# Patient Record
Sex: MALE | Race: BLACK OR AFRICAN AMERICAN | Employment: OTHER | URBAN - METROPOLITAN AREA
[De-identification: names, ages, dates, MRNs, and addresses within clinical notes are randomized per-mention and may not be internally consistent; named-entity substitution may affect disease eponyms.]

---

## 2017-01-24 ENCOUNTER — OFFICE VISIT (OUTPATIENT)
Dept: CARDIOLOGY CLINIC | Age: 72
End: 2017-01-24

## 2017-01-24 VITALS
DIASTOLIC BLOOD PRESSURE: 74 MMHG | WEIGHT: 190 LBS | HEART RATE: 61 BPM | HEIGHT: 74 IN | BODY MASS INDEX: 24.38 KG/M2 | SYSTOLIC BLOOD PRESSURE: 115 MMHG

## 2017-01-24 DIAGNOSIS — E78.5 DYSLIPIDEMIA: ICD-10-CM

## 2017-01-24 DIAGNOSIS — I10 BENIGN ESSENTIAL HTN: ICD-10-CM

## 2017-01-24 DIAGNOSIS — R94.39 ABNORMAL NUCLEAR STRESS TEST: ICD-10-CM

## 2017-01-24 DIAGNOSIS — I50.32 CHRONIC DIASTOLIC HEART FAILURE (HCC): Primary | ICD-10-CM

## 2017-01-24 DIAGNOSIS — I25.118 CORONARY ARTERY DISEASE OF NATIVE ARTERY OF NATIVE HEART WITH STABLE ANGINA PECTORIS (HCC): ICD-10-CM

## 2017-01-24 DIAGNOSIS — I73.9 PAD (PERIPHERAL ARTERY DISEASE) (HCC): ICD-10-CM

## 2017-01-24 DIAGNOSIS — I73.9 CLAUDICATION (HCC): ICD-10-CM

## 2017-01-24 RX ORDER — SIMVASTATIN 20 MG/1
10 TABLET, FILM COATED ORAL
Qty: 30 TAB | Refills: 4 | Status: SHIPPED | OUTPATIENT
Start: 2017-01-24 | End: 2017-06-30 | Stop reason: SDUPTHER

## 2017-01-24 RX ORDER — OMEPRAZOLE 20 MG/1
20 CAPSULE, DELAYED RELEASE ORAL DAILY
Qty: 30 CAP | Refills: 4 | Status: SHIPPED | OUTPATIENT
Start: 2017-01-24 | End: 2019-06-04

## 2017-01-24 NOTE — PROGRESS NOTES
1. Have you been to the ER, urgent care clinic since your last visit? Hospitalized since your last visit? No    2. Have you seen or consulted any other health care providers outside of the [address] [place] [address] since your last visit? Include any pap smears or colon screening. No     3. Since your last visit, have you had any of the following symptoms? Dizziness      4. Have you had any blood work, X-rays or cardiac testing? None    5. Where do you normally have your labs drawn? [facility] [place]    6. Do you need any refills today?   No    Medications confirmed by patient's medication bottles

## 2017-01-24 NOTE — MR AVS SNAPSHOT
Visit Information Date & Time Provider Department Dept. Phone Encounter #  
 1/24/2017  2:15 PM Sherrine Castleman, MD [facility] [address] [place] [address] [id number] Follow-up Instructions Return in about 2 months (around 3/24/2017). Follow-up and Disposition History Your Appointments 1/24/2017  2:15 PM  
ESTABLISHED PATIENT with Sherrine Castleman, MD  
[facility]) Appt Note: 5 m/  
 [address], [address] [facility] [address]  
[address], [address] [address]  
  
    
 3/21/2017 12:45 PM  
ESTABLISHED PATIENT with Sherrine Castleman, MD  
[facility]) Appt Note: 2 months [address], [address] [facility] [address]  
[phone] Upcoming Health Maintenance Date Due DTaP/Tdap/Td series (1 - Tdap) 5/25/1965 FOBT Q 1 YEAR AGE 50-75 5/25/1994 ZOSTER VACCINE AGE 60> 5/25/2004 GLAUCOMA SCREENING Q2Y 5/25/2009 Pneumococcal 65+ Low/Medium Risk (1 of 2 - PCV13) 5/25/2009 MEDICARE YEARLY EXAM 5/25/2009 INFLUENZA AGE 9 TO ADULT 8/1/2016 Allergies as of 1/24/2017  Review Complete On: 1/24/2017 By: Sherrine Castleman, MD  
 No Known Allergies Current Immunizations  Never Reviewed No immunizations on file. Not reviewed this visit You Were Diagnosed With   
  
 Codes Comments Chronic diastolic heart failure (HCC)    -  Primary ICD-10-CM: I50.32 
ICD-9-CM: 428.32 Benign essential HTN     ICD-10-CM: I10 
ICD-9-CM: 401.1 Coronary artery disease of native artery of native heart with stable angina pectoris ([facility] [address]     ICD-10-CM: I25.118 
ICD-9-CM: 414.01, 413.9 Abnormal nuclear stress test     ICD-10-CM: R94.39 
ICD-9-CM: 794.39 Claudication [facility])     ICD-10-CM: I73.9 ICD-9-CM: 443.9 PAD (peripheral artery disease) (HCC)     ICD-10-CM: I73.9 ICD-9-CM: 443.9 Dyslipidemia     ICD-10-CM: E78.5 ICD-9-CM: 272.4 Vitals BP Pulse Height(growth percentile) Weight(growth percentile) BMI Smoking Status 115/74 61 6' 2\" (1.88 m) 190 lb (86.2 kg) 24.39 kg/m2 Never Smoker Vitals History BMI and BSA Data Body Mass Index Body Surface Area  
 24.39 kg/m 2 2.12 m 2 Preferred Pharmacy Pharmacy Name Phone [facility], [address] Your Updated Medication List  
  
   
This list is accurate as of: 1/24/17  1:49 PM.  Always use your most recent med list.  
  
  
  
  
 ARGININE (L-ARGININE) PO Take  by mouth. aspirin 81 mg chewable tablet Take 325 mg by mouth daily. carvedilol 3.125 mg tablet Commonly known as:  Dareen Kaska Take 1 Tab by mouth two (2) times daily (with meals). cilostazol 50 mg tablet Commonly known as:  PLETAL Take 1 Tab by mouth Before breakfast and dinner. citalopram 20 mg tablet Commonly known as:  Dianelys Ivory Take 20 mg by mouth daily. Comp. Stocking,Thigh,Reg,Medium Misc  
1 Package by Does Not Apply route daily. furosemide 40 mg tablet Commonly known as:  LASIX Take 1 Tab by mouth two (2) times a day. lisinopril 2.5 mg tablet Commonly known as:  Betina Primmer Take 1 Tab by mouth daily. meloxicam 15 mg tablet Commonly known as:  MOBIC Take 15 mg by mouth daily. omeprazole 20 mg capsule Commonly known as:  PRILOSEC Take 1 Cap by mouth daily. oxyCODONE-acetaminophen 5-325 mg per tablet Commonly known as:  PERCOCET Take 1 Tab by mouth every four (4) hours as needed for Pain. Max Daily Amount: 6 Tabs. simvastatin 20 mg tablet Commonly known as:  ZOCOR Take 0.5 Tabs by mouth nightly. Prescriptions Sent to Pharmacy Refills  
 simvastatin (ZOCOR) 20 mg tablet 4 Sig: Take 0.5 Tabs by mouth nightly.   
 Class: Normal  
 Pharmacy: [facility], [address] [place] [address] Ph #: [phone] Route: Oral  
 omeprazole (PRILOSEC) 20 mg capsule 4 Sig: Take 1 Cap by mouth daily. Class: Normal  
 Pharmacy: [facility], [address] [place] [address] Ph #: [phone] Route: Oral  
  
Follow-up Instructions Return in about 2 months (around 3/24/2017). Introducing \[facility] [place] [facility]\"" & [facility] SERVICES! Zoey Mcclelland introduces HeTexted patient portal. Now you can access parts of your medical record, email your doctor's office, and request medication refills online. 1. In your internet browser, go to [email or web address] 2. Click on the First Time User? Click Here link in the Sign In box. You will see the New Member Sign Up page. 3. Enter your HeTexted Access Code exactly as it appears below. You will not need to use this code after youve completed the sign-up process. If you do not sign up before the expiration date, you must request a new code. · HeTexted Access Code: [email or web address] Expires: 4/24/2017  1:22 PM 
 
4. Enter the last four digits of your Social Security Number (xxxx) and Date of Birth (mm/dd/yyyy) as indicated and click Submit. You will be taken to the next sign-up page. 5. Create a HeTexted ID. This will be your HeTexted login ID and cannot be changed, so think of one that is secure and easy to remember. 6. Create a HeTexted password. You can change your password at any time. 7. Enter your Password Reset Question and Answer. This can be used at a later time if you forget your password. 8. Enter your e-mail address. You will receive e-mail notification when new information is available in [address] 9. Click Sign Up. You can now view and download portions of your medical record. 10. Click the Download Summary menu link to download a portable copy of your medical information. If you have questions, please visit the Frequently Asked Questions section of the [facility] website. Remember, Variable is NOT to be used for urgent needs. For medical emergencies, dial 911. Now available from your iPhone and Android! Please provide this summary of care documentation to your next provider. Your primary care clinician is listed as Diana Reasoner II. If you have any questions after today's visit, please call [phone].

## 2017-01-24 NOTE — LETTER
Veleria Lombard 1945 1/24/2017 Dear Bola Cintron MD 
 
I had the pleasure of evaluating  Mr. Corina Gomez in office today. Below are the relevant portions of my assessment and plan of care. ICD-10-CM ICD-9-CM 1. Chronic diastolic heart failure (HCC) I50.32 428.32   
2. Benign essential HTN I10 401.1 3. Coronary artery disease of native artery of native heart with stable angina pectoris ([facility] [address] I25.118 414.01   
  413.9 4. Abnormal nuclear stress test R94.39 794.39   
5. Claudication (HCC) I73.9 443.9 6. PAD (peripheral artery disease) ([facility]) I73.9 443.9 7. Dyslipidemia E78.5 272.4 Current Outpatient Prescriptions Medication Sig Dispense Refill  ARGININE, L-ARGININE, PO Take  by mouth.  simvastatin (ZOCOR) 20 mg tablet Take 0.5 Tabs by mouth nightly. 30 Tab 4  
 omeprazole (PRILOSEC) 20 mg capsule Take 1 Cap by mouth daily. 30 Cap 4  
 meloxicam (MOBIC) 15 mg tablet Take 15 mg by mouth daily.  carvedilol (COREG) 3.125 mg tablet Take 1 Tab by mouth two (2) times daily (with meals). 60 Tab 3  
 aspirin 81 mg chewable tablet Take 325 mg by mouth daily.  cilostazol (PLETAL) 50 mg tablet Take 1 Tab by mouth Before breakfast and dinner. 60 Tab 4  furosemide (LASIX) 40 mg tablet Take 1 Tab by mouth two (2) times a day. 60 Tab 4  
 lisinopril (PRINIVIL, ZESTRIL) 2.5 mg tablet Take 1 Tab by mouth daily. 90 Tab 3  
 citalopram (CELEXA) 20 mg tablet Take 20 mg by mouth daily.  oxyCODONE-acetaminophen (PERCOCET) 5-325 mg per tablet Take 1 Tab by mouth every four (4) hours as needed for Pain. Max Daily Amount: 6 Tabs. 20 Tab 0  
 Comp. Stocking,Thigh,Reg,Medium misc 1 Package by Does Not Apply route daily. 2 Package 0 Orders Placed This Encounter  ARGININE, L-ARGININE, PO Sig: Take  by mouth.  simvastatin (ZOCOR) 20 mg tablet Sig: Take 0.5 Tabs by mouth nightly. Dispense:  30 Tab Refill:  4  omeprazole (PRILOSEC) 20 mg capsule Sig: Take 1 Cap by mouth daily. Dispense:  30 Cap Refill:  4 If you have questions, please do not hesitate to call me. I look forward to following  Loida Osmani along with you.  
 
Sincerely, 
Minus MD Jhonny

## 2017-01-24 NOTE — PROGRESS NOTES
HISTORY OF PRESENT ILLNESS  Trinity Camilo is a 66 y.o. male. CHF   The history is provided by the patient. This is a chronic problem. The current episode started more than 1 week ago. The problem occurs daily. The problem has been gradually improving. Associated symptoms include shortness of breath. Pertinent negatives include no chest pain. Shortness of Breath   The history is provided by the patient. This is a chronic problem. The problem occurs intermittently. The current episode started more than 1 week ago. The problem has been gradually improving. Associated symptoms include claudication. Pertinent negatives include no PND, no orthopnea and no chest pain. Associated medical issues include CAD and heart failure. Chest Pain (Angina)    The history is provided by the patient. This is a chronic problem. The current episode started more than 1 week ago. The problem has not changed since onset. The problem occurs every several days. The pain is associated with exertion and normal activity. The pain is present in the left side. The pain is at a severity of 3/10. The quality of the pain is described as pressure-like. The pain does not radiate. Associated symptoms include claudication, lower extremity edema and shortness of breath. Pertinent negatives include no diaphoresis, no nausea, no orthopnea, no palpitations, no PND and no weakness. He has tried rest for the symptoms. Risk factors include smoking/tobacco exposure, hypertension and male gender. His past medical history is significant for HTN and CHF. Procedural history includes echocardiogram and stress thallium. Claudication   The history is provided by the patient. This is a chronic problem. The current episode started more than 1 week ago. The problem occurs daily. The problem has been gradually improving. Associated symptoms include shortness of breath. Pertinent negatives include no chest pain. The symptoms are aggravated by exertion and walking.  The symptoms are relieved by rest. He has tried rest for the symptoms. Review of Systems   Constitutional: Negative for chills, diaphoresis and weight loss. HENT: Negative for hearing loss. Eyes: Negative for blurred vision. Respiratory: Positive for shortness of breath. Negative for stridor. Cardiovascular: Positive for claudication. Negative for chest pain, palpitations, orthopnea and PND. Gastrointestinal: Negative for heartburn and nausea. Musculoskeletal: Negative for myalgias. Neurological: Negative for tingling, tremors, focal weakness, loss of consciousness and weakness. Psychiatric/Behavioral: Negative for depression and suicidal ideas. Family History   Problem Relation Age of Onset    Heart Attack Neg Hx     Stroke Neg Hx        Past Medical History   Diagnosis Date    Benign essential HTN 2/17/2016    Dyslipidemia 1/24/2017       Past Surgical History   Procedure Laterality Date    Hx cholecystectomy      Hx gi      Hx hernia repair Bilateral        Social History   Substance Use Topics    Smoking status: Never Smoker    Smokeless tobacco: Never Used    Alcohol use No       No Known Allergies    Outpatient Prescriptions Marked as Taking for the 1/24/17 encounter (Office Visit) with An Mcgill MD   Medication Sig Dispense Refill    ARGININE, L-ARGININE, PO Take  by mouth.  simvastatin (ZOCOR) 20 mg tablet Take 0.5 Tabs by mouth nightly. 30 Tab 4    omeprazole (PRILOSEC) 20 mg capsule Take 1 Cap by mouth daily. 30 Cap 4    meloxicam (MOBIC) 15 mg tablet Take 15 mg by mouth daily.  carvedilol (COREG) 3.125 mg tablet Take 1 Tab by mouth two (2) times daily (with meals). 60 Tab 3    aspirin 81 mg chewable tablet Take 325 mg by mouth daily.  cilostazol (PLETAL) 50 mg tablet Take 1 Tab by mouth Before breakfast and dinner. 60 Tab 4    furosemide (LASIX) 40 mg tablet Take 1 Tab by mouth two (2) times a day.  60 Tab 4        Visit Vitals    /74  Pulse 61    Ht 6' 2\" (1.88 m)    Wt 86.2 kg (190 lb)    BMI 24.39 kg/m2     Physical Exam   Constitutional: He is oriented to person, place, and time. He appears well-developed and well-nourished. No distress. HENT:   Head: Atraumatic. Mouth/Throat: No oropharyngeal exudate. Eyes: Conjunctivae are normal. No scleral icterus. Neck: Neck supple. No JVD present. Cardiovascular: Normal rate and regular rhythm. Exam reveals no gallop. No murmur heard. Pulmonary/Chest: Effort normal and breath sounds normal. No stridor. He has no wheezes. He has no rales. Abdominal: Soft. There is no tenderness. There is no rebound. Musculoskeletal: Normal range of motion. He exhibits edema (improving since last visit). Neurological: He is alert and oriented to person, place, and time. He exhibits normal muscle tone. Skin: Skin is warm. He is not diaphoretic. Psychiatric: He has a normal mood and affect. His behavior is normal.     6/16 Cardiac Cath  FINDINGS:  1. Left main is patent and bifurcates into left anterior descending artery  and circumflex artery. 2. Left anterior descending artery has mild ectasia with moderate to severe  stenosis in the proximal portion. It appears to be a napkin ring type  lesion. By IVUS imaging, we obtained a stenosis of 62% with an area of 3.7  mm2. Mid to distal left anterior descending artery had wall irregularities  with sluggish flow, suggestive of severe microvascular disease. Apical LAD  had wall irregularities. 3. Diagonal 1 and diagonal 2 artery appeared to be small caliber vessel  with wall irregularities. 4. Circumflex artery is codominant with sluggish flow consistent with  microvascular disease. 5. Right coronary artery is codominant with sluggish flow suggestive of  microvascular disease.   6. Left ventricular end-diastolic pressure was 13 mmHg.     CONCLUSION: Single-vessel coronary artery disease.  Left anterior  descending artery stenosis was 62% in the proximal portion by intravascular  ultrasound imaging. Normal left ventricular and diastolic pressure. The  patient is to be on intense medical management and risk factor  modification. ASSESSMENT and PLAN    ICD-10-CM ICD-9-CM    1. Chronic diastolic heart failure (HCC) I50.32 428.32    2. Benign essential HTN I10 401.1    3. Coronary artery disease of native artery of native heart with stable [facility]ina pectoris [facility] I25.118 414.01      413.9    4. Abnormal nuclear stress test R94.39 794.39    5. Claudication ([facility]) I73.9 443.9    6. PAD (peripheral artery disease) ([facility]) I73.9 443.9    7. Dyslipidemia E78.5 272.4      Orders Placed This Encounter    simvastatin (ZOCOR) 20 mg tablet     Sig: Take 0.5 Tabs by mouth nightly. Dispense:  30 Tab     Refill:  4    omeprazole (PRILOSEC) 20 mg capsule     Sig: Take 1 Cap by mouth daily. Dispense:  30 Cap     Refill:  4     Follow-up Disposition:  Return in about 2 months (around 3/24/2017). Patient with LV dysfunction/ abnormal stress test-- had cardiac cath-62% LAD stenosis by IVUS imaging. Will add statin and PPI for GERD. Will reevaluate his symptoms in 2 months.

## 2017-02-23 ENCOUNTER — APPOINTMENT (OUTPATIENT)
Dept: GENERAL RADIOLOGY | Age: 72
End: 2017-02-23
Attending: EMERGENCY MEDICINE
Payer: MEDICARE

## 2017-02-23 ENCOUNTER — HOSPITAL ENCOUNTER (EMERGENCY)
Age: 72
Discharge: HOME | End: 2017-02-23
Attending: EMERGENCY MEDICINE
Payer: MEDICARE

## 2017-02-23 VITALS
WEIGHT: 192 LBS | OXYGEN SATURATION: 95 % | RESPIRATION RATE: 14 BRPM | HEART RATE: 66 BPM | BODY MASS INDEX: 24.64 KG/M2 | SYSTOLIC BLOOD PRESSURE: 108 MMHG | DIASTOLIC BLOOD PRESSURE: 81 MMHG | HEIGHT: 74 IN | TEMPERATURE: 98.2 F

## 2017-02-23 DIAGNOSIS — I20.9 ANGINA PECTORIS (HCC): Primary | ICD-10-CM

## 2017-02-23 LAB
ALBUMIN SERPL BCP-MCNC: 3.7 G/DL (ref 3.4–5)
ALBUMIN/GLOB SERPL: 1 {RATIO} (ref 0.8–1.7)
ALP SERPL-CCNC: 72 U/L (ref 45–117)
ALT SERPL-CCNC: 23 U/L (ref 16–61)
ANION GAP BLD CALC-SCNC: 4 MMOL/L (ref 3–18)
AST SERPL W P-5'-P-CCNC: 18 U/L (ref 15–37)
BASOPHILS # BLD AUTO: 0 K/UL (ref 0–0.06)
BASOPHILS # BLD: 0 % (ref 0–2)
BILIRUB SERPL-MCNC: 0.7 MG/DL (ref 0.2–1)
BNP SERPL-MCNC: 188 PG/ML (ref 0–900)
BUN SERPL-MCNC: 17 MG/DL (ref 7–18)
BUN/CREAT SERPL: 15 (ref 12–20)
CALCIUM SERPL-MCNC: 9.3 MG/DL (ref 8.5–10.1)
CHLORIDE SERPL-SCNC: 105 MMOL/L (ref 100–108)
CK MB CFR SERPL CALC: 1.8 % (ref 0–4)
CK MB CFR SERPL CALC: 1.9 % (ref 0–4)
CK MB SERPL-MCNC: 3.3 NG/ML (ref 5–25)
CK MB SERPL-MCNC: 3.7 NG/ML (ref 5–25)
CK SERPL-CCNC: 181 U/L (ref 39–308)
CK SERPL-CCNC: 190 U/L (ref 39–308)
CO2 SERPL-SCNC: 30 MMOL/L (ref 21–32)
CREAT SERPL-MCNC: 1.17 MG/DL (ref 0.6–1.3)
DIFFERENTIAL METHOD BLD: NORMAL
EOSINOPHIL # BLD: 0.2 K/UL (ref 0–0.4)
EOSINOPHIL NFR BLD: 5 % (ref 0–5)
ERYTHROCYTE [DISTWIDTH] IN BLOOD BY AUTOMATED COUNT: 13.7 % (ref 11.6–14.5)
GLOBULIN SER CALC-MCNC: 3.8 G/DL (ref 2–4)
GLUCOSE SERPL-MCNC: 98 MG/DL (ref 74–99)
HCT VFR BLD AUTO: 44.8 % (ref 36–48)
HGB BLD-MCNC: 15.3 G/DL (ref 13–16)
LYMPHOCYTES # BLD AUTO: 47 % (ref 21–52)
LYMPHOCYTES # BLD: 2.2 K/UL (ref 0.9–3.6)
MCH RBC QN AUTO: 29.5 PG (ref 24–34)
MCHC RBC AUTO-ENTMCNC: 34.2 G/DL (ref 31–37)
MCV RBC AUTO: 86.5 FL (ref 74–97)
MONOCYTES # BLD: 0.3 K/UL (ref 0.05–1.2)
MONOCYTES NFR BLD AUTO: 7 % (ref 3–10)
NEUTS SEG # BLD: 1.9 K/UL (ref 1.8–8)
NEUTS SEG NFR BLD AUTO: 41 % (ref 40–73)
PLATELET # BLD AUTO: 241 K/UL (ref 135–420)
PMV BLD AUTO: 9.4 FL (ref 9.2–11.8)
POTASSIUM SERPL-SCNC: 4.3 MMOL/L (ref 3.5–5.5)
PROT SERPL-MCNC: 7.5 G/DL (ref 6.4–8.2)
RBC # BLD AUTO: 5.18 M/UL (ref 4.7–5.5)
SODIUM SERPL-SCNC: 139 MMOL/L (ref 136–145)
TROPONIN I SERPL-MCNC: <0.02 NG/ML (ref 0–0.04)
TROPONIN I SERPL-MCNC: <0.02 NG/ML (ref 0–0.04)
WBC # BLD AUTO: 4.7 K/UL (ref 4.6–13.2)

## 2017-02-23 PROCEDURE — 71020 XR CHEST PA LAT: CPT

## 2017-02-23 PROCEDURE — 94762 N-INVAS EAR/PLS OXIMTRY CONT: CPT

## 2017-02-23 PROCEDURE — 93005 ELECTROCARDIOGRAM TRACING: CPT

## 2017-02-23 PROCEDURE — 99285 EMERGENCY DEPT VISIT HI MDM: CPT

## 2017-02-23 PROCEDURE — 80053 COMPREHEN METABOLIC PANEL: CPT | Performed by: EMERGENCY MEDICINE

## 2017-02-23 PROCEDURE — 85025 COMPLETE CBC W/AUTO DIFF WBC: CPT | Performed by: EMERGENCY MEDICINE

## 2017-02-23 PROCEDURE — 82550 ASSAY OF CK (CPK): CPT | Performed by: EMERGENCY MEDICINE

## 2017-02-23 PROCEDURE — 83880 ASSAY OF NATRIURETIC PEPTIDE: CPT | Performed by: EMERGENCY MEDICINE

## 2017-02-23 RX ORDER — SODIUM CHLORIDE 0.9 % (FLUSH) 0.9 %
5-10 SYRINGE (ML) INJECTION EVERY 8 HOURS
Status: DISCONTINUED | OUTPATIENT
Start: 2017-02-23 | End: 2017-02-23 | Stop reason: HOSPADM

## 2017-02-23 RX ORDER — SODIUM CHLORIDE 0.9 % (FLUSH) 0.9 %
5-10 SYRINGE (ML) INJECTION AS NEEDED
Status: DISCONTINUED | OUTPATIENT
Start: 2017-02-23 | End: 2017-02-23 | Stop reason: HOSPADM

## 2017-02-23 NOTE — ED NOTES
Patient resting comfortably, call bell within reach, bed at lowest level, no other needs at this time.

## 2017-02-23 NOTE — DISCHARGE INSTRUCTIONS
Angina: Care Instructions  Your Care Instructions    You have a problem called angina. Angina happens when there is not enough blood flow to your heart muscle. Angina is a sign of coronary artery disease (CAD). CAD occurs when blood vessels that supply the heart become narrowed. Having CAD increases your risk of a heart attack. Chest pain or pressure is the most common symptom of angina. But some people have other symptoms, like:  · Pain, pressure, or a strange feeling in the back, neck, jaw, or upper belly, or in one or both shoulders or arms. · Shortness of breath. · Nausea or vomiting. · Lightheadedness or sudden weakness. · Fast or irregular heartbeat. Women are somewhat more likely than men to have angina symptoms like shortness of breath, nausea, and back or jaw pain. Angina can be dangerous. That's why it is important to pay attention to your symptoms. Know what is typical for you, learn how to control your symptoms, and understand when you need to get treatment. A change in your usual pattern of symptoms is an emergency. It may mean that you are having a heart attack. The doctor has checked you carefully, but problems can develop later. If you notice any problems or new symptoms, get medical treatment right away. Follow-up care is a key part of your treatment and safety. Be sure to make and go to all appointments, and call your doctor if you are having problems. It's also a good idea to know your test results and keep a list of the medicines you take. How can you care for yourself at home? Medicines  · If your doctor has given you nitroglycerin for angina symptoms, keep it with you at all times. If you have symptoms, sit down and rest, and take the first dose of nitroglycerin as directed. If your symptoms get worse or are not getting better within 5 minutes, call 911 right away. Stay on the phone. The emergency  will give you further instructions.   · If your doctor advises it, take 1 low-dose aspirin a day to prevent heart attack. · Be safe with medicines. Take your medicines exactly as prescribed. Call your doctor if you think you are having a problem with your medicine. You will get more details on the specific medicines your doctor prescribes. Lifestyle changes  · Do not smoke. If you need help quitting, talk to your doctor about stop-smoking programs and medicines. These can increase your chances of quitting for good. · Eat a heart-healthy diet that is low in saturated fat and salt, and is high in fiber. Talk to your doctor or a dietitian about healthy eating. · Stay at a healthy weight. Or lose weight if you need to. Activity  · Talk to your doctor about a level of activity that is safe for you. · If an activity causes angina symptoms, stop and rest.  When should you call for help? Call 911 anytime you think you may need emergency care. For example, call if:  · You passed out (lost consciousness). · You have symptoms of a heart attack. These may include:  ¨ Chest pain or pressure, or a strange feeling in the chest.  ¨ Sweating. ¨ Shortness of breath. ¨ Nausea or vomiting. ¨ Pain, pressure, or a strange feeling in the back, neck, jaw, or upper belly or in one or both shoulders or arms. ¨ Lightheadedness or sudden weakness. ¨ A fast or irregular heartbeat. After you call 911, the  may tell you to chew 1 adult-strength or 2 to 4 low-dose aspirin. Wait for an ambulance. Do not try to drive yourself. · You have angina symptoms that do not go away with rest or are not getting better within 5 minutes after you take a dose of nitroglycerin. Call your doctor now or seek immediate medical care if:  · You are having angina symptoms more often than usual, or they are different or worse than usual.  · You feel dizzy or lightheaded, or you feel like you may faint. Watch closely for changes in your health, and be sure to contact your doctor if you have any problems.   Where can you learn more? Go to http://hugo-sherley.info/. Enter H129 in the search box to learn more about \"Angina: Care Instructions. \"  Current as of: January 27, 2016  Content Version: 11.1  © 7962-0444 [employer]. Care instructions adapted under license by [employer] (which disclaims liability or warranty for this information). If you have questions about a medical condition or this instruction, always ask your healthcare professional. [address] any warranty or liability for your use of this information.

## 2017-02-23 NOTE — ED NOTES
Pt hourly rounding competed. Safety   Pt (*) resting on stretcher with side rails up and call bell in reach. () in chair    () in parents arms. Toileting   Pt offered ()Bedpan     ()Assistance to Restroom     ()Urinal  Ongoing Updates  Updated on plan of care and status of test results.   Pain Management  Inquired as to comfort and offered comfort measures:    () warm blankets   () dimmed lights

## 2017-02-23 NOTE — ED NOTES
I have reviewed discharge instructions with the patient. The patient verbalized understanding. All discharge education and paperwork given, no questions at this time.

## 2017-02-23 NOTE — ED PROVIDER NOTES
HPI Comments: 8:10 AM Jena Osman is a 66 y.o.  [place] male with h/o HTN who presents to ED complaining of chest pain onset a couple of months ago. He states that his chest pain worsened yesterday afternoon at 1500, and has been intermittent with the last episode occurring around midnight. Each episode lasted about 10 minutes and during the episodes he also experienced nausea, SOB, and dizziness. The patient lives by himself, is not , and has no children. He also complains of trouble swallowing, vomiting, diarrhea, dysuria, urinary frequency, back pain, fever, chills, and a non-productive cough. He reports that the trouble swallowing is being treated by his PCP. He last had diarrhea and vomiting two days ago. No other concerns or symptoms at this time. He denies tobacco use, alcohol use, and illicit drug use. PCP: Narciso Reyez MD      The history is provided by the patient. Past Medical History:   Diagnosis Date    Benign essential HTN 2/17/2016    Dyslipidemia 1/24/2017       Past Surgical History:   Procedure Laterality Date    HX CHOLECYSTECTOMY      HX GI      HX HERNIA REPAIR Bilateral          Family History:   Problem Relation Age of Onset    Heart Attack Neg Hx     Stroke Neg Hx        Social History     Social History    Marital status: SINGLE     Spouse name: N/A    Number of children: N/A    Years of education: N/A     Occupational History    Not on file. Social History Main Topics    Smoking status: Never Smoker    Smokeless tobacco: Never Used    Alcohol use No    Drug use: No    Sexual activity: Not on file     Other Topics Concern    Not on file     Social History Narrative         ALLERGIES: Review of patient's allergies indicates no known allergies. Review of Systems   Constitutional: Positive for chills and fever. Negative for appetite change, diaphoresis, fatigue and unexpected weight change. HENT: Positive for trouble swallowing. Negative for congestion, dental problem, ear discharge, ear pain, hearing loss, nosebleeds, rhinorrhea, sinus pressure, sore throat, tinnitus and voice change. Eyes: Negative for photophobia, pain, discharge, redness and visual disturbance. Respiratory: Positive for cough and shortness of breath. Negative for choking, chest tightness, wheezing and stridor. Cardiovascular: Positive for chest pain. Negative for palpitations and leg swelling. Gastrointestinal: Positive for diarrhea, nausea and vomiting. Negative for abdominal distention, abdominal pain, anal bleeding, blood in stool and constipation. Genitourinary: Positive for dysuria and frequency. Negative for decreased urine volume, difficulty urinating, discharge, flank pain, genital sores, hematuria, penile pain, penile swelling, scrotal swelling, testicular pain and urgency. Musculoskeletal: Positive for back pain. Negative for neck pain and neck stiffness. Skin: Negative for pallor, rash and wound. Neurological: Positive for dizziness. Negative for tremors, seizures, syncope, speech difficulty, weakness, light-headedness and headaches. Hematological: Negative for adenopathy. Does not bruise/bleed easily. Psychiatric/Behavioral: Negative for agitation, behavioral problems, confusion, hallucinations, self-injury, sleep disturbance and suicidal ideas. The patient is not nervous/anxious and is not hyperactive. Vitals:    02/23/17 1100 02/23/17 1130 02/23/17 1200 02/23/17 1230   BP: 130/78 112/68 111/67 108/81   Pulse: 62 79 66    Resp:       Temp:       SpO2: 94% 95%  95%   Weight:       Height:                Physical Exam   Constitutional: He is oriented to person, place, and time. He appears well-developed and well-nourished. No distress. HENT:   Head: Normocephalic and atraumatic.    Right Ear: External ear normal.   Left Ear: External ear normal.   Nose: Nose normal.   Mouth/Throat: Oropharynx is clear and moist. No oropharyngeal exudate. Eyes: Conjunctivae and EOM are normal. Pupils are equal, round, and reactive to light. Right eye exhibits no discharge. Left eye exhibits no discharge. No scleral icterus. Neck: Normal range of motion. Neck supple. No JVD present. No tracheal deviation present. No thyromegaly present. Cardiovascular: Normal rate, regular rhythm, normal heart sounds and intact distal pulses. Exam reveals no gallop and no friction rub. No murmur heard. Pulmonary/Chest: Effort normal and breath sounds normal. No stridor. No respiratory distress. He has no wheezes. He has no rales. He exhibits no tenderness. Abdominal: Soft. Bowel sounds are normal. He exhibits no distension and no mass. There is no tenderness. There is no rebound and no guarding. Musculoskeletal: Normal range of motion. He exhibits no edema, tenderness or deformity. Lymphadenopathy:     He has no cervical adenopathy. Neurological: He is alert and oriented to person, place, and time. No cranial nerve deficit. He exhibits normal muscle tone. Coordination normal.   Skin: Skin is warm and dry. No rash noted. He is not diaphoretic. No erythema. No pallor. Psychiatric: He has a normal mood and affect. His behavior is normal. Judgment and thought content normal.   Nursing note and vitals reviewed. MDM  Number of Diagnoses or Management Options  Diagnosis management comments: Differential includes:  Angina, Chest wall pain, GI causes including GERD, Anxiety, (Pneumonia, PE and ACS are unlikely). Patient had a Cardiac catheterization on 7/8/16 which revealed single vessel coronary disease. Left anterior descending artery stenosis was 62% in the proximal portion by intravascular ultrasound imaging. Normal left ventricular and diastolic pressure. Intense medical management and risk factor modification.        Amount and/or Complexity of Data Reviewed  Clinical lab tests: ordered and reviewed  Tests in the radiology section of CPT®: ordered and reviewed  Tests in the medicine section of CPT®: ordered and reviewed  Decide to obtain previous medical records or to obtain history from someone other than the patient: yes  Obtain history from someone other than the patient: yes  Review and summarize past medical records: yes  Independent visualization of images, tracings, or specimens: yes    Risk of Complications, Morbidity, and/or Mortality  Presenting problems: high  Diagnostic procedures: high  Management options: high    Patient Progress  Patient progress: stable    ED Course       Procedures      Vitals:  Patient Vitals for the past 12 hrs:   Temp Pulse Resp BP SpO2   02/23/17 1230 - - - 108/81 95 %   02/23/17 1200 - 66 - 111/67 -   02/23/17 1130 - 79 - 112/68 95 %   02/23/17 1100 - 62 - 130/78 94 %   02/23/17 1015 - 65 14 125/81 -   02/23/17 1000 - 72 10 135/80 -   02/23/17 0930 - 60 - 108/82 100 %   02/23/17 0830 - 61 15 123/74 100 %   02/23/17 0800 - - - - 99 %   02/23/17 0800 - 62 16 148/86 100 %   02/23/17 0738 - 67 14 - (!) 78 %   02/23/17 0737 - - 20 143/83 -   02/23/17 0731 98.2 °F (36.8 °C) 87 20 - 96 %   Pulse ox reviewed and WNL      Medications ordered:   Medications   sodium chloride (NS) flush 5-10 mL (not administered)   sodium chloride (NS) flush 5-10 mL (not administered)         Lab findings:  Recent Results (from the past 12 hour(s))   EKG, 12 LEAD, INITIAL    Collection Time: 02/23/17  7:35 AM   Result Value Ref Range    Ventricular Rate 64 BPM    Atrial Rate 52 BPM    P-R Interval 124 ms    QRS Duration 76 ms    Q-T Interval 436 ms    QTC Calculation (Bezet) 449 ms    Calculated P Axis 57 degrees    Calculated R Axis 30 degrees    Calculated T Axis -34 degrees    Diagnosis       Sinus bradycardia with frequent premature ventricular complexes  Possible Left atrial enlargement  Nonspecific T wave abnormality  Abnormal ECG  When compared with ECG of 25-MAR-2016 20:44,  No significant change was found     CARDIAC PANEL,(CK, CKMB & TROPONIN)    Collection Time: 02/23/17  7:47 AM   Result Value Ref Range     39 - 308 U/L    CK - MB 3.7 (H) <3.6 ng/ml    CK-MB Index 1.9 0.0 - 4.0 %    Troponin-I, Qt. <0.02 0.0 - 0.045 NG/ML   CBC WITH AUTOMATED DIFF    Collection Time: 02/23/17  7:47 AM   Result Value Ref Range    WBC 4.7 4.6 - 13.2 K/uL    RBC 5.18 4.70 - 5.50 M/uL    HGB 15.3 13.0 - 16.0 g/dL    HCT 44.8 36.0 - 48.0 %    MCV 86.5 74.0 - 97.0 FL    MCH 29.5 24.0 - 34.0 PG    MCHC 34.2 31.0 - 37.0 g/dL    RDW 13.7 11.6 - 14.5 %    PLATELET 922 206 - 710 K/uL    MPV 9.4 9.2 - 11.8 FL    NEUTROPHILS 41 40 - 73 %    LYMPHOCYTES 47 21 - 52 %    MONOCYTES 7 3 - 10 %    EOSINOPHILS 5 0 - 5 %    BASOPHILS 0 0 - 2 %    ABS. NEUTROPHILS 1.9 1.8 - 8.0 K/UL    ABS. LYMPHOCYTES 2.2 0.9 - 3.6 K/UL    ABS. MONOCYTES 0.3 0.05 - 1.2 K/UL    ABS. EOSINOPHILS 0.2 0.0 - 0.4 K/UL    ABS. BASOPHILS 0.0 0.0 - 0.06 K/UL    DF AUTOMATED     METABOLIC PANEL, COMPREHENSIVE    Collection Time: 02/23/17  7:47 AM   Result Value Ref Range    Sodium 139 136 - 145 mmol/L    Potassium 4.3 3.5 - 5.5 mmol/L    Chloride 105 100 - 108 mmol/L    CO2 30 21 - 32 mmol/L    Anion gap 4 3.0 - 18 mmol/L    Glucose 98 74 - 99 mg/dL    BUN 17 7.0 - 18 MG/DL    Creatinine 1.17 0.6 - 1.3 MG/DL    BUN/Creatinine ratio 15 12 - 20      GFR est AA >60 >60 ml/min/1.73m2    GFR est non-AA >60 >60 ml/min/1.73m2    Calcium 9.3 8.5 - 10.1 MG/DL    Bilirubin, total 0.7 0.2 - 1.0 MG/DL    ALT (SGPT) 23 16 - 61 U/L    AST (SGOT) 18 15 - 37 U/L    Alk.  phosphatase 72 45 - 117 U/L    Protein, total 7.5 6.4 - 8.2 g/dL    Albumin 3.7 3.4 - 5.0 g/dL    Globulin 3.8 2.0 - 4.0 g/dL    A-G Ratio 1.0 0.8 - 1.7     PRO-BNP    Collection Time: 02/23/17  7:47 AM   Result Value Ref Range    NT pro- 0 - 900 PG/ML   CARDIAC PANEL,(CK, CKMB & TROPONIN)    Collection Time: 02/23/17 11:08 AM   Result Value Ref Range     39 - 308 U/L    CK - MB 3.3 <3.6 ng/ml    CK-MB Index 1.8 0.0 - 4.0 %    Troponin-I, Qt. <0.02 0.0 - 0.045 NG/ML       EKG interpretation by ED Physician:   ED EKG interpretation:  Rhythm: sinus bradycardia with frequent PVC's. Rate (approx.): 64; Axis: normal; QRS interval: normal ; ST/T wave: non-specific ST/T wave changes unchanged from 3/25/16; Other findings: normal. This EKG was interpreted by [facility], [facility] Provider. X-Ray, CT or other radiology findings or impressions:  XR CHEST PA LAT   Final Result   IMPRESSION:     1. Mild prominence of the cardiac silhouette. 2. No acute findings otherwise. Interpreted by radiologist.       Orders:  Orders Placed This Encounter    XR CHEST PA LAT     Standing Status:   Standing     Number of Occurrences:   1     Order Specific Question:   Transport     Answer:   Wheelchair [7]     Order Specific Question:   Reason for Exam     Answer:   cp    CARDIAC PANEL,(CK, CKMB & TROPONIN)     Standing Status:   Standing     Number of Occurrences:   1    CBC WITH AUTOMATED DIFF     Standing Status:   Standing     Number of Occurrences:   1    METABOLIC PANEL, COMPREHENSIVE     Standing Status:   Standing     Number of Occurrences:   1    PRO-BNP     Standing Status:   Standing     Number of Occurrences:   1    CARDIAC PANEL,(CK, CKMB & TROPONIN)     Standing Status:   Standing     Number of Occurrences:   1    CARDIAC MONITORING     Standing Status:   Standing     Number of Occurrences:   1     Order Specific Question:   Type: Answer:   Bedside     Order Specific Question:   Off unit:      Answer:   w/out Tele or Nurse    PULSE OXIMETRY CONTINUOUS     Standing Status:   Standing     Number of Occurrences:   1    EKG, 12 LEAD, INITIAL     Standing Status:   Standing     Number of Occurrences:   1     Order Specific Question:   Reason for Exam:     Answer:   cp    SALINE LOCK IV ONE TIME Routine     Standing Status:   Standing     Number of Occurrences:   1    BLOOD PRESSURE MONITOR     Standing Status:   Standing     Number of Occurrences:   1    sodium chloride (NS) flush 5-10 mL    sodium chloride (NS) flush 5-10 mL       Reevaluation, Progress notes, Consult notes, or additional Procedure notes:   1:04 PM I have reassessed the patient and discussed their results and diagnosis. Pt will be discharged in stable condition. Patient is to return to emergency department if any new or worsening condition. Patient understands and verbalizes agreement with plan. Disposition:  Diagnosis:   1. Angina pectoris ([address]        Disposition: Discharged    Follow-up Information     Follow up With Details Comments [facility] MD LUZ MARIA Schedule an appointment as soon as possible for a visit in 1 day Return to the ED, If symptoms worsen [address] [place] [address]  [phone]             Patient's Medications   Start Taking    No medications on file   Continue Taking    ARGININE, L-ARGININE, PO    Take  by mouth. ASPIRIN 81 MG CHEWABLE TABLET    Take 325 mg by mouth daily. CARVEDILOL (COREG) 3.125 MG TABLET    Take 1 Tab by mouth two (2) times daily (with meals). CILOSTAZOL (PLETAL) 50 MG TABLET    Take 1 Tab by mouth Before breakfast and dinner. CITALOPRAM (CELEXA) 20 MG TABLET    Take 20 mg by mouth daily. COMP. [id number] [id number] Package by Does Not Apply route daily. FUROSEMIDE (LASIX) 40 MG TABLET    Take 1 Tab by mouth two (2) times a day. LISINOPRIL (PRINIVIL, ZESTRIL) 2.5 MG TABLET    Take 1 Tab by mouth daily. MELOXICAM (MOBIC) 15 MG TABLET    Take 15 mg by mouth daily. OMEPRAZOLE (PRILOSEC) 20 MG CAPSULE    Take 1 Cap by mouth daily. OXYCODONE-ACETAMINOPHEN (PERCOCET) 5-325 MG PER TABLET    Take 1 Tab by mouth every four (4) hours as needed for Pain. Max Daily Amount: 6 Tabs. SIMVASTATIN (ZOCOR) 20 MG TABLET    Take 0.5 Tabs by mouth nightly.    These Medications have changed    No medications on file   Stop Taking    No medications on file     Scribe [id number]  scribing for and in the presence of Karma uDnne DO (02/23/17)  Signed by: Farrukh Mcrae, February 23, 2017 at 1:01 PM     Physician Attestation  I personally performed the services described in this documentation, reviewed and edited the documentation which was dictated to the scribe in my presence, and it accurately records my words and actions.     Karma Dunne DO (02/23/17)

## 2017-02-24 LAB
ATRIAL RATE: 52 BPM
CALCULATED P AXIS, ECG09: 57 DEGREES
CALCULATED R AXIS, ECG10: 30 DEGREES
CALCULATED T AXIS, ECG11: -34 DEGREES
DIAGNOSIS, 93000: NORMAL
P-R INTERVAL, ECG05: 124 MS
Q-T INTERVAL, ECG07: 436 MS
QRS DURATION, ECG06: 76 MS
QTC CALCULATION (BEZET), ECG08: 449 MS
VENTRICULAR RATE, ECG03: 64 BPM

## 2017-03-21 ENCOUNTER — OFFICE VISIT (OUTPATIENT)
Dept: CARDIOLOGY CLINIC | Age: 72
End: 2017-03-21

## 2017-03-21 VITALS
SYSTOLIC BLOOD PRESSURE: 121 MMHG | BODY MASS INDEX: 24 KG/M2 | DIASTOLIC BLOOD PRESSURE: 78 MMHG | HEART RATE: 68 BPM | HEIGHT: 74 IN | WEIGHT: 187 LBS

## 2017-03-21 DIAGNOSIS — I10 BENIGN ESSENTIAL HTN: ICD-10-CM

## 2017-03-21 DIAGNOSIS — I51.9 LV DYSFUNCTION: ICD-10-CM

## 2017-03-21 DIAGNOSIS — E78.5 DYSLIPIDEMIA: ICD-10-CM

## 2017-03-21 DIAGNOSIS — I73.9 PAD (PERIPHERAL ARTERY DISEASE) (HCC): ICD-10-CM

## 2017-03-21 DIAGNOSIS — I50.32 CHRONIC DIASTOLIC HEART FAILURE (HCC): ICD-10-CM

## 2017-03-21 DIAGNOSIS — I25.118 CORONARY ARTERY DISEASE OF NATIVE ARTERY OF NATIVE HEART WITH STABLE ANGINA PECTORIS (HCC): Primary | ICD-10-CM

## 2017-03-21 RX ORDER — NITROGLYCERIN 0.4 MG/1
TABLET SUBLINGUAL
COMMUNITY
End: 2019-06-04

## 2017-03-21 RX ORDER — GABAPENTIN 100 MG/1
100 CAPSULE ORAL
COMMUNITY
End: 2019-06-04

## 2017-03-21 NOTE — PROGRESS NOTES
HISTORY OF PRESENT ILLNESS  Rebecca Loving is a 66 y.o. male. CHF   The history is provided by the patient. This is a chronic problem. The current episode started more than 1 week ago. The problem occurs daily. The problem has been gradually improving. Associated symptoms include shortness of breath. Pertinent negatives include no chest pain. Leg Swelling   The history is provided by the patient. This is a chronic problem. The problem occurs every several days. The problem has been gradually improving. Associated symptoms include shortness of breath. Pertinent negatives include no chest pain. Shortness of Breath   The history is provided by the patient. This is a chronic problem. The problem occurs intermittently. The current episode started more than 1 week ago. The problem has been gradually improving. Associated symptoms include claudication. Pertinent negatives include no PND, no orthopnea and no chest pain. Associated medical issues include CAD and heart failure. Chest Pain (Angina)    The history is provided by the patient. This is a chronic problem. The current episode started more than 1 week ago. The problem has not changed since onset. The problem occurs every several days. The pain is associated with exertion and normal activity. The pain is present in the left side. The pain is at a severity of 3/10. The quality of the pain is described as pressure-like. The pain does not radiate. Associated symptoms include claudication, lower extremity edema and shortness of breath. Pertinent negatives include no diaphoresis, no nausea, no orthopnea, no palpitations, no PND and no weakness. He has tried rest for the symptoms. Risk factors include smoking/tobacco exposure, hypertension and male gender. His past medical history is significant for HTN and CHF. Procedural history includes echocardiogram and stress thallium. Claudication   The history is provided by the patient. This is a chronic problem.  The current episode started more than 1 week ago. The problem occurs daily. The problem has been gradually improving. Associated symptoms include shortness of breath. Pertinent negatives include no chest pain. The symptoms are aggravated by exertion and walking. The symptoms are relieved by rest. He has tried rest for the symptoms. Review of Systems   Constitutional: Negative for chills, diaphoresis and weight loss. HENT: Negative for hearing loss. Eyes: Negative for blurred vision. Respiratory: Positive for shortness of breath. Negative for stridor. Cardiovascular: Positive for claudication. Negative for chest pain, palpitations, orthopnea and PND. Gastrointestinal: Negative for heartburn and nausea. Musculoskeletal: Negative for myalgias. Neurological: Negative for tingling, tremors, focal weakness, loss of consciousness and weakness. Psychiatric/Behavioral: Negative for depression and suicidal ideas. Family History   Problem Relation Age of Onset    Heart Attack Neg Hx     Stroke Neg Hx        Past Medical History:   Diagnosis Date    Benign essential HTN 2/17/2016    Dyslipidemia 1/24/2017       Past Surgical History:   Procedure Laterality Date    HX CHOLECYSTECTOMY      HX GI      HX HERNIA REPAIR Bilateral        Social History   Substance Use Topics    Smoking status: Never Smoker    Smokeless tobacco: Never Used    Alcohol use No       No Known Allergies    Outpatient Prescriptions Marked as Taking for the 3/21/17 encounter (Office Visit) with Christine Boyd MD   Medication Sig Dispense Refill    gabapentin (NEURONTIN) 100 mg capsule Take 100 mg by mouth nightly.  nitroglycerin (NITROSTAT) 0.4 mg SL tablet by SubLINGual route every five (5) minutes as needed for Chest Pain.  simvastatin (ZOCOR) 20 mg tablet Take 0.5 Tabs by mouth nightly. 30 Tab 4    meloxicam (MOBIC) 15 mg tablet Take 15 mg by mouth daily.       carvedilol (COREG) 3.125 mg tablet Take 1 Tab by mouth two (2) times daily (with meals). 60 Tab 3    cilostazol (PLETAL) 50 mg tablet Take 1 Tab by mouth Before breakfast and dinner. 60 Tab 4    furosemide (LASIX) 40 mg tablet Take 1 Tab by mouth two (2) times a day. (Patient taking differently: Take 40 mg by mouth as needed.) 60 Tab 4    Comp. Stocking,Thigh,Reg,Medium misc 1 Package by Does Not Apply route daily. 2 Package 0        Visit Vitals    /78    Pulse 68    Ht 6' 2\" (1.88 m)    Wt 84.8 kg (187 lb)    BMI 24.01 kg/m2     Physical Exam   Constitutional: He is oriented to person, place, and time. He appears well-developed and well-nourished. No distress. HENT:   Head: Atraumatic. Mouth/Throat: No oropharyngeal exudate. Eyes: Conjunctivae are normal. No scleral icterus. Neck: Neck supple. No JVD present. Cardiovascular: Normal rate and regular rhythm. Exam reveals no gallop. No murmur heard. Pulmonary/Chest: Effort normal and breath sounds normal. No stridor. He has no wheezes. He has no rales. Abdominal: Soft. There is no tenderness. There is no rebound. Musculoskeletal: Normal range of motion. He exhibits edema (improving since last visit). Neurological: He is alert and oriented to person, place, and time. He exhibits normal muscle tone. Skin: Skin is warm. He is not diaphoretic. Psychiatric: He has a normal mood and affect. His behavior is normal.     6/16 Cardiac Cath  FINDINGS:  1. Left main is patent and bifurcates into left anterior descending artery  and circumflex artery. 2. Left anterior descending artery has mild ectasia with moderate to severe  stenosis in the proximal portion. It appears to be a napkin ring type  lesion. By IVUS imaging, we obtained a stenosis of 62% with an area of 3.7  mm2. Mid to distal left anterior descending artery had wall irregularities  with sluggish flow, suggestive of severe microvascular disease. Apical LAD  had wall irregularities.   3. Diagonal 1 and diagonal 2 artery appeared to be small caliber vessel  with wall irregularities. 4. Circumflex artery is codominant with sluggish flow consistent with  microvascular disease. 5. Right coronary artery is codominant with sluggish flow suggestive of  microvascular disease. 6. Left ventricular end-diastolic pressure was 13 mmHg.     CONCLUSION: Single-vessel coronary artery disease.  Left anterior  descending artery stenosis was 62% in the proximal portion by intravascular  ultrasound imaging. Normal left ventricular and diastolic pressure. The  patient is to be on intense medical management and risk factor  modification. ASSESSMENT and PLAN    ICD-10-CM ICD-9-CM    1. Coronary artery disease of native artery of native heart with stable angina pectoris ([facility] [address] I25.118 414.01      413.9    2. Dyslipidemia E78.5 272.4    3. Benign essential HTN I10 401.1    4. Chronic diastolic heart failure (HCC) I50.32 428.32    5. PAD (peripheral artery disease) ([facility]) I73.9 443.9    6. LV dysfunction I51.9 429.9     EF 45%     No orders of the defined types were placed in this encounter. Follow-up Disposition:  Return in about 4 months (around 7/21/2017). Patient with LV dysfunction/ abnormal stress test-- had cardiac cath-62% LAD stenosis by IVUS imaging. Will add statin and PPI for GERD. Has issues with medication complaince- takes coreg once daily and diuretics as needed. Discussed with patient at length regarding the above. Will follow up in 4months.

## 2017-03-21 NOTE — MR AVS SNAPSHOT
Visit Information Date & Time Provider Department Dept. Phone Encounter #  
 3/21/2017 12:45 PM Colton Hightower MD [facility] [address] [place] [address] [id number] Follow-up Instructions Return in about 4 months (around 7/21/2017). Follow-up and Disposition History Your Appointments 9/5/2017 11:30 AM  
ESTABLISHED PATIENT with Colton Hightower MD  
[facility]) Appt Note: 6 months [address] [facility], [address] [place] [address]  
[facility] Upcoming Health Maintenance Date Due DTaP/Tdap/Td series (1 - Tdap) 5/25/1965 FOBT Q 1 YEAR AGE 50-75 5/25/1994 ZOSTER VACCINE AGE 60> 5/25/2004 GLAUCOMA SCREENING Q2Y 5/25/2009 Pneumococcal 65+ Low/Medium Risk (1 of 2 - PCV13) 5/25/2009 MEDICARE YEARLY EXAM 5/25/2009 INFLUENZA AGE 9 TO ADULT 8/1/2016 Allergies as of 3/21/2017  Review Complete On: 3/21/2017 By: Colton Hightower MD  
 No Known Allergies Current Immunizations  Never Reviewed No immunizations on file. Not reviewed this visit You Were Diagnosed With   
  
 Codes Comments Coronary artery disease of native artery of native heart with stable angina pectoris ([facility] [address]    -  Primary ICD-10-CM: I25.118 
ICD-9-CM: 414.01, 413.9 Dyslipidemia     ICD-10-CM: E78.5 ICD-9-CM: 272.4 Benign essential HTN     ICD-10-CM: I10 
ICD-9-CM: 824. 1 Chronic diastolic heart failure (HCC)     ICD-10-CM: I50.32 
ICD-9-CM: 428.32   
 PAD (peripheral artery disease) (HCC)     ICD-10-CM: I73.9 ICD-9-CM: 443.9 LV dysfunction     ICD-10-CM: I51.9 ICD-9-CM: 429.9 EF 45% Vitals BP Pulse Height(growth percentile) Weight(growth percentile) BMI Smoking Status 121/78 68 6' 2\" (1.88 m) 187 lb (84.8 kg) 24.01 kg/m2 Never Smoker Vitals History BMI and BSA Data Body Mass Index Body Surface Area 24.01 kg/m 2 2.1 m 2 Preferred Pharmacy Pharmacy Name Phone [facility], [address] Your Updated Medication List  
  
   
This list is accurate as of: 3/21/17 12:45 PM.  Always use your most recent med list.  
  
  
  
  
 ARGININE (L-ARGININE) PO Take  by mouth. aspirin 81 mg chewable tablet Take 325 mg by mouth daily. carvedilol 3.125 mg tablet Commonly known as:  Phineas Hoar Take 1 Tab by mouth two (2) times daily (with meals). cilostazol 50 mg tablet Commonly known as:  PLETAL Take 1 Tab by mouth Before breakfast and dinner. citalopram 20 mg tablet Commonly known as:  Stark Lindau Take 20 mg by mouth daily. Comp. Stocking,Thigh,Reg,Medium Misc  
1 Package by Does Not Apply route daily. furosemide 40 mg tablet Commonly known as:  LASIX Take 1 Tab by mouth two (2) times a day.  
  
 gabapentin 100 mg capsule Commonly known as:  NEURONTIN Take 100 mg by mouth nightly. lisinopril 2.5 mg tablet Commonly known as:  Sanderson Rylan Take 1 Tab by mouth daily. meloxicam 15 mg tablet Commonly known as:  MOBIC Take 15 mg by mouth daily. nitroglycerin 0.4 mg SL tablet Commonly known as:  NITROSTAT  
by SubLINGual route every five (5) minutes as needed for Chest Pain. omeprazole 20 mg capsule Commonly known as:  PRILOSEC Take 1 Cap by mouth daily. oxyCODONE-acetaminophen 5-325 mg per tablet Commonly known as:  PERCOCET Take 1 Tab by mouth every four (4) hours as needed for Pain. Max Daily Amount: 6 Tabs. simvastatin 20 mg tablet Commonly known as:  ZOCOR Take 0.5 Tabs by mouth nightly. Follow-up Instructions Return in about 4 months (around 7/21/2017). To-Do List   
 04/04/2017 9:00 AM  
  [facility]; [facility] RM 1 at [address] [facility] ([phone]) Please have patient bring a copy of their order if same day add-on. It can also be faxed to [place] [facility] - [phone]. There are no restrictions for this study. The patient can eat breakfast and take their medications. Introducing \[facility]\"" & HEALTH SERVICES! Zoey Hathawayjennie introduces Everpix patient portal. Now you can access parts of your medical record, email your doctor's office, and request medication refills online. 1. In your internet browser, go to [email or web address] 2. Click on the First Time User? Click Here link in the Sign In box. You will see the New Member Sign Up page. 3. Enter your Everpix Access Code exactly as it appears below. You will not need to use this code after youve completed the sign-up process. If you do not sign up before the expiration date, you must request a new code. · Everpix Access Code: [email or web address] Expires: 4/24/2017  2:22 PM 
 
4. Enter the last four digits of your Social Security Number (xxxx) and Date of Birth (mm/dd/yyyy) as indicated and click Submit. You will be taken to the next sign-up page. 5. Create a Everpix ID. This will be your Everpix login ID and cannot be changed, so think of one that is secure and easy to remember. 6. Create a Everpix password. You can change your password at any time. 7. Enter your Password Reset Question and Answer. This can be used at a later time if you forget your password. 8. Enter your e-mail address. You will receive e-mail notification when new information is available in [address] 9. Click Sign Up. You can now view and download portions of your medical record. 10. Click the Download Summary menu link to download a portable copy of your medical information. If you have questions, please visit the Frequently Asked Questions section of the Everpix website. Remember, Everpix is NOT to be used for urgent needs. For medical emergencies, dial 911. Now available from your iPhone and Android! Please provide this summary of care documentation to your next provider. Your primary care clinician is listed as Tami Engel II. If you have any questions after today's visit, please call [phone].

## 2017-03-21 NOTE — LETTER
[place] 1945 
 
3/21/2017 Dear Aries Encarnacion MD 
 
I had the pleasure of evaluating  Mr. Nicole Bennett in office today. Below are the relevant portions of my assessment and plan of care. ICD-10-CM ICD-9-CM 1. Coronary artery disease of native artery of native heart with stable angina pectoris ([facility] [address] I25.118 414.01   
  413.9 2. Dyslipidemia E78.5 272.4 3. Benign essential HTN I10 401.1 4. Chronic diastolic heart failure (HCC) I50.32 428.32   
5. PAD (peripheral artery disease) ([facility]) I73.9 443.9 6. LV dysfunction I51.9 429.9 EF 45% Current Outpatient Prescriptions Medication Sig Dispense Refill  gabapentin (NEURONTIN) 100 mg capsule Take 100 mg by mouth nightly.  nitroglycerin (NITROSTAT) 0.4 mg SL tablet by SubLINGual route every five (5) minutes as needed for Chest Pain.  simvastatin (ZOCOR) 20 mg tablet Take 0.5 Tabs by mouth nightly. 30 Tab 4  
 meloxicam (MOBIC) 15 mg tablet Take 15 mg by mouth daily.  carvedilol (COREG) 3.125 mg tablet Take 1 Tab by mouth two (2) times daily (with meals). 60 Tab 3  
 cilostazol (PLETAL) 50 mg tablet Take 1 Tab by mouth Before breakfast and dinner. 60 Tab 4  furosemide (LASIX) 40 mg tablet Take 1 Tab by mouth two (2) times a day. (Patient taking differently: Take 40 mg by mouth as needed.) 60 Tab 4  Comp. Stocking,Thigh,Reg,Medium misc 1 Package by Does Not Apply route daily. 2 Package 0  
 ARGININE, L-ARGININE, PO Take  by mouth.  omeprazole (PRILOSEC) 20 mg capsule Take 1 Cap by mouth daily. 30 Cap 4  
 lisinopril (PRINIVIL, ZESTRIL) 2.5 mg tablet Take 1 Tab by mouth daily. 90 Tab 3  
 citalopram (CELEXA) 20 mg tablet Take 20 mg by mouth daily.  oxyCODONE-acetaminophen (PERCOCET) 5-325 mg per tablet Take 1 Tab by mouth every four (4) hours as needed for Pain. Max Daily Amount: 6 Tabs. 20 Tab 0  
 aspirin 81 mg chewable tablet Take 325 mg by mouth daily. Orders Placed This Encounter  gabapentin (NEURONTIN) 100 mg capsule Sig: Take 100 mg by mouth nightly.  nitroglycerin (NITROSTAT) 0.4 mg SL tablet Sig: by SubLINGual route every five (5) minutes as needed for Chest Pain. If you have questions, please do not hesitate to call me. I look forward to following Mr. Chas Sweeney along with you.  
 
Sincerely, 
Christine Boyd MD

## 2017-03-21 NOTE — PROGRESS NOTES
1. Have you been to the ER, urgent care clinic since your last visit? Hospitalized since your last visit? 2/23 For chest pain/ [facility] ER     2. Have you seen or consulted any other health care providers outside of the [address] [place] [address] since your last visit? Include any pap smears or colon screening. No     3. Since your last visit, have you had any of the following symptoms? Swelling in legs     4. Have you had any blood work, X-rays or cardiac testing? Yes in cc     5. Where do you normally have your labs drawn? [facility]    6. Do you need any refills today?   NO          Medications confirmed by patient's medication bottles

## 2017-04-04 ENCOUNTER — HOSPITAL ENCOUNTER (OUTPATIENT)
Dept: VASCULAR SURGERY | Age: 72
Discharge: HOME | End: 2017-04-04
Attending: PODIATRIST
Payer: MEDICARE

## 2017-04-04 DIAGNOSIS — I70.223 ATHEROSCLEROSIS OF NATIVE ARTERY OF BOTH LOWER EXTREMITIES WITH REST PAIN (HCC): ICD-10-CM

## 2017-04-04 PROCEDURE — 93923 UPR/LXTR ART STDY 3+ LVLS: CPT

## 2017-04-04 NOTE — PROCEDURES
[facility]  *** FINAL REPORT ***    Name: Martinez Guevara  MRN: [id number]    Outpatient  : 25 May 1945  HIS Order #: [id number]  [address] [facility] Visit #: [id number]  Date: 2017    TYPE OF TEST: Peripheral Arterial Testing    REASON FOR TEST  Limb pain    Right Leg  Doppler:    Normal  Ankle/Brachial: 2.13    Left Leg  Doppler:    Normal  Ankle/Brachial: 2.13    INTERPRETATION/FINDINGS  Physiologic testing was performed using continuous wave Doppler and  segmental pressures. 1. No evidence of significant peripheral arterial disease at rest in  the right leg by waveform analysis. 2. No evidence of significant peripheral arterial disease at rest in  the left leg by waveform analysis. 3. The right ankle/brachial index is 2.13 and the left ankle/brachial  index is 2.13. There is evidence of arterial calcification therefor  the ankle/brachial index is not a reliable indicator of distal  arterial flow. 4. Segmental pressures were not obtained due to calf pain. 5. The digit brachial index is 0.58 on the right and 0.61 on the left. ADDITIONAL COMMENTS    I have personally reviewed the data relevant to the interpretation of  this  study.     TECHNOLOGIST: Festus Boast, RCIS, RVS  Signed: 2017 12:34 PM    PHYSICIAN: Avelino Denis MD  Signed: 2017 01:00 PM

## 2017-05-05 ENCOUNTER — HOSPITAL ENCOUNTER (EMERGENCY)
Age: 72
Discharge: HOME | End: 2017-05-05
Attending: EMERGENCY MEDICINE
Payer: MEDICARE

## 2017-05-05 ENCOUNTER — APPOINTMENT (OUTPATIENT)
Dept: CT IMAGING | Age: 72
End: 2017-05-05
Attending: EMERGENCY MEDICINE
Payer: MEDICARE

## 2017-05-05 VITALS
HEART RATE: 69 BPM | SYSTOLIC BLOOD PRESSURE: 112 MMHG | OXYGEN SATURATION: 98 % | RESPIRATION RATE: 11 BRPM | WEIGHT: 179 LBS | DIASTOLIC BLOOD PRESSURE: 76 MMHG | HEIGHT: 74 IN | BODY MASS INDEX: 22.97 KG/M2 | TEMPERATURE: 97.4 F

## 2017-05-05 DIAGNOSIS — R07.9 ACUTE CHEST PAIN: Primary | ICD-10-CM

## 2017-05-05 LAB
ANION GAP BLD CALC-SCNC: 5 MMOL/L (ref 3–18)
ATRIAL RATE: 70 BPM
BASOPHILS # BLD AUTO: 0 K/UL (ref 0–0.06)
BASOPHILS # BLD: 0 % (ref 0–2)
BUN SERPL-MCNC: 13 MG/DL (ref 7–18)
BUN/CREAT SERPL: 13 (ref 12–20)
CALCIUM SERPL-MCNC: 9.5 MG/DL (ref 8.5–10.1)
CALCULATED R AXIS, ECG10: 39 DEGREES
CALCULATED T AXIS, ECG11: 50 DEGREES
CHLORIDE SERPL-SCNC: 104 MMOL/L (ref 100–108)
CK MB CFR SERPL CALC: 2 % (ref 0–4)
CK MB SERPL-MCNC: 2.5 NG/ML (ref 5–25)
CK SERPL-CCNC: 125 U/L (ref 39–308)
CO2 SERPL-SCNC: 30 MMOL/L (ref 21–32)
CREAT SERPL-MCNC: 0.97 MG/DL (ref 0.6–1.3)
D DIMER PPP FEU-MCNC: 0.55 UG/ML(FEU)
DIAGNOSIS, 93000: NORMAL
DIFFERENTIAL METHOD BLD: ABNORMAL
EOSINOPHIL # BLD: 0.2 K/UL (ref 0–0.4)
EOSINOPHIL NFR BLD: 5 % (ref 0–5)
ERYTHROCYTE [DISTWIDTH] IN BLOOD BY AUTOMATED COUNT: 14.4 % (ref 11.6–14.5)
GLUCOSE SERPL-MCNC: 76 MG/DL (ref 74–99)
HCT VFR BLD AUTO: 46.7 % (ref 36–48)
HGB BLD-MCNC: 15.7 G/DL (ref 13–16)
LYMPHOCYTES # BLD AUTO: 47 % (ref 21–52)
LYMPHOCYTES # BLD: 2.3 K/UL (ref 0.9–3.6)
MCH RBC QN AUTO: 29.5 PG (ref 24–34)
MCHC RBC AUTO-ENTMCNC: 33.6 G/DL (ref 31–37)
MCV RBC AUTO: 87.6 FL (ref 74–97)
MONOCYTES # BLD: 0.4 K/UL (ref 0.05–1.2)
MONOCYTES NFR BLD AUTO: 9 % (ref 3–10)
NEUTS SEG # BLD: 1.9 K/UL (ref 1.8–8)
NEUTS SEG NFR BLD AUTO: 39 % (ref 40–73)
P-R INTERVAL, ECG05: 128 MS
PLATELET # BLD AUTO: 210 K/UL (ref 135–420)
PMV BLD AUTO: 9.1 FL (ref 9.2–11.8)
POTASSIUM SERPL-SCNC: 3.9 MMOL/L (ref 3.5–5.5)
Q-T INTERVAL, ECG07: 416 MS
QRS DURATION, ECG06: 84 MS
QTC CALCULATION (BEZET), ECG08: 455 MS
RBC # BLD AUTO: 5.33 M/UL (ref 4.7–5.5)
SODIUM SERPL-SCNC: 139 MMOL/L (ref 136–145)
TROPONIN I BLD-MCNC: <0.04 NG/ML (ref 0–0.08)
TROPONIN I SERPL-MCNC: <0.02 NG/ML (ref 0–0.04)
TROPONIN I SERPL-MCNC: <0.02 NG/ML (ref 0–0.04)
VENTRICULAR RATE, ECG03: 72 BPM
WBC # BLD AUTO: 5 K/UL (ref 4.6–13.2)

## 2017-05-05 PROCEDURE — 85025 COMPLETE CBC W/AUTO DIFF WBC: CPT | Performed by: EMERGENCY MEDICINE

## 2017-05-05 PROCEDURE — 85379 FIBRIN DEGRADATION QUANT: CPT | Performed by: EMERGENCY MEDICINE

## 2017-05-05 PROCEDURE — 84484 ASSAY OF TROPONIN QUANT: CPT | Performed by: EMERGENCY MEDICINE

## 2017-05-05 PROCEDURE — 80048 BASIC METABOLIC PNL TOTAL CA: CPT | Performed by: EMERGENCY MEDICINE

## 2017-05-05 PROCEDURE — 93005 ELECTROCARDIOGRAM TRACING: CPT

## 2017-05-05 PROCEDURE — 99285 EMERGENCY DEPT VISIT HI MDM: CPT

## 2017-05-05 PROCEDURE — 74011636320 HC RX REV CODE- 636/320: Performed by: EMERGENCY MEDICINE

## 2017-05-05 PROCEDURE — 71275 CT ANGIOGRAPHY CHEST: CPT

## 2017-05-05 PROCEDURE — 82550 ASSAY OF CK (CPK): CPT | Performed by: EMERGENCY MEDICINE

## 2017-05-05 RX ADMIN — IOPAMIDOL 50 ML: 755 INJECTION, SOLUTION INTRAVENOUS at 13:04

## 2017-05-05 NOTE — ED NOTES
Patient states that when he is ambulating he has SOB and pain in both legs  His pain resolves with resting. There is no edema noted. Patient is placed on the monitor at this time. VSS. He is Sinus rhythm and denies chest pain currently. He does however have some pain in his lower extremities and rates it 4/10. Call bell within reach. No additional wants or needs noted.

## 2017-05-05 NOTE — DISCHARGE INSTRUCTIONS
Chest Pain: Care Instructions  Your Care Instructions  There are many things that can cause chest pain. Some are not serious and will get better on their own in a few days. But some kinds of chest pain need more testing and treatment. Your doctor may have recommended a follow-up visit in the next 8 to 12 hours. If you are not getting better, you may need more tests or treatment. Even though your doctor has released you, you still need to watch for any problems. The doctor carefully checked you, but sometimes problems can develop later. If you have new symptoms or if your symptoms do not get better, get medical care right away. If you have worse or different chest pain or pressure that lasts more than 5 minutes or you passed out (lost consciousness), call 911 or seek other emergency help right away. A medical visit is only one step in your treatment. Even if you feel better, you still need to do what your doctor recommends, such as going to all suggested follow-up appointments and taking medicines exactly as directed. This will help you recover and help prevent future problems. How can you care for yourself at home? · Rest until you feel better. · Take your medicine exactly as prescribed. Call your doctor if you think you are having a problem with your medicine. · Do not drive after taking a prescription pain medicine. When should you call for help? Call 911 if:  · You passed out (lost consciousness). · You have severe difficulty breathing. · You have symptoms of a heart attack. These may include:  ¨ Chest pain or pressure, or a strange feeling in your chest.  ¨ Sweating. ¨ Shortness of breath. ¨ Nausea or vomiting. ¨ Pain, pressure, or a strange feeling in your back, neck, jaw, or upper belly or in one or both shoulders or arms. ¨ Lightheadedness or sudden weakness. ¨ A fast or irregular heartbeat.   After you call 911, the  may tell you to chew 1 adult-strength or 2 to 4 low-dose aspirin. Wait for an ambulance. Do not try to drive yourself. Call your doctor today if:  · You have any trouble breathing. · Your chest pain gets worse. · You are dizzy or lightheaded, or you feel like you may faint. · You are not getting better as expected. · You are having new or different chest pain. Where can you learn more? Go to http://hugo-sherley.info/. Enter A120 in the search box to learn more about \"Chest Pain: Care Instructions. \"  Current as of: May 27, 2016  Content Version: 11.2  © 1689-2389 [employer]. Care instructions adapted under license by [employer] (which disclaims liability or warranty for this information). If you have questions about a medical condition or this instruction, always ask your healthcare professional. [address] any warranty or liability for your use of this information.

## 2017-05-05 NOTE — CONSULTS
[facility] - Consult Note    Date of  Admission: 5/5/2017  9:58 AM     Primary Care Physician:  Parish Brumfield MD     Plan:     1. Atypical chest pain- currently chest pain free. No SOB. Chest pain is sharp lasted for few minutes and worsened with depth breathing. EKG revealed NSR w/acute st-t changes. Negative. First troponin. Check second set if negative patient can be d/c home. Follow with Dr. Jaimee Phillip 1-2 weeks. D/c Pletal. Continue asa and bb.  2. CAD- had cardiac in 2016  cath-62% LAD stenosis by IVUS imaging  3. Hypertension-stable continue home medications   4. Hyperlipidemia-on statin  5. Hx of PAD- stop Pletal.     Patient with known moderate LAD stenosis admitted with chest pain- atypical presentation. Serial cardiac enzymes negative for MI. No ekg changes concerning for ischemia. Will discontinue pletal due to chronic CHF  Continue current meds  Outpatient f/u in 2 weeks. Assessment:     Hospital Problems  Date Reviewed: 2/3/2016          Codes Class Noted POA    LV dysfunction ICD-10-CM: I51.9  ICD-9-CM: 429.9  3/21/2017 Yes        Coronary artery disease of native artery with stable angina pectoris ([facility] ICD-10-CM: I25.118  ICD-9-CM: 414.01, 413.9  8/2/2016 Yes        Angina pectoris ([facility] ICD-10-CM: I20.9  ICD-9-CM: 413.9  6/21/2016 Yes        PAD (peripheral artery disease) ([facility] ICD-10-CM: I73.9  ICD-9-CM: 443.9  5/24/2016 Yes        Claudication ([facility] ICD-10-CM: I73.9  ICD-9-CM: 443.9  2/23/2016 Yes        Benign essential HTN ICD-10-CM: I10  ICD-9-CM: 401.1  2/17/2016 Yes        Chronic diastolic heart failure ([facility] ICD-10-CM: I50.32  ICD-9-CM: 428.32  2/17/2016 Yes        Abnormal nuclear stress test ICD-10-CM: R94.39  ICD-9-CM: 794.39  2/17/2016 Yes        Peripheral edema ICD-10-CM: R60.9  ICD-9-CM: 782.3  6/11/2015 Yes                   History of Present Illness: This is a 66 y.o. male admitted for atypical chest pain.  Patient with PMHx of hypertension, CAD, PAD and hyperlipidemia. who presents to the ED for the evaluation of pleuritic chest pain. Pt describes his pain as sharp and lasting for a few minutes Also reports chronic nonproductive cough and exertional dyspnea. Denies fever. Patient states that his pain is exacerbated with deep inspiration and swallowing. Patient is very poor historian. Currently resting in bed no distress noted. No chest  pain or pressure. No dizziness or lightheadedness. No fever or chills. No diaphoresis. No leg swelling. No recent syncopal episodes. No blood in stool or urine. No nausea or vomit. No recent CVA. Past Medical History:     Past Medical History:   Diagnosis Date    Benign essential HTN 2/17/2016    Dyslipidemia 1/24/2017         Social History:     Social History     Social History    Marital status: SINGLE     Spouse name: N/A    Number of children: N/A    Years of education: N/A     Social History Main Topics    Smoking status: Never Smoker    Smokeless tobacco: Never Used    Alcohol use No    Drug use: No    Sexual activity: Not on file     Other Topics Concern    Not on file     Social History Narrative        Family History:     Family History   Problem Relation Age of Onset    Heart Attack Neg Hx     Stroke Neg Hx         Medications:   No Known Allergies     No current facility-administered medications for this encounter. Current Outpatient Prescriptions   Medication Sig    gabapentin (NEURONTIN) 100 mg capsule Take 100 mg by mouth nightly.  nitroglycerin (NITROSTAT) 0.4 mg SL tablet by SubLINGual route every five (5) minutes as needed for Chest Pain.  ARGININE, L-ARGININE, PO Take  by mouth.  simvastatin (ZOCOR) 20 mg tablet Take 0.5 Tabs by mouth nightly.  omeprazole (PRILOSEC) 20 mg capsule Take 1 Cap by mouth daily.  lisinopril (PRINIVIL, ZESTRIL) 2.5 mg tablet Take 1 Tab by mouth daily.  citalopram (CELEXA) 20 mg tablet Take 20 mg by mouth daily.     meloxicam (MOBIC) 15 mg tablet Take 15 mg by mouth daily.  oxyCODONE-acetaminophen (PERCOCET) 5-325 mg per tablet Take 1 Tab by mouth every four (4) hours as needed for Pain. Max Daily Amount: 6 Tabs.  carvedilol (COREG) 3.125 mg tablet Take 1 Tab by mouth two (2) times daily (with meals).  aspirin 81 mg chewable tablet Take 325 mg by mouth daily.  cilostazol (PLETAL) 50 mg tablet Take 1 Tab by mouth Before breakfast and dinner.  furosemide (LASIX) 40 mg tablet Take 1 Tab by mouth two (2) times a day. (Patient taking differently: Take 40 mg by mouth as needed.)    Comp. Stocking,Thigh,Reg,Medium misc 1 Package by Does Not Apply route daily. Review Of Systems:       Constitutional: No fever, no chills, no weight loss, no night sweats   HEENT: No epistaxis, no nasal drainage, no difficulty in swallowing, no redness in eyes  Respiratory: dyspnea on exertion. Cardiovascular: chest pain  Gastrointestinal: no abd pain, no vomiting, no diarrhea, no bleeding symptoms  Genitourinary: No urinary symptoms or hematuria  Integument/breast: No ulcers or rashes  Musculoskeletal: no muscle pain, no weakness  Neurological: No focal weakness, no seizures, no headaches  Behvioral/Psych: No anxiety, no depression       Physical Exam:     Visit Vitals    /85 (BP 1 Location: Left arm, BP Patient Position: Sitting)    Pulse 75    Temp 97.4 °F (36.3 °C)    Resp 18    Ht 6' 2\" (1.88 m)    Wt 81.2 kg (179 lb)    SpO2 100%    BMI 22.98 kg/m2     BP Readings from Last 3 Encounters:   05/05/17 124/85   03/21/17 121/78   02/23/17 108/81     Pulse Readings from Last 3 Encounters:   05/05/17 75   03/21/17 68   02/23/17 66     Wt Readings from Last 3 Encounters:   05/05/17 81.2 kg (179 lb)   03/21/17 84.8 kg (187 lb)   02/23/17 87.1 kg (192 lb)       General:  alert, cooperative, no distress, appears stated age  Skin: Warm and dry, acyanotic, normal color. Head: Normocephalic, atraumatic.     Eyes: Sclerae anicteric, conjunctivae without injection. Neck:  nontender, no nuchal rigidity, no masses, no stridor, no carotid bruit, no JVD  Lungs:  clear to auscultation bilaterally  Heart:  regular rate and rhythm, S1, S2 normal, no murmur, click, rub or gallop  Abdomen:  abdomen is soft without significant tenderness, masses, organomegaly or guarding  Extremities:  extremities normal, atraumatic, no cyanosis or edema  Neurological: grossly intact. No focal abnormalities, moves all extremities well. Psychiatric Affect: The patient is awake, alert and oriented x3. Antoine Villagran is interactive and appropriate. Data Review:     Recent Results (from the past 48 hour(s))   EKG, 12 LEAD, INITIAL    Collection Time: 05/05/17  8:17 AM   Result Value Ref Range    Ventricular Rate 72 BPM    Atrial Rate 70 BPM    P-R Interval 128 ms    QRS Duration 84 ms    Q-T Interval 416 ms    QTC Calculation (Bezet) 455 ms    Calculated R Axis 39 degrees    Calculated T Axis 50 degrees    Diagnosis       Sinus rhythm with premature supraventricular complexes  Nonspecific T wave abnormality  Abnormal ECG  When compared with ECG of 23-FEB-2017 07:35,  premature ventricular complexes are no longer present  premature supraventricular complexes are now present  Confirmed by Gail Weiss MD, ----- ([id number]) on 5/5/2017 10:13:48 AM     CBC WITH AUTOMATED DIFF    Collection Time: 05/05/17 11:23 AM   Result Value Ref Range    WBC 5.0 4.6 - 13.2 K/uL    RBC 5.33 4.70 - 5.50 M/uL    HGB 15.7 13.0 - 16.0 g/dL    HCT 46.7 36.0 - 48.0 %    MCV 87.6 74.0 - 97.0 FL    MCH 29.5 24.0 - 34.0 PG    MCHC 33.6 31.0 - 37.0 g/dL    RDW 14.4 11.6 - 14.5 %    PLATELET 320 317 - 172 K/uL    MPV 9.1 (L) 9.2 - 11.8 FL    NEUTROPHILS 39 (L) 40 - 73 %    LYMPHOCYTES 47 21 - 52 %    MONOCYTES 9 3 - 10 %    EOSINOPHILS 5 0 - 5 %    BASOPHILS 0 0 - 2 %    ABS. NEUTROPHILS 1.9 1.8 - 8.0 K/UL    ABS. LYMPHOCYTES 2.3 0.9 - 3.6 K/UL    ABS. MONOCYTES 0.4 0.05 - 1.2 K/UL    ABS.  EOSINOPHILS 0.2 0.0 - 0.4 K/UL    ABS. BASOPHILS 0.0 0.0 - 0.06 K/UL    DF AUTOMATED     METABOLIC PANEL, BASIC    Collection Time: 05/05/17 11:23 AM   Result Value Ref Range    Sodium 139 136 - 145 mmol/L    Potassium 3.9 3.5 - 5.5 mmol/L    Chloride 104 100 - 108 mmol/L    CO2 30 21 - 32 mmol/L    Anion gap 5 3.0 - 18 mmol/L    Glucose 76 74 - 99 mg/dL    BUN 13 7.0 - 18 MG/DL    Creatinine 0.97 0.6 - 1.3 MG/DL    BUN/Creatinine ratio 13 12 - 20      GFR est AA >60 >60 ml/min/1.73m2    GFR est non-AA >60 >60 ml/min/1.73m2    Calcium 9.5 8.5 - 10.1 MG/DL   CARDIAC PANEL,(CK, CKMB & TROPONIN)    Collection Time: 05/05/17 11:23 AM   Result Value Ref Range     39 - 308 U/L    CK - MB 2.5 <3.6 ng/ml    CK-MB Index 2.0 0.0 - 4.0 %    Troponin-I, Qt. <0.02 0.0 - 0.045 NG/ML   D DIMER    Collection Time: 05/05/17 11:23 AM   Result Value Ref Range    D DIMER 0.55 (H) <0.46 ug/ml(FEU)   POC TROPONIN-I    Collection Time: 05/05/17 11:47 AM   Result Value Ref Range    Troponin-I (POC) <0.04 0.00 - 0.08 ng/mL   TROPONIN I    Collection Time: 05/05/17  2:23 PM   Result Value Ref Range    Troponin-I, Qt. <0.02 0.0 - 0.045 NG/ML       No intake or output data in the 24 hours ending 05/05/17 1518    Cardiographics:     ECG: NSR w/o acute ST-T changes     Signed By: Ladarius Farmer NP supervised    May 5, 2017      I have independently evaluated and examined the patient. All relevant labs and testing data's are reviewed. Care plan discussed and updated after review.     Latrice Sanders MD

## 2017-05-05 NOTE — ED NOTES
I have reviewed discharge instructions with the patient. The patient verbalized understanding.  Patient is ambulatory on discharge and denies pain

## 2017-05-05 NOTE — ED TRIAGE NOTES
Pt, c/o on & off chest pain  For  Over a month & when he breath, also c/o   Pain on  Both legs & feet for about six months.

## 2017-05-05 NOTE — ED PROVIDER NOTES
HPI Comments:   10:03 AM Kishan Thomas is a 66 y.o. male with a h/o HTN and HLD, who presents to the ED for the evaluation of pleuritic chest painand leg swelling x a couple months. Pt describes his pain as sharp and lasting for a few minutes Also reports chronic nonproductive cough and exertional dyspnea. Denies fever. Pt states that he has contacted his PCP and cardiologist, who advised that he be seen in the ED, in the event his sx worsen. . Pt states that his pain is exacerbated with deep inspiration and swallowing. No recent long distance travel or surgery, Denies any other DVT/PE risk factors. No other complaints at this time. PCP: Nya Muir MD   Cardiologist: Dr. Diana Del Rio    The history is provided by the patient. Past Medical History:   Diagnosis Date    Benign essential HTN 2/17/2016    Dyslipidemia 1/24/2017       Past Surgical History:   Procedure Laterality Date    HX CHOLECYSTECTOMY      HX GI      HX HERNIA REPAIR Bilateral          Family History:   Problem Relation Age of Onset    Heart Attack Neg Hx     Stroke Neg Hx        Social History     Social History    Marital status: SINGLE     Spouse name: N/A    Number of children: N/A    Years of education: N/A     Occupational History    Not on file. Social History Main Topics    Smoking status: Never Smoker    Smokeless tobacco: Never Used    Alcohol use No    Drug use: No    Sexual activity: Not on file     Other Topics Concern    Not on file     Social History Narrative         ALLERGIES: Review of patient's allergies indicates no known allergies. Review of Systems   Constitutional: Negative for fever. Respiratory: Positive for cough and shortness of breath. Cardiovascular: Positive for chest pain and leg swelling. Neurological: Positive for dizziness.        Vitals:    05/05/17 0810   BP: 124/85   Pulse: 75   Resp: 18   Temp: 97.4 °F (36.3 °C)   SpO2: 100%   Weight: 81.2 kg (179 lb)   Height: 6' 2\" (1.88 m)        100% on RA, indicating adequate oxygenation. Physical Exam   Constitutional:   General:  Well-developed, well-nourished, no apparent distress. Head:  Normocephalic atraumatic. Eyes:  Pupils midrange extraocular movements intact. No pallor or conjunctival injection. Nose:  No rhinorrhea, inspection grossly normal.    Ears:  Grossly normal to inspection, no discharge. Mouth:  Mucous membranes moist, no appreciable intraoral lesion. Neck:  Trachea midline, no asymmetry. Chest:  Grossly normal inspection, symmetric chest rise. Pulmonary:  Clear to auscultation bilaterally no wheezes rhonchi or rales. Cardiovascular:  S1-S2 no murmurs rubs or gallops. Abdomen: Soft, nontender, nondistended no guarding rebound or peritoneal signs. Extremities:  Grossly normal to inspection, peripheral pulses intact. 1+ pitting edema symmetric bilaterally easily palpable dorsalis pedis pulses. Neurologic:  Alert and oriented no appreciable focal neurologic deficit. Psychiatric:  Grossly normal mood and affect. Nursing note reviewed, vital signs reviewed. MDM  Number of Diagnoses or Management Options  Diagnosis management comments: ED course:  Patient presents with chest pain, atypical features but it is exertional.  He does have a prior cardiac catheterization on file. EKG done at 0817 hrs.: Sinus rhythm heart rate 72 intervals within normal limits no ST changes or ectopy. Prior cardiac catheterization 6/16 single-vessel coronary artery disease stenosis of LAD 62% with plan for intensive medical management    Initial troponin negative, given his recurrent chest pain that is exertional in his known 62% LAD lesion we'll consult cardiology    Consult:  Discussed care with NP Thong Valdez. Standard discussion; including history of patients chief complaint, available diagnostic results, and treatment course.       ED Course       Procedures         EKG repeated at 1522 hrs.: Sinus rhythm heart rate 63 intervals are within normal limits, no ST changes. There is a single PVC    Repeat troponin negative    Patient was seen by Dr. Ovidio Cueva, recommends discharged follow-up as an outpatient    Patient's presentation, history, physical exam and laboratory evaluations were reviewed. At this time patient was felt to be stable for outpatient management and follow with primary care/specialist.  Patient was instructed to return to the emergency department with any concerns. Disposition:    Discharged home      Portions of this chart were created with Dragon medical speech to text program.   Unrecognized errors may be present. SCRIBE ATTESTATION STATEMENT  Documented by: Srikanth Darby. Veronique Deleon for, and in the presence of, Mickie Sol MD 10:04 AM     Signed by: Srikanth Darby. Parmer Trenton, [address] [place] [address], 5/5/2017 10:04 AM     PROVIDER ATTESTATION STATEMENT  I personally performed the services described in the documentation, reviewed the documentation, as recorded by the scribe in my presence, and it accurately and completely records my words and actions.   Mickie Sol MD

## 2017-05-08 LAB
ATRIAL RATE: 63 BPM
CALCULATED P AXIS, ECG09: -15 DEGREES
CALCULATED R AXIS, ECG10: 33 DEGREES
CALCULATED T AXIS, ECG11: -34 DEGREES
DIAGNOSIS, 93000: NORMAL
P-R INTERVAL, ECG05: 118 MS
Q-T INTERVAL, ECG07: 444 MS
QRS DURATION, ECG06: 76 MS
QTC CALCULATION (BEZET), ECG08: 454 MS
VENTRICULAR RATE, ECG03: 63 BPM

## 2017-05-09 DIAGNOSIS — I73.9 PERIPHERAL VASCULAR DISEASE, UNSPECIFIED (HCC): Primary | ICD-10-CM

## 2017-05-09 NOTE — TELEPHONE ENCOUNTER
Attempted to call patient to discontinue pletal and begin taking trental, d/t contraindicated in CHF. Message left on his phone and his next of kin also given message to call office for instructions.

## 2017-05-23 RX ORDER — PENTOXIFYLLINE 400 MG/1
400 TABLET, EXTENDED RELEASE ORAL
Qty: 90 TAB | Refills: 3 | Status: SHIPPED | OUTPATIENT
Start: 2017-05-23 | End: 2019-06-04

## 2017-05-23 NOTE — TELEPHONE ENCOUNTER
Patient called to discuss medication changes, he will discontinue taking pletal and begin taking trental as directed. He voices understanding and acceptance of this advice and will call back if any further questions or concerns.

## 2017-06-20 ENCOUNTER — HOSPITAL ENCOUNTER (EMERGENCY)
Age: 72
Discharge: HOME | End: 2017-06-20
Attending: EMERGENCY MEDICINE
Payer: MEDICARE

## 2017-06-20 ENCOUNTER — APPOINTMENT (OUTPATIENT)
Dept: GENERAL RADIOLOGY | Age: 72
End: 2017-06-20
Attending: EMERGENCY MEDICINE
Payer: MEDICARE

## 2017-06-20 VITALS
OXYGEN SATURATION: 99 % | TEMPERATURE: 97.9 F | HEIGHT: 74 IN | BODY MASS INDEX: 24.38 KG/M2 | DIASTOLIC BLOOD PRESSURE: 97 MMHG | SYSTOLIC BLOOD PRESSURE: 146 MMHG | RESPIRATION RATE: 18 BRPM | WEIGHT: 190 LBS | HEART RATE: 77 BPM

## 2017-06-20 DIAGNOSIS — R03.0 ELEVATED BLOOD PRESSURE READING: ICD-10-CM

## 2017-06-20 DIAGNOSIS — R06.00 DYSPNEA, UNSPECIFIED TYPE: ICD-10-CM

## 2017-06-20 DIAGNOSIS — M79.89 LEG SWELLING: Primary | ICD-10-CM

## 2017-06-20 LAB
ANION GAP BLD CALC-SCNC: 4 MMOL/L (ref 3–18)
APPEARANCE UR: CLEAR
ATRIAL RATE: 80 BPM
BASOPHILS # BLD AUTO: 0 K/UL (ref 0–0.1)
BASOPHILS # BLD: 0 % (ref 0–2)
BILIRUB UR QL: NEGATIVE
BNP SERPL-MCNC: 200 PG/ML (ref 0–900)
BUN SERPL-MCNC: 13 MG/DL (ref 7–18)
BUN/CREAT SERPL: 13 (ref 12–20)
CALCIUM SERPL-MCNC: 9.1 MG/DL (ref 8.5–10.1)
CALCULATED P AXIS, ECG09: 60 DEGREES
CALCULATED R AXIS, ECG10: 59 DEGREES
CALCULATED T AXIS, ECG11: -14 DEGREES
CHLORIDE SERPL-SCNC: 104 MMOL/L (ref 100–108)
CK MB CFR SERPL CALC: 2.5 % (ref 0–4)
CK MB SERPL-MCNC: 2.8 NG/ML (ref 5–25)
CK SERPL-CCNC: 111 U/L (ref 39–308)
CO2 SERPL-SCNC: 30 MMOL/L (ref 21–32)
COLOR UR: YELLOW
CREAT SERPL-MCNC: 0.99 MG/DL (ref 0.6–1.3)
DIAGNOSIS, 93000: NORMAL
DIFFERENTIAL METHOD BLD: ABNORMAL
EOSINOPHIL # BLD: 0.2 K/UL (ref 0–0.4)
EOSINOPHIL NFR BLD: 4 % (ref 0–5)
ERYTHROCYTE [DISTWIDTH] IN BLOOD BY AUTOMATED COUNT: 14.3 % (ref 11.6–14.5)
GLUCOSE SERPL-MCNC: 92 MG/DL (ref 74–99)
GLUCOSE UR STRIP.AUTO-MCNC: NEGATIVE MG/DL
HCT VFR BLD AUTO: 41.5 % (ref 36–48)
HGB BLD-MCNC: 14.4 G/DL (ref 13–16)
HGB UR QL STRIP: NEGATIVE
KETONES UR QL STRIP.AUTO: NEGATIVE MG/DL
LEUKOCYTE ESTERASE UR QL STRIP.AUTO: NEGATIVE
LYMPHOCYTES # BLD AUTO: 52 % (ref 21–52)
LYMPHOCYTES # BLD: 2.4 K/UL (ref 0.9–3.6)
MCH RBC QN AUTO: 29.8 PG (ref 24–34)
MCHC RBC AUTO-ENTMCNC: 34.7 G/DL (ref 31–37)
MCV RBC AUTO: 85.9 FL (ref 74–97)
MONOCYTES # BLD: 0.4 K/UL (ref 0.05–1.2)
MONOCYTES NFR BLD AUTO: 9 % (ref 3–10)
NEUTS SEG # BLD: 1.6 K/UL (ref 1.8–8)
NEUTS SEG NFR BLD AUTO: 35 % (ref 40–73)
NITRITE UR QL STRIP.AUTO: NEGATIVE
P-R INTERVAL, ECG05: 84 MS
PH UR STRIP: 5 [PH] (ref 5–8)
PLATELET # BLD AUTO: 234 K/UL (ref 135–420)
PMV BLD AUTO: 8.9 FL (ref 9.2–11.8)
POTASSIUM SERPL-SCNC: 4.2 MMOL/L (ref 3.5–5.5)
PROT UR STRIP-MCNC: NEGATIVE MG/DL
Q-T INTERVAL, ECG07: 394 MS
QRS DURATION, ECG06: 72 MS
QTC CALCULATION (BEZET), ECG08: 454 MS
RBC # BLD AUTO: 4.83 M/UL (ref 4.7–5.5)
SODIUM SERPL-SCNC: 138 MMOL/L (ref 136–145)
SP GR UR REFRACTOMETRY: 1.02 (ref 1–1.03)
TROPONIN I SERPL-MCNC: <0.02 NG/ML (ref 0–0.04)
UROBILINOGEN UR QL STRIP.AUTO: 0.2 EU/DL (ref 0.2–1)
VENTRICULAR RATE, ECG03: 80 BPM
WBC # BLD AUTO: 4.6 K/UL (ref 4.6–13.2)

## 2017-06-20 PROCEDURE — 77030029684 HC NEB SM VOL KT MONA -A

## 2017-06-20 PROCEDURE — 94640 AIRWAY INHALATION TREATMENT: CPT

## 2017-06-20 PROCEDURE — 81003 URINALYSIS AUTO W/O SCOPE: CPT | Performed by: EMERGENCY MEDICINE

## 2017-06-20 PROCEDURE — 80048 BASIC METABOLIC PNL TOTAL CA: CPT | Performed by: EMERGENCY MEDICINE

## 2017-06-20 PROCEDURE — 85025 COMPLETE CBC W/AUTO DIFF WBC: CPT | Performed by: EMERGENCY MEDICINE

## 2017-06-20 PROCEDURE — 93971 EXTREMITY STUDY: CPT

## 2017-06-20 PROCEDURE — 93005 ELECTROCARDIOGRAM TRACING: CPT

## 2017-06-20 PROCEDURE — 82550 ASSAY OF CK (CPK): CPT | Performed by: EMERGENCY MEDICINE

## 2017-06-20 PROCEDURE — 74011000250 HC RX REV CODE- 250: Performed by: EMERGENCY MEDICINE

## 2017-06-20 PROCEDURE — 99284 EMERGENCY DEPT VISIT MOD MDM: CPT

## 2017-06-20 PROCEDURE — 83880 ASSAY OF NATRIURETIC PEPTIDE: CPT | Performed by: EMERGENCY MEDICINE

## 2017-06-20 PROCEDURE — 71020 XR CHEST PA LAT: CPT

## 2017-06-20 RX ORDER — IPRATROPIUM BROMIDE AND ALBUTEROL SULFATE 2.5; .5 MG/3ML; MG/3ML
3 SOLUTION RESPIRATORY (INHALATION)
Status: COMPLETED | OUTPATIENT
Start: 2017-06-20 | End: 2017-06-20

## 2017-06-20 RX ADMIN — IPRATROPIUM BROMIDE AND ALBUTEROL SULFATE 3 ML: .5; 3 SOLUTION RESPIRATORY (INHALATION) at 13:10

## 2017-06-20 NOTE — ED NOTES
Pt off floor for lower duplex study, pt left ED in stable condition with no distress noted and no complaints voiced

## 2017-06-20 NOTE — DISCHARGE INSTRUCTIONS
Shortness of Breath: Care Instructions  Your Care Instructions  Shortness of breath has many causes. Sometimes conditions such as anxiety can lead to shortness of breath. Some people get mild shortness of breath when they exercise. Trouble breathing also can be a symptom of a serious problem, such as asthma, lung disease, emphysema, heart problems, and pneumonia. If your shortness of breath continues, you may need tests and treatment. Watch for any changes in your breathing and other symptoms. Follow-up care is a key part of your treatment and safety. Be sure to make and go to all appointments, and call your doctor if you are having problems. Its also a good idea to know your test results and keep a list of the medicines you take. How can you care for yourself at home? · Do not smoke or allow others to smoke around you. If you need help quitting, talk to your doctor about stop-smoking programs and medicines. These can increase your chances of quitting for good. · Get plenty of rest and sleep. · Take your medicines exactly as prescribed. Call your doctor if you think you are having a problem with your medicine. · Find healthy ways to deal with stress. ¨ Exercise daily. ¨ Get plenty of sleep. ¨ Eat regularly and well. When should you call for help? Call 911 anytime you think you may need emergency care. For example, call if:  · You have severe shortness of breath. · You have symptoms of a heart attack. These may include:  ¨ Chest pain or pressure, or a strange feeling in the chest.  ¨ Sweating. ¨ Shortness of breath. ¨ Nausea or vomiting. ¨ Pain, pressure, or a strange feeling in the back, neck, jaw, or upper belly or in one or both shoulders or arms. ¨ Lightheadedness or sudden weakness. ¨ A fast or irregular heartbeat. After you call 911, the  may tell you to chew 1 adult-strength or 2 to 4 low-dose aspirin. Wait for an ambulance. Do not try to drive yourself.   Call your doctor now or seek immediate medical care if:  · Your shortness of breath gets worse or you start to wheeze. Wheezing is a high-pitched sound when you breathe. · You wake up at night out of breath or have to prop your head up on several pillows to breathe. · You are short of breath after only light activity or while at rest.  Watch closely for changes in your health, and be sure to contact your doctor if:  · You do not get better over the next 1 to 2 days. Where can you learn more? Go to http://hugo-sherley.info/. Enter S780 in the search box to learn more about \"Shortness of Breath: Care Instructions. \"  Current as of: March 25, 2017  Content Version: 11.3  © 4788-4769 [employer]. Care instructions adapted under license by [employer] (which disclaims liability or warranty for this information). If you have questions about a medical condition or this instruction, always ask your healthcare professional. [place] [address] any warranty or liability for your use of this information. Elevated Blood Pressure: Care Instructions  Your Care Instructions    Blood pressure is a measure of how hard the blood pushes against the walls of your arteries. It's normal for blood pressure to go up and down throughout the day. But if it stays up over time, you have high blood pressure. Two numbers tell you your blood pressure. The first number is the systolic pressure. It shows how hard the blood pushes when your heart is pumping. The second number is the diastolic pressure. It shows how hard the blood pushes between heartbeats, when your heart is relaxed and filling with blood. An ideal blood pressure in adults is less than 120/80 (say \"120 over 80\"). High blood pressure is 140/90 or higher. You have high blood pressure if your top number is 140 or higher or your bottom number is 90 or higher, or both. The main test for high blood pressure is simple, fast, and painless. To diagnose high blood pressure, your doctor will test your blood pressure at different times. After testing your blood pressure, your doctor may ask you to test it again when you are home. If you are diagnosed with high blood pressure, you can work with your doctor to make a long-term plan to manage it. Follow-up care is a key part of your treatment and safety. Be sure to make and go to all appointments, and call your doctor if you are having problems. It's also a good idea to know your test results and keep a list of the medicines you take. How can you care for yourself at home? · Do not smoke. Smoking increases your risk for heart attack and stroke. If you need help quitting, talk to your doctor about stop-smoking programs and medicines. These can increase your chances of quitting for good. · Stay at a healthy weight. · Try to limit how much sodium you eat to less than 2,300 milligrams (mg) a day. Your doctor may ask you to try to eat less than 1,500 mg a day. · Be physically active. Get at least 30 minutes of exercise on most days of the week. Walking is a good choice. You also may want to do other activities, such as running, swimming, cycling, or playing tennis or team sports. · Avoid or limit alcohol. Talk to your doctor about whether you can drink any alcohol. · Eat plenty of fruits, vegetables, and low-fat dairy products. Eat less saturated and total fats. · Learn how to check your blood pressure at home. When should you call for help? Call your doctor now or seek immediate medical care if:  · Your blood pressure is much higher than normal (such as 180/110 or higher). · You think high blood pressure is causing symptoms such as:  ¨ Severe headache. ¨ Blurry vision. Watch closely for changes in your health, and be sure to contact your doctor if:  · You do not get better as expected. Where can you learn more? Go to http://hugo-sherley.info/.   Enter C586 in the search box to learn more about \"Elevated Blood Pressure: Care Instructions. \"  Current as of: April 3, 2017  Content Version: 11.3  © 3998-3349 [employer]. Care instructions adapted under license by [employer] (which disclaims liability or warranty for this information). If you have questions about a medical condition or this instruction, always ask your healthcare professional. [address] any warranty or liability for your use of this information.

## 2017-06-20 NOTE — ED NOTES
Pt stable no distress noted and no complaints voiced at this time, call bell within reach.  Urinal left at bedside per pt request

## 2017-06-20 NOTE — ED NOTES
I performed a brief evaluation, including history and physical, of the patient here in triage and I have determined that pt will need further treatment and evaluation from the main side ER physician. I have placed initial orders to help in expediting patients care.      June 20, 2017 at 11:35 AM - Maribel Dinero DO        Visit Vitals    BP (!) 146/97 (BP 1 Location: Left arm, BP Patient Position: Sitting)    Pulse 77    Temp 97.9 °F (36.6 °C)    Resp 18    Ht 6' 2\" (1.88 m)    Wt 86.2 kg (190 lb)    SpO2 99%    BMI 24.39 kg/m2

## 2017-06-20 NOTE — PROCEDURES
[facility]  *** FINAL REPORT ***    Name: Joleen Esquivel  MRN: [id number]  : 25 May 1945  HIS Order #: [id number]  [address] [facility] Visit #: [id number]  Date: 2017    TYPE OF TEST: Peripheral Venous Testing    REASON FOR TEST  Pain in limb, Limb swelling    Left Leg:-  Deep venous thrombosis:           No  Superficial venous thrombosis:    No  Deep venous insufficiency:        Not examined  Superficial venous insufficiency: Not examined      INTERPRETATION/FINDINGS  Left leg :  1. Deep vein(s) visualized include the common femoral, proximal  femoral, popliteal(fossa), posterior tibial and peroneal veins. 2. No evidence of deep venous thrombosis detected in the veins  visualized. 3. No evidence of deep vein thrombosis in the contralateral common  femoral vein. 4. Superficial vein(s) visualized include the great saphenous vein. 5. No evidence of superficial thrombosis detected. ADDITIONAL COMMENTS    I have personally reviewed the data relevant to the interpretation of  this  study.     TECHNOLOGIST: Cate Razo RVT  Signed: 2017 03:22 PM    PHYSICIAN: Samuel Mittal MD  Signed: 2017 03:25 PM

## 2017-06-20 NOTE — ED PROVIDER NOTES
HPI Comments: Patient with a history of hypertension, hyperlipidemia peripheral vascular disease presents via walk-in triage. Reports shortness of breath RIGHT leg pain difficulty urinating and multiple other complaints. His main complaint is of his leg pain and shortness of breath, denies any fever or cough no chest pain no current symptoms. He has intermittent RIGHT leg pain and does follow with a vascular doctor, recently saw a podiatrist who took care of his toes. He denies any history of PE or DVT recent travel or surgery but he does have swollen LEFT lower leg, he is not sure how long that has been there. Patient is a 67 y.o. male presenting with shortness of breath, lower extremity edema, and other event. Shortness of Breath   Associated symptoms include leg swelling. Pertinent negatives include no fever, no chest pain, no abdominal pain and no rash. Leg Swelling   Associated symptoms include shortness of breath. Pertinent negatives include no chest pain and no abdominal pain. Other   Associated symptoms include shortness of breath. Pertinent negatives include no chest pain and no abdominal pain. Past Medical History:   Diagnosis Date    Benign essential HTN 2/17/2016    Dyslipidemia 1/24/2017       Past Surgical History:   Procedure Laterality Date    HX CHOLECYSTECTOMY      HX GI      HX HERNIA REPAIR Bilateral          Family History:   Problem Relation Age of Onset    Heart Attack Neg Hx     Stroke Neg Hx        Social History     Social History    Marital status: SINGLE     Spouse name: N/A    Number of children: N/A    Years of education: N/A     Occupational History    Not on file.      Social History Main Topics    Smoking status: Never Smoker    Smokeless tobacco: Never Used    Alcohol use No    Drug use: No    Sexual activity: Not on file     Other Topics Concern    Not on file     Social History Narrative         ALLERGIES: Review of patient's allergies indicates no known allergies. Review of Systems   Constitutional: Negative for fever. HENT: Negative for nosebleeds. Eyes: Negative for visual disturbance. Respiratory: Positive for shortness of breath. Cardiovascular: Positive for leg swelling. Negative for chest pain. Gastrointestinal: Negative for abdominal pain. Genitourinary: Positive for difficulty urinating. Negative for dysuria. Musculoskeletal: Negative for myalgias. Skin: Negative for rash. Neurological: Negative for weakness. All other systems reviewed and are negative. Vitals:    06/20/17 1133   BP: (!) 146/97   Pulse: 77   Resp: 18   Temp: 97.9 °F (36.6 °C)   SpO2: 99%   Weight: 86.2 kg (190 lb)   Height: 6' 2\" (1.88 m)            Physical Exam   Constitutional:   General:  Well-developed, well-nourished, no apparent distress. Head:  Normocephalic atraumatic. Eyes:  Pupils midrange extraocular movements intact. No pallor or conjunctival injection. Nose:  No rhinorrhea, inspection grossly normal.    Ears:  Grossly normal to inspection, no discharge. Mouth:  Mucous membranes moist, no appreciable intraoral lesion. Neck/Back:  Trachea midline, no asymmetry. Chest:  Grossly normal inspection, symmetric chest rise. Pulmonary:  Clear to auscultation bilaterally no wheezes rhonchi or rales. Cardiovascular:  S1-S2 no murmurs rubs or gallops. Abdomen: Soft, nontender, nondistended no guarding rebound or peritoneal signs. Extremities:  Grossly normal to inspection, peripheral pulses intact  circumferential swelling without erythema or overlying skin changes, cyst from the LEFT ankle to the LEFT mid shin  Neurologic:  Alert and oriented no appreciable focal neurologic deficit. Skin:  Warm and dry  Psychiatric:  Grossly normal mood and affect. Nursing note reviewed, vital signs reviewed.           MDM  Number of Diagnoses or Management Options  Dyspnea, unspecified type:   Elevated blood pressure reading: Leg swelling:   Diagnosis management comments: ED course:  Patient presents with LEFT lower leg swelling, difficulty with urine stream and reported shortness of breath, will rule out ACS, chest x-ray, urinalysis and duplex lower extremity. He has no current chest pain or shortness of breath, no concern for PE at this time     X-ray unremarkable, duplex per radiology noted DVT, labs are unremarkable, he was reevaluated at 1542 hrs.: Feels better, we'll discharge him with albuterol, follow up with PCP, he understands that his initial duplex was negative but needs to be repeated as an outpatient to return here with any concerns     Patient's history, physical exam and laboratory evaluations were reviewed. Had discussion with the patient about the possible etiologies of chest pain. Heart score: 3     Patient was felt to be low risk for major adverse cardiac event, was  informed about the risks and benefits and alternatives of inpatient versus outpatient workup. At this time patient is stable for outpatient management including follow-up with primary care physician/cardiology for reevaluation within 72 hours. Patient is aware that no evaluation in the emergency department can ensure 0% risk, patient will return to the emergency department with any concerns. Disposition:     Discharged home        Portions of this chart were created with Dragon medical speech to text program.   Unrecognized errors may be present.       ED Course       Procedures

## 2017-06-30 RX ORDER — SIMVASTATIN 20 MG/1
10 TABLET, FILM COATED ORAL
Qty: 45 TAB | Refills: 2 | Status: SHIPPED | OUTPATIENT
Start: 2017-06-30 | End: 2019-06-04

## 2017-07-31 ENCOUNTER — APPOINTMENT (OUTPATIENT)
Dept: GENERAL RADIOLOGY | Age: 72
End: 2017-07-31
Attending: EMERGENCY MEDICINE
Payer: MEDICARE

## 2017-07-31 ENCOUNTER — HOSPITAL ENCOUNTER (EMERGENCY)
Age: 72
Discharge: HOME | End: 2017-07-31
Attending: EMERGENCY MEDICINE | Admitting: EMERGENCY MEDICINE
Payer: MEDICARE

## 2017-07-31 VITALS
BODY MASS INDEX: 23.1 KG/M2 | OXYGEN SATURATION: 100 % | HEART RATE: 79 BPM | RESPIRATION RATE: 19 BRPM | HEIGHT: 74 IN | SYSTOLIC BLOOD PRESSURE: 130 MMHG | DIASTOLIC BLOOD PRESSURE: 80 MMHG | TEMPERATURE: 98.5 F | WEIGHT: 180 LBS

## 2017-07-31 DIAGNOSIS — R06.00 DYSPNEA, UNSPECIFIED TYPE: Primary | ICD-10-CM

## 2017-07-31 LAB
ALBUMIN SERPL BCP-MCNC: 3.6 G/DL (ref 3.4–5)
ALBUMIN/GLOB SERPL: 1.1 {RATIO} (ref 0.8–1.7)
ALP SERPL-CCNC: 63 U/L (ref 45–117)
ALT SERPL-CCNC: 22 U/L (ref 16–61)
ANION GAP BLD CALC-SCNC: 8 MMOL/L (ref 3–18)
AST SERPL W P-5'-P-CCNC: 22 U/L (ref 15–37)
ATRIAL RATE: 73 BPM
BASOPHILS # BLD AUTO: 0 K/UL (ref 0–0.06)
BASOPHILS # BLD: 1 % (ref 0–2)
BILIRUB SERPL-MCNC: 0.6 MG/DL (ref 0.2–1)
BNP SERPL-MCNC: 276 PG/ML (ref 0–900)
BUN SERPL-MCNC: 13 MG/DL (ref 7–18)
BUN/CREAT SERPL: 13 (ref 12–20)
CALCIUM SERPL-MCNC: 8.7 MG/DL (ref 8.5–10.1)
CALCULATED P AXIS, ECG09: 58 DEGREES
CALCULATED R AXIS, ECG10: 55 DEGREES
CALCULATED T AXIS, ECG11: 21 DEGREES
CHLORIDE SERPL-SCNC: 105 MMOL/L (ref 100–108)
CO2 SERPL-SCNC: 26 MMOL/L (ref 21–32)
CREAT SERPL-MCNC: 1.03 MG/DL (ref 0.6–1.3)
DIAGNOSIS, 93000: NORMAL
DIFFERENTIAL METHOD BLD: NORMAL
EOSINOPHIL # BLD: 0.2 K/UL (ref 0–0.4)
EOSINOPHIL NFR BLD: 3 % (ref 0–5)
ERYTHROCYTE [DISTWIDTH] IN BLOOD BY AUTOMATED COUNT: 14.2 % (ref 11.6–14.5)
GLOBULIN SER CALC-MCNC: 3.4 G/DL (ref 2–4)
GLUCOSE SERPL-MCNC: 92 MG/DL (ref 74–99)
HCT VFR BLD AUTO: 40.6 % (ref 36–48)
HGB BLD-MCNC: 13.7 G/DL (ref 13–16)
LYMPHOCYTES # BLD AUTO: 33 % (ref 21–52)
LYMPHOCYTES # BLD: 1.9 K/UL (ref 0.9–3.6)
MCH RBC QN AUTO: 29 PG (ref 24–34)
MCHC RBC AUTO-ENTMCNC: 33.7 G/DL (ref 31–37)
MCV RBC AUTO: 86 FL (ref 74–97)
MONOCYTES # BLD: 0.5 K/UL (ref 0.05–1.2)
MONOCYTES NFR BLD AUTO: 8 % (ref 3–10)
NEUTS SEG # BLD: 3.2 K/UL (ref 1.8–8)
NEUTS SEG NFR BLD AUTO: 55 % (ref 40–73)
P-R INTERVAL, ECG05: 116 MS
PLATELET # BLD AUTO: 216 K/UL (ref 135–420)
PMV BLD AUTO: 9.4 FL (ref 9.2–11.8)
POTASSIUM SERPL-SCNC: 4.1 MMOL/L (ref 3.5–5.5)
PROT SERPL-MCNC: 7 G/DL (ref 6.4–8.2)
Q-T INTERVAL, ECG07: 410 MS
QRS DURATION, ECG06: 78 MS
QTC CALCULATION (BEZET), ECG08: 451 MS
RBC # BLD AUTO: 4.72 M/UL (ref 4.7–5.5)
SODIUM SERPL-SCNC: 139 MMOL/L (ref 136–145)
VENTRICULAR RATE, ECG03: 73 BPM
WBC # BLD AUTO: 5.7 K/UL (ref 4.6–13.2)

## 2017-07-31 PROCEDURE — 83880 ASSAY OF NATRIURETIC PEPTIDE: CPT | Performed by: EMERGENCY MEDICINE

## 2017-07-31 PROCEDURE — 93005 ELECTROCARDIOGRAM TRACING: CPT

## 2017-07-31 PROCEDURE — 85025 COMPLETE CBC W/AUTO DIFF WBC: CPT | Performed by: EMERGENCY MEDICINE

## 2017-07-31 PROCEDURE — 80053 COMPREHEN METABOLIC PANEL: CPT | Performed by: EMERGENCY MEDICINE

## 2017-07-31 PROCEDURE — 99284 EMERGENCY DEPT VISIT MOD MDM: CPT

## 2017-07-31 PROCEDURE — 71010 XR CHEST SNGL V: CPT

## 2017-07-31 NOTE — DISCHARGE INSTRUCTIONS

## 2017-07-31 NOTE — ED TRIAGE NOTES
Patient ot ED with c/o lower abdominal pain, bilateral knee swelling and shortness of breath x months to years per patient. Ambulatory on arrival. NKDA. Rates pain 4/10.

## 2017-09-05 ENCOUNTER — OFFICE VISIT (OUTPATIENT)
Dept: CARDIOLOGY CLINIC | Age: 72
End: 2017-09-05

## 2017-09-05 VITALS
DIASTOLIC BLOOD PRESSURE: 68 MMHG | BODY MASS INDEX: 23.1 KG/M2 | WEIGHT: 180 LBS | HEART RATE: 65 BPM | HEIGHT: 74 IN | SYSTOLIC BLOOD PRESSURE: 109 MMHG

## 2017-09-05 DIAGNOSIS — I50.32 CHRONIC DIASTOLIC HEART FAILURE (HCC): Primary | ICD-10-CM

## 2017-09-05 DIAGNOSIS — E78.5 DYSLIPIDEMIA: ICD-10-CM

## 2017-09-05 DIAGNOSIS — I25.118 CORONARY ARTERY DISEASE OF NATIVE ARTERY OF NATIVE HEART WITH STABLE ANGINA PECTORIS (HCC): ICD-10-CM

## 2017-09-05 DIAGNOSIS — I51.9 LV DYSFUNCTION: ICD-10-CM

## 2017-09-05 DIAGNOSIS — I73.9 PAD (PERIPHERAL ARTERY DISEASE) (HCC): ICD-10-CM

## 2017-09-05 DIAGNOSIS — I10 BENIGN ESSENTIAL HTN: ICD-10-CM

## 2017-09-05 NOTE — MR AVS SNAPSHOT
Visit Information Date & Time Provider Department Dept. Phone Encounter #  
 9/5/2017 11:30 AM Silvano Lamb MD [facility] [address] [facility] [id number] Follow-up Instructions Return in about 2 months (around 11/5/2017). Follow-up and Disposition History Your Appointments 10/20/2017 12:45 PM  
PROCEDURE with [place] ECHO [facility] [place] ([facility]) Appt Note: tiffany [address], [address] [facility] [address]  
[address], [address] [facility]  
  
    
 11/7/2017  7:45 AM  
ESTABLISHED PATIENT with Silvano Lamb MD  
[facility]) Appt Note: 2 months post echo [address], [address] [facility] [address]  
[address], [address] [facility] [address] [place] [address] Upcoming Health Maintenance Date Due DTaP/Tdap/Td series (1 - Tdap) 5/25/1965 FOBT Q 1 YEAR AGE 50-75 5/25/1994 ZOSTER VACCINE AGE 60> 3/25/2004 GLAUCOMA SCREENING Q2Y 5/25/2009 Pneumococcal 65+ Low/Medium Risk (1 of 2 - PCV13) 5/25/2009 MEDICARE YEARLY EXAM 5/25/2009 INFLUENZA AGE 9 TO ADULT 8/1/2017 Allergies as of 9/5/2017  Review Complete On: 9/5/2017 By: Silvano Lamb MD  
 No Known Allergies Current Immunizations  Never Reviewed No immunizations on file. Not reviewed this visit You Were Diagnosed With   
  
 Codes Comments Chronic diastolic heart failure (HCC)    -  Primary ICD-10-CM: I50.32 
ICD-9-CM: 428.32 Dyslipidemia     ICD-10-CM: E78.5 ICD-9-CM: 272.4 LV dysfunction     ICD-10-CM: I51.9 ICD-9-CM: 429.9 Coronary artery disease of native artery of native heart with stable angina pectoris ([facility] [address]     ICD-10-CM: I25.118 
ICD-9-CM: 414.01, 413.9 Benign essential HTN     ICD-10-CM: I10 
ICD-9-CM: 401.1 PAD (peripheral artery disease) (HCC)     ICD-10-CM: I73.9 ICD-9-CM: 443.9 Vitals BP Pulse Height(growth percentile) Weight(growth percentile) BMI Smoking Status 109/68 65 6' 2\" (1.88 m) 180 lb (81.6 kg) 23.11 kg/m2 Never Smoker BMI and BSA Data Body Mass Index Body Surface Area  
 23.11 kg/m 2 2.06 m 2 Preferred Pharmacy Pharmacy Name Phone [facility] [address], [address] Your Updated Medication List  
  
   
This list is accurate as of: 9/5/17 12:23 PM.  Always use your most recent med list.  
  
  
  
  
 albuterol sulfate 90 mcg/actuation Aepb Take 1 Puff by inhalation every four (4) hours as needed for Cough. ARGININE (L-ARGININE) PO Take  by mouth. aspirin 81 mg chewable tablet Take 325 mg by mouth daily. carvedilol 3.125 mg tablet Commonly known as:  Ayah Finder Take 1 Tab by mouth two (2) times daily (with meals). citalopram 20 mg tablet Commonly known as:  Tildon Buddhism Take 20 mg by mouth daily. Comp. Stocking,Thigh,Reg,Medium Misc  
1 Package by Does Not Apply route daily. furosemide 40 mg tablet Commonly known as:  LASIX Take 1 Tab by mouth two (2) times a day.  
  
 gabapentin 100 mg capsule Commonly known as:  NEURONTIN Take 100 mg by mouth nightly. lisinopril 2.5 mg tablet Commonly known as:  Saumya Husbands Take 1 Tab by mouth daily. meloxicam 15 mg tablet Commonly known as:  MOBIC Take 15 mg by mouth daily. nitroglycerin 0.4 mg SL tablet Commonly known as:  NITROSTAT  
by SubLINGual route every five (5) minutes as needed for Chest Pain. omeprazole 20 mg capsule Commonly known as:  PRILOSEC Take 1 Cap by mouth daily. oxyCODONE-acetaminophen 5-325 mg per tablet Commonly known as:  PERCOCET Take 1 Tab by mouth every four (4) hours as needed for Pain. Max Daily Amount: 6 Tabs. pentoxifylline  mg CR tablet Commonly known as:  TRENTAL Take 1 Tab by mouth three (3) times daily (with meals). simvastatin 20 mg tablet Commonly known as:  ZOCOR Take 0.5 Tabs by mouth nightly. Follow-up Instructions Return in about 2 months (around 11/5/2017). To-Do List   
 10/19/2017 Cardiac Services:  2D ECHO COMPLETE ADULT (TTE) Introducing [facility]! [facility] Ledes[facility] introduces [employer] patient portal. Now you can access parts of your medical record, email your doctor's office, and request medication refills online. 1. In your internet browser, go to [email or web address] 2. Click on the First Time User? Click Here link in the Sign In box. You will see the New Member Sign Up page. 3. Enter your [employer] Access Code exactly as it appears below. You will not need to use this code after youve completed the sign-up process. If you do not sign up before the expiration date, you must request a new code. · [employer] Access Code: [email or web address] Expires: 10/29/2017 10:39 AM 
 
4. Enter the last four digits of your Social Security Number (xxxx) and Date of Birth (mm/dd/yyyy) as indicated and click Submit. You will be taken to the next sign-up page. 5. Create a [employer] ID. This will be your [employer] login ID and cannot be changed, so think of one that is secure and easy to remember. 6. Create a [employer] password. You can change your password at any time. 7. Enter your Password Reset Question and Answer. This can be used at a later time if you forget your password. 8. Enter your e-mail address. You will receive e-mail notification when new information is available in [address] 9. Click Sign Up. You can now view and download portions of your medical record. 10. Click the Download Summary menu link to download a portable copy of your medical information. If you have questions, please visit the Frequently Asked Questions section of the [employer] website.  Remember, [employer] is NOT to be used for urgent needs. For medical emergencies, dial 911. Now available from your iPhone and Android! Please provide this summary of care documentation to your next provider. Your primary care clinician is listed as Marianela Mckeon Ii. If you have any questions after today's visit, please call [phone].

## 2017-09-05 NOTE — PROGRESS NOTES
1. Have you been to the ER, urgent care clinic since your last visit? Hospitalized since your last visit? Yes [facility] breathing Aug 2017     2. Have you seen or consulted any other health care providers outside of the [address] [place] [address] since your last visit? Include any pap smears or colon screening. Not Sure    3. Since your last visit, have you had any of the following symptoms? chest pains, palpitations, shortness of breath, dizziness and swelling in legs/arms. 4.  Have you had any blood work, X-rays or cardiac testing? Yes Aug 2017     5. Where do you normally have your labs drawn? [facility]    6. Do you need any refills today?    No    Patient did not bring list or meds with him

## 2017-09-05 NOTE — PROGRESS NOTES
HISTORY OF PRESENT ILLNESS  Mayra Richards is a 67 y.o. male. Hypertension   The history is provided by the patient. This is a chronic problem. The current episode started more than 1 week ago. Associated symptoms include shortness of breath. Pertinent negatives include no chest pain. CHF   The history is provided by the patient. This is a chronic problem. The current episode started more than 1 week ago. The problem occurs daily. The problem has been gradually improving. Associated symptoms include shortness of breath. Pertinent negatives include no chest pain. Chest Pain (Angina)    The history is provided by the patient. This is a chronic problem. The current episode started more than 1 week ago. The problem has not changed since onset. The problem occurs rarely. The pain is associated with exertion and normal activity. The pain is present in the left side. The pain is at a severity of 3/10. The quality of the pain is described as pressure-like. The pain does not radiate. Associated symptoms include shortness of breath. Pertinent negatives include no diaphoresis, no nausea, no palpitations and no weakness. He has tried rest for the symptoms. Risk factors include smoking/tobacco exposure, hypertension and male gender. His past medical history is significant for HTN and CHF. Procedural history includes echocardiogram and stress thallium. Claudication   The history is provided by the patient. This is a chronic problem. The current episode started more than 1 week ago. The problem occurs daily. The problem has been gradually improving. Associated symptoms include shortness of breath. Pertinent negatives include no chest pain. The symptoms are aggravated by exertion and walking. The symptoms are relieved by rest. He has tried rest for the symptoms. Review of Systems   Constitutional: Negative for chills, diaphoresis and weight loss. HENT: Negative for hearing loss. Eyes: Negative for blurred vision. Respiratory: Positive for shortness of breath. Negative for stridor. Cardiovascular: Negative for chest pain and palpitations. Gastrointestinal: Negative for heartburn and nausea. Musculoskeletal: Negative for myalgias. Neurological: Negative for tingling, tremors, focal weakness, loss of consciousness and weakness. Psychiatric/Behavioral: Negative for depression and suicidal ideas. Family History   Problem Relation Age of Onset    Heart Attack Neg Hx     Stroke Neg Hx        Past Medical History:   Diagnosis Date    Benign essential HTN 2/17/2016    Dyslipidemia 1/24/2017       Past Surgical History:   Procedure Laterality Date    HX CHOLECYSTECTOMY      HX GI      HX HERNIA REPAIR Bilateral        Social History   Substance Use Topics    Smoking status: Never Smoker    Smokeless tobacco: Never Used    Alcohol use No       No Known Allergies    Outpatient Prescriptions Marked as Taking for the 9/5/17 encounter (Office Visit) with Lulu Lang MD   Medication Sig Dispense Refill    simvastatin (ZOCOR) 20 mg tablet Take 0.5 Tabs by mouth nightly. 45 Tab 2    pentoxifylline CR (TRENTAL) 400 mg CR tablet Take 1 Tab by mouth three (3) times daily (with meals). 90 Tab 3    omeprazole (PRILOSEC) 20 mg capsule Take 1 Cap by mouth daily. 30 Cap 4    meloxicam (MOBIC) 15 mg tablet Take 15 mg by mouth daily.  carvedilol (COREG) 3.125 mg tablet Take 1 Tab by mouth two (2) times daily (with meals). 60 Tab 3    aspirin 81 mg chewable tablet Take 325 mg by mouth daily.  furosemide (LASIX) 40 mg tablet Take 1 Tab by mouth two (2) times a day. (Patient taking differently: Take 40 mg by mouth as needed.) 60 Tab 4        Visit Vitals    /68    Pulse 65    Ht 6' 2\" (1.88 m)    Wt 81.6 kg (180 lb)    BMI 23.11 kg/m2     Physical Exam   Constitutional: He is oriented to person, place, and time. He appears well-developed and well-nourished. No distress.    HENT:   Head: Atraumatic. Mouth/Throat: No oropharyngeal exudate. Eyes: Conjunctivae are normal. No scleral icterus. Neck: Neck supple. No JVD present. Cardiovascular: Normal rate and regular rhythm. Exam reveals no gallop. No murmur heard. Pulmonary/Chest: Effort normal and breath sounds normal. No stridor. He has no wheezes. He has no rales. Abdominal: Soft. There is no tenderness. There is no rebound. Musculoskeletal: Normal range of motion. He exhibits edema (improving since last visit). Neurological: He is alert and oriented to person, place, and time. He exhibits normal muscle tone. Skin: Skin is warm. He is not diaphoretic. Psychiatric: He has a normal mood and affect. His behavior is normal.     6/16 Cardiac Cath  FINDINGS:  1. Left main is patent and bifurcates into left anterior descending artery  and circumflex artery. 2. Left anterior descending artery has mild ectasia with moderate to severe  stenosis in the proximal portion. It appears to be a napkin ring type  lesion. By IVUS imaging, we obtained a stenosis of 62% with an area of 3.7  mm2. Mid to distal left anterior descending artery had wall irregularities  with sluggish flow, suggestive of severe microvascular disease. Apical LAD  had wall irregularities. 3. Diagonal 1 and diagonal 2 artery appeared to be small caliber vessel  with wall irregularities. 4. Circumflex artery is codominant with sluggish flow consistent with  microvascular disease. 5. Right coronary artery is codominant with sluggish flow suggestive of  microvascular disease. 6. Left ventricular end-diastolic pressure was 13 mmHg.     CONCLUSION: Single-vessel coronary artery disease.  Left anterior  descending artery stenosis was 62% in the proximal portion by intravascular  ultrasound imaging. Normal left ventricular and diastolic pressure. The  patient is to be on intense medical management and risk factor  modification.     ASSESSMENT and PLAN    ICD-10-CM ICD-9-CM 1. Chronic diastolic heart failure (HCC) I50.32 428.32 2D ECHO COMPLETE ADULT (TTE)   2. Dyslipidemia E78.5 272.4    3. LV dysfunction I51.9 429.9    4. Coronary artery disease of native artery of native heart with stable angina pectoris ([facility] [address] I25.118 414.01      413.9    5. Benign essential HTN I10 401.1    6. PAD (peripheral artery disease) ([facility]) I73.9 443.9      Orders Placed This Encounter    2D ECHO COMPLETE ADULT (TTE)     Standing Status:   Future     Standing Expiration Date:   3/5/2018     Order Specific Question:   Reason for Exam:     Answer:   CHF     Follow-up Disposition:  Return in about 2 months (around 11/5/2017). Patient with LV dysfunction/ abnormal stress test-- had cardiac cath-62% LAD stenosis by IVUS imaging. Continue statin and PPI for GERD. Will reevaluate LV function as he continues to have NYHA class II/III symptoms  Discussed at length regarding the need for medication compliance.

## 2017-11-07 ENCOUNTER — OFFICE VISIT (OUTPATIENT)
Dept: CARDIOLOGY CLINIC | Age: 72
End: 2017-11-07

## 2017-11-07 VITALS
HEIGHT: 74 IN | HEART RATE: 73 BPM | DIASTOLIC BLOOD PRESSURE: 69 MMHG | BODY MASS INDEX: 22.59 KG/M2 | WEIGHT: 176 LBS | SYSTOLIC BLOOD PRESSURE: 108 MMHG

## 2017-11-07 DIAGNOSIS — I10 BENIGN ESSENTIAL HTN: Primary | ICD-10-CM

## 2017-11-07 RX ORDER — MIRABEGRON 25 MG/1
25 TABLET, FILM COATED, EXTENDED RELEASE ORAL DAILY
COMMUNITY
End: 2019-06-04

## 2017-11-07 RX ORDER — CILOSTAZOL 50 MG/1
TABLET ORAL
COMMUNITY
End: 2019-06-04

## 2017-11-07 RX ORDER — MELATONIN
DAILY
COMMUNITY
End: 2019-06-04

## 2017-11-07 NOTE — MR AVS SNAPSHOT
Visit Information Date & Time Provider Department Dept. Phone Encounter #  
 11/7/2017  7:45 AM Thais Mcallister MD [facility] [address] [place] [address] [id number] Follow-up Instructions Return in about 4 months (around 3/7/2018). Your Appointments 11/8/2017 10:00 AM  
PROCEDURE with [place] ECHO [facility] [place] ([address]) Appt Note: tiffany [address], [address] [facility] [address]  
[address], [address] [facility]  
  
    
 3/6/2018  9:30 AM  
ESTABLISHED PATIENT with Thais Mcallister MD  
[facility]) Appt Note: 4 months post echo [address] [facility], [address] [facility] [address]  
[address], [address] [place] [address] Upcoming Health Maintenance Date Due DTaP/Tdap/Td series (1 - Tdap) 5/25/1965 FOBT Q 1 YEAR AGE 50-75 5/25/1994 ZOSTER VACCINE AGE 60> 3/25/2004 GLAUCOMA SCREENING Q2Y 5/25/2009 Pneumococcal 65+ Low/Medium Risk (1 of 2 - PCV13) 5/25/2009 MEDICARE YEARLY EXAM 5/25/2009 INFLUENZA AGE 9 TO ADULT 8/1/2017 Allergies as of 11/7/2017  Review Complete On: 11/7/2017 By: Ashok Romero LPN No Known Allergies Current Immunizations  Never Reviewed No immunizations on file. Not reviewed this visit You Were Diagnosed With   
  
 Codes Comments Benign essential HTN    -  Primary ICD-10-CM: I10 
ICD-9-CM: 401.1 Vitals BP Pulse Height(growth percentile) Weight(growth percentile) BMI Smoking Status 108/69 73 6' 2\" (1.88 m) 176 lb (79.8 kg) 22.6 kg/m2 Never Smoker Vitals History BMI and BSA Data Body Mass Index Body Surface Area  
 22.6 kg/m 2 2.04 m 2 Preferred Pharmacy Pharmacy Name Phone [address] [facility] [address], [address] Your Updated Medication List  
  
   
 This list is accurate as of: 11/7/17  8:44 AM.  Always use your most recent med list.  
  
  
  
  
 albuterol sulfate 90 mcg/actuation Aepb Take 1 Puff by inhalation every four (4) hours as needed for Cough. ARGININE (L-ARGININE) PO Take  by mouth. aspirin 81 mg chewable tablet Take 325 mg by mouth daily. carvedilol 3.125 mg tablet Commonly known as:  Ozie Bitter Take 1 Tab by mouth two (2) times daily (with meals). cilostazol 50 mg tablet Commonly known as:  PLETAL Take  by mouth Before breakfast and dinner. citalopram 20 mg tablet Commonly known as:  Rafiq Gola Take 20 mg by mouth daily. Comp. Stocking,Thigh,Reg,Medium Misc  
1 Package by Does Not Apply route daily. furosemide 40 mg tablet Commonly known as:  LASIX Take 1 Tab by mouth two (2) times a day.  
  
 gabapentin 100 mg capsule Commonly known as:  NEURONTIN Take 100 mg by mouth nightly. lisinopril 2.5 mg tablet Commonly known as:  Marilynne Shows Take 1 Tab by mouth daily. meloxicam 15 mg tablet Commonly known as:  MOBIC Take 15 mg by mouth daily. MYRBETRIQ 25 mg ER tablet Generic drug:  mirabegron ER Take 25 mg by mouth daily. nitroglycerin 0.4 mg SL tablet Commonly known as:  NITROSTAT  
by SubLINGual route every five (5) minutes as needed for Chest Pain. omeprazole 20 mg capsule Commonly known as:  PRILOSEC Take 1 Cap by mouth daily. oxyCODONE-acetaminophen 5-325 mg per tablet Commonly known as:  PERCOCET Take 1 Tab by mouth every four (4) hours as needed for Pain. Max Daily Amount: 6 Tabs. pentoxifylline  mg CR tablet Commonly known as:  TRENTAL Take 1 Tab by mouth three (3) times daily (with meals). simvastatin 20 mg tablet Commonly known as:  ZOCOR Take 0.5 Tabs by mouth nightly. VITAMIN D3 1,000 unit tablet Generic drug:  cholecalciferol Take  by mouth daily. We Performed the Following AMB POC EKG ROUTINE W/ 12 LEADS, INTER & REP [32907 CPT(R)] Follow-up Instructions Return in about 4 months (around 3/7/2018). Introducing [facility]! [facility] introduces [employer] patient portal. Now you can access parts of your medical record, email your doctor's office, and request medication refills online. 1. In your internet browser, go to [email or web address] 2. Click on the First Time User? Click Here link in the Sign In box. You will see the New Member Sign Up page. 3. Enter your [employer] Access Code exactly as it appears below. You will not need to use this code after youve completed the sign-up process. If you do not sign up before the expiration date, you must request a new code. · [employer] Access Code: [email or web address] Expires: 2/5/2018  8:00 AM 
 
4. Enter the last four digits of your Social Security Number (xxxx) and Date of Birth (mm/dd/yyyy) as indicated and click Submit. You will be taken to the next sign-up page. 5. Create a [employer] ID. This will be your [employer] login ID and cannot be changed, so think of one that is secure and easy to remember. 6. Create a [employer] password. You can change your password at any time. 7. Enter your Password Reset Question and Answer. This can be used at a later time if you forget your password. 8. Enter your e-mail address. You will receive e-mail notification when new information is available in [address] 9. Click Sign Up. You can now view and download portions of your medical record. 10. Click the Download Summary menu link to download a portable copy of your medical information. If you have questions, please visit the Frequently Asked Questions section of the [employer] website. Remember, [employer] is NOT to be used for urgent needs. For medical emergencies, dial 911. Now available from your iPhone and Android! Please provide this summary of care documentation to your next provider. Your primary care clinician is listed as Saman Solis Ii. If you have any questions after today's visit, please call [phone].

## 2017-11-07 NOTE — LETTER
Daysi Gay 1945 11/7/2017 Dear Jurgen Camejo MD 
 
I had the pleasure of evaluating  Mr. Daphne Reinoso in office today. Below are the relevant portions of my assessment and plan of care. ICD-10-CM ICD-9-CM 1. Benign essential HTN I10 401.1 AMB POC EKG ROUTINE W/ 12 LEADS, INTER & REP Current Outpatient Prescriptions Medication Sig Dispense Refill  cilostazol (PLETAL) 50 mg tablet Take  by mouth Before breakfast and dinner.  mirabegron ER (MYRBETRIQ) 25 mg ER tablet Take 25 mg by mouth daily.  cholecalciferol (VITAMIN D3) 1,000 unit tablet Take  by mouth daily.  simvastatin (ZOCOR) 20 mg tablet Take 0.5 Tabs by mouth nightly. 45 Tab 2  pentoxifylline CR (TRENTAL) 400 mg CR tablet Take 1 Tab by mouth three (3) times daily (with meals). 90 Tab 3  
 nitroglycerin (NITROSTAT) 0.4 mg SL tablet by SubLINGual route every five (5) minutes as needed for Chest Pain.  aspirin 81 mg chewable tablet Take 325 mg by mouth daily.  furosemide (LASIX) 40 mg tablet Take 1 Tab by mouth two (2) times a day. (Patient taking differently: Take 40 mg by mouth as needed.) 60 Tab 4  
 albuterol sulfate 90 mcg/actuation aepb Take 1 Puff by inhalation every four (4) hours as needed for Cough. 1 Inhaler 0  
 gabapentin (NEURONTIN) 100 mg capsule Take 100 mg by mouth nightly.  ARGININE, L-ARGININE, PO Take  by mouth.  omeprazole (PRILOSEC) 20 mg capsule Take 1 Cap by mouth daily. 30 Cap 4  
 lisinopril (PRINIVIL, ZESTRIL) 2.5 mg tablet Take 1 Tab by mouth daily. 90 Tab 3  
 citalopram (CELEXA) 20 mg tablet Take 20 mg by mouth daily.  meloxicam (MOBIC) 15 mg tablet Take 15 mg by mouth daily.  oxyCODONE-acetaminophen (PERCOCET) 5-325 mg per tablet Take 1 Tab by mouth every four (4) hours as needed for Pain. Max Daily Amount: 6 Tabs. 20 Tab 0  carvedilol (COREG) 3.125 mg tablet Take 1 Tab by mouth two (2) times daily (with meals).  60 Tab 3  
  Comp. Stocking,Thigh,Reg,Medium misc 1 Package by Does Not Apply route daily. 2 Package 0 Orders Placed This Encounter  AMB POC EKG ROUTINE W/ 12 LEADS, INTER & REP Order Specific Question:   Reason for Exam: Answer:   htn  cilostazol (PLETAL) 50 mg tablet Sig: Take  by mouth Before breakfast and dinner.  mirabegron ER (MYRBETRIQ) 25 mg ER tablet Sig: Take 25 mg by mouth daily.  cholecalciferol (VITAMIN D3) 1,000 unit tablet Sig: Take  by mouth daily. If you have questions, please do not hesitate to call me. I look forward to following Mr. Heidi Xiong along with you.  
 
Sincerely, 
Stevan Bosch MD

## 2017-11-07 NOTE — PROGRESS NOTES
HISTORY OF PRESENT ILLNESS  Bradly Francis is a 67 y.o. male. CHF   The history is provided by the patient. This is a chronic problem. The current episode started more than 1 week ago. The problem occurs daily. The problem has been gradually improving. Associated symptoms include shortness of breath. Pertinent negatives include no chest pain. Hypertension   The history is provided by the patient. This is a chronic problem. The current episode started more than 1 week ago. Associated symptoms include shortness of breath. Pertinent negatives include no chest pain. Chest Pain (Angina)    The history is provided by the patient. This is a chronic problem. The current episode started more than 1 week ago. The problem has not changed since onset. The problem occurs rarely. The pain is associated with exertion and normal activity. The pain is present in the left side. The pain is at a severity of 3/10. The quality of the pain is described as pressure-like. The pain does not radiate. Associated symptoms include claudication and shortness of breath. Pertinent negatives include no diaphoresis, no nausea, no palpitations and no weakness. He has tried rest for the symptoms. Risk factors include smoking/tobacco exposure, hypertension and male gender. His past medical history is significant for HTN and CHF. Procedural history includes echocardiogram and stress thallium. Claudication   The history is provided by the patient. This is a chronic problem. The current episode started more than 1 week ago. The problem occurs daily. The problem has been gradually improving. Associated symptoms include shortness of breath. Pertinent negatives include no chest pain. The symptoms are aggravated by exertion and walking. The symptoms are relieved by rest. He has tried rest for the symptoms. Review of Systems   Constitutional: Negative for chills, diaphoresis and weight loss. HENT: Negative for hearing loss.     Eyes: Negative for blurred vision. Respiratory: Positive for shortness of breath. Negative for stridor. Cardiovascular: Positive for claudication. Negative for chest pain and palpitations. Gastrointestinal: Negative for heartburn and nausea. Musculoskeletal: Negative for myalgias. Neurological: Negative for tingling, tremors, focal weakness, loss of consciousness and weakness. Psychiatric/Behavioral: Negative for depression and suicidal ideas. Family History   Problem Relation Age of Onset    Heart Attack Neg Hx     Stroke Neg Hx        Past Medical History:   Diagnosis Date    Benign essential HTN 2/17/2016    Dyslipidemia 1/24/2017       Past Surgical History:   Procedure Laterality Date    HX CHOLECYSTECTOMY      HX GI      HX HERNIA REPAIR Bilateral        Social History   Substance Use Topics    Smoking status: Never Smoker    Smokeless tobacco: Never Used    Alcohol use No       No Known Allergies    Outpatient Prescriptions Marked as Taking for the 11/7/17 encounter (Office Visit) with Clarissa Carranza MD   Medication Sig Dispense Refill    cilostazol (PLETAL) 50 mg tablet Take  by mouth Before breakfast and dinner.  mirabegron ER (MYRBETRIQ) 25 mg ER tablet Take 25 mg by mouth daily.  cholecalciferol (VITAMIN D3) 1,000 unit tablet Take  by mouth daily.  simvastatin (ZOCOR) 20 mg tablet Take 0.5 Tabs by mouth nightly. 45 Tab 2    pentoxifylline CR (TRENTAL) 400 mg CR tablet Take 1 Tab by mouth three (3) times daily (with meals). 90 Tab 3    nitroglycerin (NITROSTAT) 0.4 mg SL tablet by SubLINGual route every five (5) minutes as needed for Chest Pain.  aspirin 81 mg chewable tablet Take 325 mg by mouth daily.  furosemide (LASIX) 40 mg tablet Take 1 Tab by mouth two (2) times a day.  (Patient taking differently: Take 40 mg by mouth as needed.) 60 Tab 4        Visit Vitals    /69    Pulse 73    Ht 6' 2\" (1.88 m)    Wt 79.8 kg (176 lb)    BMI 22.6 kg/m2 Physical Exam   Constitutional: He is oriented to person, place, and time. He appears well-developed and well-nourished. No distress. HENT:   Head: Atraumatic. Mouth/Throat: No oropharyngeal exudate. Eyes: Conjunctivae are normal. No scleral icterus. Neck: Neck supple. No JVD present. Cardiovascular: Normal rate and regular rhythm. Exam reveals no gallop. No murmur heard. Pulmonary/Chest: Effort normal and breath sounds normal. No stridor. He has no wheezes. He has no rales. Abdominal: Soft. There is no tenderness. There is no rebound. Musculoskeletal: Normal range of motion. He exhibits no edema. Neurological: He is alert and oriented to person, place, and time. He exhibits normal muscle tone. Skin: Skin is warm. He is not diaphoretic. Psychiatric: He has a normal mood and affect. His behavior is normal.     6/16 Cardiac Cath  FINDINGS:  1. Left main is patent and bifurcates into left anterior descending artery  and circumflex artery. 2. Left anterior descending artery has mild ectasia with moderate to severe  stenosis in the proximal portion. It appears to be a napkin ring type  lesion. By IVUS imaging, we obtained a stenosis of 62% with an area of 3.7  mm2. Mid to distal left anterior descending artery had wall irregularities  with sluggish flow, suggestive of severe microvascular disease. Apical LAD  had wall irregularities. 3. Diagonal 1 and diagonal 2 artery appeared to be small caliber vessel  with wall irregularities. 4. Circumflex artery is codominant with sluggish flow consistent with  microvascular disease. 5. Right coronary artery is codominant with sluggish flow suggestive of  microvascular disease. 6. Left ventricular end-diastolic pressure was 13 mmHg.     CONCLUSION: Single-vessel coronary artery disease.  Left anterior  descending artery stenosis was 62% in the proximal portion by intravascular  ultrasound imaging. Normal left ventricular and diastolic pressure. The  patient is to be on intense medical management and risk factor  modification. ASSESSMENT and PLAN    ICD-10-CM ICD-9-CM    1. Benign essential HTN I10 401.1 AMB POC EKG ROUTINE W/ 12 LEADS, INTER & REP     Orders Placed This Encounter    AMB POC EKG ROUTINE W/ 12 LEADS, INTER & REP     Order Specific Question:   Reason for Exam:     Answer:   htn     Follow-up Disposition:  Return in about 4 months (around 3/7/2018). Patient with LV dysfunction/ abnormal stress test-- had cardiac cath-62% LAD stenosis by IVUS imaging. Continue statin and PPI for GERD. Will reevaluate LV function as he continues to have NYHA class II/III symptoms  Discussed at length regarding the need for medication compliance.   He is now off coreg- will restart after echo report

## 2017-11-07 NOTE — PROGRESS NOTES
1. Have you been to the ER, urgent care clinic since your last visit? Hospitalized since your last visit?     no    2. Have you seen or consulted any other health care providers outside of the [address] [place] [address] since your last visit? Include any pap smears or colon screening. Yes, pcp    3. Since your last visit, have you had any of the following symptoms? Some days pt has sob, chest pain, dizziness off and on, had some swelling and every now than palpitations        4. Have you had any blood work, X-rays or cardiac testing?     no            5.  Where do you normally have your labs drawn? Pcp/ [facility]    6. Do you need any refills today?    no

## 2017-11-08 ENCOUNTER — CLINICAL SUPPORT (OUTPATIENT)
Dept: CARDIOLOGY CLINIC | Age: 72
End: 2017-11-08

## 2017-11-08 DIAGNOSIS — I50.32 CHRONIC DIASTOLIC HEART FAILURE (HCC): ICD-10-CM

## 2018-02-13 ENCOUNTER — OFFICE VISIT (OUTPATIENT)
Dept: CARDIOLOGY CLINIC | Age: 73
End: 2018-02-13

## 2018-02-13 VITALS
HEART RATE: 69 BPM | SYSTOLIC BLOOD PRESSURE: 109 MMHG | BODY MASS INDEX: 23.23 KG/M2 | WEIGHT: 181 LBS | HEIGHT: 74 IN | DIASTOLIC BLOOD PRESSURE: 73 MMHG

## 2018-02-13 DIAGNOSIS — I73.9 PAD (PERIPHERAL ARTERY DISEASE) (HCC): ICD-10-CM

## 2018-02-13 DIAGNOSIS — I51.9 LV DYSFUNCTION: ICD-10-CM

## 2018-02-13 DIAGNOSIS — I10 BENIGN ESSENTIAL HTN: ICD-10-CM

## 2018-02-13 DIAGNOSIS — E78.5 DYSLIPIDEMIA: ICD-10-CM

## 2018-02-13 DIAGNOSIS — I25.118 CORONARY ARTERY DISEASE OF NATIVE ARTERY OF NATIVE HEART WITH STABLE ANGINA PECTORIS (HCC): ICD-10-CM

## 2018-02-13 DIAGNOSIS — I50.32 CHRONIC DIASTOLIC HEART FAILURE (HCC): Primary | ICD-10-CM

## 2018-02-13 RX ORDER — IBUPROFEN 800 MG/1
800 TABLET ORAL
COMMUNITY
End: 2019-06-04

## 2018-02-13 RX ORDER — CARVEDILOL 3.12 MG/1
3.12 TABLET ORAL 2 TIMES DAILY WITH MEALS
Qty: 60 TAB | Refills: 3 | Status: SHIPPED | OUTPATIENT
Start: 2018-02-13 | End: 2018-11-20 | Stop reason: SDUPTHER

## 2018-02-13 RX ORDER — BACLOFEN 20 MG/1
20 TABLET ORAL
COMMUNITY
End: 2021-09-15

## 2018-02-13 NOTE — PROGRESS NOTES
1. Have you been to the ER, urgent care clinic since your last visit? Hospitalized since your last visit?no     2. Have you seen or consulted any other health care providers outside of the [address] [place] [address] since your last visit? Include any pap smears or colon screening.  PCP      Patient brought meds and states the meds he brought is all he takes       Patient has been having SOB

## 2018-02-13 NOTE — MR AVS SNAPSHOT
[address] [facility] [address] 
 
 
 [address], [address] [facility] [address] 
[phone] Patient: Jerel Shepard MRN: [id number] RCI:[id number]/39/[id number] Visit Information Date & Time Provider Department Dept. Phone Encounter #  
 2/13/2018 10:30 AM Nolene Barthel, MD Cardiology [facility] [address] TUCKER [phone] [id number] Follow-up Instructions Return in about 3 months (around 5/13/2018). Follow-up and Disposition History Your Appointments 3/6/2018  9:30 AM  
ESTABLISHED PATIENT with Nolene Barthel, MD  
[facility]) Appt Note: 4 months post echo [address], [address] [facility] [address] [id number], [address] [facility] [address] Bettie Ray Upcoming Health Maintenance Date Due DTaP/Tdap/Td series (1 - Tdap) 5/25/1965 FOBT Q 1 YEAR AGE 50-75 5/25/1994 ZOSTER VACCINE AGE 60> 3/25/2004 GLAUCOMA SCREENING Q2Y 5/25/2009 Pneumococcal 65+ Low/Medium Risk (1 of 2 - PCV13) 5/25/2009 MEDICARE YEARLY EXAM 5/25/2009 Influenza Age 5 to Adult 8/1/2017 Allergies as of 2/13/2018  Review Complete On: 2/13/2018 By: Danie Ya LPN No Known Allergies Current Immunizations  Never Reviewed No immunizations on file. Not reviewed this visit You Were Diagnosed With   
  
 Codes Comments Chronic diastolic heart failure (HCC)    -  Primary ICD-10-CM: I50.32 
ICD-9-CM: 428.32 Dyslipidemia     ICD-10-CM: E78.5 ICD-9-CM: 272.4 Coronary artery disease of native artery of native heart with stable angina pectoris ([facility] [address]     ICD-10-CM: I25.118 
ICD-9-CM: 414.01, 413.9 Benign essential HTN     ICD-10-CM: I10 
ICD-9-CM: 401.1 PAD (peripheral artery disease) (HCC)     ICD-10-CM: I73.9 ICD-9-CM: 443.9 LV dysfunction     ICD-10-CM: I51.9 ICD-9-CM: 429.9 Vitals  BP Pulse Height(growth percentile) Weight(growth percentile) BMI Smoking Status 109/73 69 6' 2\" (1.88 m) 181 lb (82.1 kg) 23.24 kg/m2 Never Smoker Vitals History BMI and BSA Data Body Mass Index Body Surface Area  
 23.24 kg/m 2 2.07 m 2 [facility] [address] Your Updated Medication List  
  
   
This list is accurate as of: 2/13/18 10:31 AM.  Always use your most recent med list.  
  
  
  
  
 albuterol sulfate 90 mcg/actuation Aepb Take 1 Puff by inhalation every four (4) hours as needed for Cough. ARGININE (L-ARGININE) PO Take  by mouth. aspirin 81 mg chewable tablet Take 325 mg by mouth daily. baclofen 20 mg tablet Commonly known as:  LIORESAL Take 20 mg by mouth two (2) times daily as needed. carvedilol 3.125 mg tablet Commonly known as:  Ruthell Fellers Take 1 Tab by mouth two (2) times daily (with meals). cilostazol 50 mg tablet Commonly known as:  PLETAL Take  by mouth Before breakfast and dinner. citalopram 20 mg tablet Commonly known as:  Drucilla Eng Take 20 mg by mouth daily. Comp. Stocking,Thigh,Reg,Medium Misc  
1 Package by Does Not Apply route daily. furosemide 40 mg tablet Commonly known as:  LASIX Take 1 Tab by mouth two (2) times a day.  
  
 gabapentin 100 mg capsule Commonly known as:  NEURONTIN Take 100 mg by mouth nightly. ibuprofen 800 mg tablet Commonly known as:  MOTRIN Take 800 mg by mouth two (2) times daily as needed for Pain (knee and leg pain). lisinopril 2.5 mg tablet Commonly known as:  Loralie Abarca Take 1 Tab by mouth daily. meloxicam 15 mg tablet Commonly known as:  MOBIC Take 15 mg by mouth daily. MYRBETRIQ 25 mg ER tablet Generic drug:  mirabegron ER Take 25 mg by mouth daily. nitroglycerin 0.4 mg SL tablet Commonly known as:  NITROSTAT by SubLINGual route every five (5) minutes as needed for Chest Pain. omeprazole 20 mg capsule Commonly known as:  PRILOSEC Take 1 Cap by mouth daily. oxyCODONE-acetaminophen 5-325 mg per tablet Commonly known as:  PERCOCET Take 1 Tab by mouth every four (4) hours as needed for Pain. Max Daily Amount: 6 Tabs. pentoxifylline  mg CR tablet Commonly known as:  TRENTAL Take 1 Tab by mouth three (3) times daily (with meals). simvastatin 20 mg tablet Commonly known as:  ZOCOR Take 0.5 Tabs by mouth nightly. VITAMIN D3 1,000 unit tablet Generic drug:  cholecalciferol Take  by mouth daily. Prescriptions Sent to Pharmacy Refills  
 carvedilol (COREG) 3.125 mg tablet 3 Sig: Take 1 Tab by mouth two (2) times daily (with meals). Class: Normal  
 Pharmacy: [address] [place] [address] #: [phone] Route: Oral  
  
Follow-up Instructions Return in about 3 months (around 5/13/2018). Patient Instructions High Blood Pressure: Care Instructions Your Care Instructions If your blood pressure is usually above 140/90, you have high blood pressure, or hypertension. That means the top number is 140 or higher or the bottom number is 90 or higher, or both. Despite what a lot of people think, high blood pressure usually doesn't cause headaches or make you feel dizzy or lightheaded. It usually has no symptoms. But it does increase your risk for heart attack, stroke, and kidney or eye damage. The higher your blood pressure, the more your risk increases. Your doctor will give you a goal for your blood pressure. Your goal will be based on your health and your age. An example of a goal is to keep your blood pressure below 140/90. Lifestyle changes, such as eating healthy and being active, are always important to help lower blood pressure.  You might also take medicine to reach your blood pressure goal. 
Follow-up care is a key part of your treatment and safety. Be sure to make and go to all appointments, and call your doctor if you are having problems. It's also a good idea to know your test results and keep a list of the medicines you take. How can you care for yourself at home? Medical treatment · If you stop taking your medicine, your blood pressure will go back up. You may take one or more types of medicine to lower your blood pressure. Be safe with medicines. Take your medicine exactly as prescribed. Call your doctor if you think you are having a problem with your medicine. · Talk to your doctor before you start taking aspirin every day. Aspirin can help certain people lower their risk of a heart attack or stroke. But taking aspirin isn't right for everyone, because it can cause serious bleeding. · See your doctor regularly. You may need to see the doctor more often at first or until your blood pressure comes down. · If you are taking blood pressure medicine, talk to your doctor before you take decongestants or anti-inflammatory medicine, such as ibuprofen. Some of these medicines can raise blood pressure. · Learn how to check your blood pressure at home. Lifestyle changes · Stay at a healthy weight. This is especially important if you put on weight around the waist. Losing even 10 pounds can help you lower your blood pressure. · If your doctor recommends it, get more exercise. Walking is a good choice. Bit by bit, increase the amount you walk every day. Try for at least 30 minutes on most days of the week. You also may want to swim, bike, or do other activities. · Avoid or limit alcohol. Talk to your doctor about whether you can drink any alcohol. · Try to limit how much sodium you eat to less than 2,300 milligrams (mg) a day. Your doctor may ask you to try to eat less than 1,500 mg a day.  
· Eat plenty of fruits (such as bananas and oranges), vegetables, legumes, whole grains, and low-fat dairy products. · Lower the amount of saturated fat in your diet. Saturated fat is found in animal products such as milk, cheese, and meat. Limiting these foods may help you lose weight and also lower your risk for heart disease. · Do not smoke. Smoking increases your risk for heart attack and stroke. If you need help quitting, talk to your doctor about stop-smoking programs and medicines. These can increase your chances of quitting for good. When should you call for help? Call 911 anytime you think you may need emergency care. This may mean having symptoms that suggest that your blood pressure is causing a serious heart or blood vessel problem. Your blood pressure may be over 180/110. ? For example, call 911 if: 
? · You have symptoms of a heart attack. These may include: ¨ Chest pain or pressure, or a strange feeling in the chest. 
¨ Sweating. ¨ Shortness of breath. ¨ Nausea or vomiting. ¨ Pain, pressure, or a strange feeling in the back, neck, jaw, or upper belly or in one or both shoulders or arms. ¨ Lightheadedness or sudden weakness. ¨ A fast or irregular heartbeat. ? · You have symptoms of a stroke. These may include: 
¨ Sudden numbness, tingling, weakness, or loss of movement in your face, arm, or leg, especially on only one side of your body. ¨ Sudden vision changes. ¨ Sudden trouble speaking. ¨ Sudden confusion or trouble understanding simple statements. ¨ Sudden problems with walking or balance. ¨ A sudden, severe headache that is different from past headaches. ? · You have severe back or belly pain. ?Do not wait until your blood pressure comes down on its own. Get help right away. ?Call your doctor now or seek immediate care if: 
? · Your blood pressure is much higher than normal (such as 180/110 or higher), but you don't have symptoms. ? · You think high blood pressure is causing symptoms, such as: ¨ Severe headache. ¨ Blurry vision. ?Watch closely for changes in your health, and be sure to contact your doctor if: 
? · Your blood pressure measures 140/90 or higher at least 2 times. That means the top number is 140 or higher or the bottom number is 90 or higher, or both. ? · You think you may be having side effects from your blood pressure medicine. ? · Your blood pressure is usually normal, but it goes above normal at least 2 times. Where can you learn more? Go to http://hugo-sherley.info/. Enter N975 in the search box to learn more about \"High Blood Pressure: Care Instructions. \" Current as of: September 21, 2016 Content Version: 11.4 © 8869-2235 [employer]. Care instructions adapted under license by [employer] (which disclaims liability or warranty for this information). If you have questions about a medical condition or this instruction, always ask your healthcare professional. [place] [address] any warranty or liability for your use of this information. Patient Instructions History Introducing [address]! Radha Neff introduces SpotMe patient portal. Now you can access parts of your medical record, email your doctor's office, and request medication refills online. 1. In your internet browser, go to [email or web address] 2. Click on the First Time User? Click Here link in the Sign In box. You will see the New Member Sign Up page. 3. Enter your SpotMe Access Code exactly as it appears below. You will not need to use this code after youve completed the sign-up process. If you do not sign up before the expiration date, you must request a new code. · SpotMe Access Code: [email or web address] Expires: 5/14/2018 10:04 AM 
 
4. Enter the last four digits of your Social Security Number (xxxx) and Date of Birth (mm/dd/yyyy) as indicated and click Submit. You will be taken to the next sign-up page. 5. Create a Miyaobabei ID. This will be your Miyaobabei login ID and cannot be changed, so think of one that is secure and easy to remember. 6. Create a Miyaobabei password. You can change your password at any time. 7. Enter your Password Reset Question and Answer. This can be used at a later time if you forget your password. 8. Enter your e-mail address. You will receive e-mail notification when new information is available in [address] 9. Click Sign Up. You can now view and download portions of your medical record. 10. Click the Download Summary menu link to download a portable copy of your medical information. If you have questions, please visit the Frequently Asked Questions section of the Miyaobabei website. Remember, Miyaobabei is NOT to be used for urgent needs. For medical emergencies, dial 911. Now available from your iPhone and Android! Please provide this summary of care documentation to your next provider. Your primary care clinician is listed as Farhana Izquierdo Ii. If you have any questions after today's visit, please call [phone].

## 2018-02-13 NOTE — PATIENT INSTRUCTIONS
High Blood Pressure: Care Instructions  Your Care Instructions    If your blood pressure is usually above 140/90, you have high blood pressure, or hypertension. That means the top number is 140 or higher or the bottom number is 90 or higher, or both. Despite what a lot of people think, high blood pressure usually doesn't cause headaches or make you feel dizzy or lightheaded. It usually has no symptoms. But it does increase your risk for heart attack, stroke, and kidney or eye damage. The higher your blood pressure, the more your risk increases. Your doctor will give you a goal for your blood pressure. Your goal will be based on your health and your age. An example of a goal is to keep your blood pressure below 140/90. Lifestyle changes, such as eating healthy and being active, are always important to help lower blood pressure. You might also take medicine to reach your blood pressure goal.  Follow-up care is a key part of your treatment and safety. Be sure to make and go to all appointments, and call your doctor if you are having problems. It's also a good idea to know your test results and keep a list of the medicines you take. How can you care for yourself at home? Medical treatment  · If you stop taking your medicine, your blood pressure will go back up. You may take one or more types of medicine to lower your blood pressure. Be safe with medicines. Take your medicine exactly as prescribed. Call your doctor if you think you are having a problem with your medicine. · Talk to your doctor before you start taking aspirin every day. Aspirin can help certain people lower their risk of a heart attack or stroke. But taking aspirin isn't right for everyone, because it can cause serious bleeding. · See your doctor regularly. You may need to see the doctor more often at first or until your blood pressure comes down.   · If you are taking blood pressure medicine, talk to your doctor before you take decongestants or anti-inflammatory medicine, such as ibuprofen. Some of these medicines can raise blood pressure. · Learn how to check your blood pressure at home. Lifestyle changes  · Stay at a healthy weight. This is especially important if you put on weight around the waist. Losing even 10 pounds can help you lower your blood pressure. · If your doctor recommends it, get more exercise. Walking is a good choice. Bit by bit, increase the amount you walk every day. Try for at least 30 minutes on most days of the week. You also may want to swim, bike, or do other activities. · Avoid or limit alcohol. Talk to your doctor about whether you can drink any alcohol. · Try to limit how much sodium you eat to less than 2,300 milligrams (mg) a day. Your doctor may ask you to try to eat less than 1,500 mg a day. · Eat plenty of fruits (such as bananas and oranges), vegetables, legumes, whole grains, and low-fat dairy products. · Lower the amount of saturated fat in your diet. Saturated fat is found in animal products such as milk, cheese, and meat. Limiting these foods may help you lose weight and also lower your risk for heart disease. · Do not smoke. Smoking increases your risk for heart attack and stroke. If you need help quitting, talk to your doctor about stop-smoking programs and medicines. These can increase your chances of quitting for good. When should you call for help? Call 911 anytime you think you may need emergency care. This may mean having symptoms that suggest that your blood pressure is causing a serious heart or blood vessel problem. Your blood pressure may be over 180/110. ? For example, call 911 if:  ? · You have symptoms of a heart attack. These may include:  ¨ Chest pain or pressure, or a strange feeling in the chest.  ¨ Sweating. ¨ Shortness of breath. ¨ Nausea or vomiting.   ¨ Pain, pressure, or a strange feeling in the back, neck, jaw, or upper belly or in one or both shoulders or arms.  ¨ Lightheadedness or sudden weakness. ¨ A fast or irregular heartbeat. ? · You have symptoms of a stroke. These may include:  ¨ Sudden numbness, tingling, weakness, or loss of movement in your face, arm, or leg, especially on only one side of your body. ¨ Sudden vision changes. ¨ Sudden trouble speaking. ¨ Sudden confusion or trouble understanding simple statements. ¨ Sudden problems with walking or balance. ¨ A sudden, severe headache that is different from past headaches. ? · You have severe back or belly pain. ?Do not wait until your blood pressure comes down on its own. Get help right away. ?Call your doctor now or seek immediate care if:  ? · Your blood pressure is much higher than normal (such as 180/110 or higher), but you don't have symptoms. ? · You think high blood pressure is causing symptoms, such as:  ¨ Severe headache. ¨ Blurry vision. ? Watch closely for changes in your health, and be sure to contact your doctor if:  ? · Your blood pressure measures 140/90 or higher at least 2 times. That means the top number is 140 or higher or the bottom number is 90 or higher, or both. ? · You think you may be having side effects from your blood pressure medicine. ? · Your blood pressure is usually normal, but it goes above normal at least 2 times. Where can you learn more? Go to http://hugo-sherley.info/. Enter P512 in the search box to learn more about \"High Blood Pressure: Care Instructions. \"  Current as of: September 21, 2016  Content Version: 11.4  © 9482-1244 [employer]. Care instructions adapted under license by [employer] (which disclaims liability or warranty for this information). If you have questions about a medical condition or this instruction, always ask your healthcare professional. [place] [address] any warranty or liability for your use of this information.

## 2018-02-13 NOTE — PROGRESS NOTES
HISTORY OF PRESENT ILLNESS  Miguelito Mckeon is a 67 y.o. male. CHF   The history is provided by the patient. This is a chronic problem. The current episode started more than 1 week ago. The problem occurs daily. The problem has been gradually improving. Associated symptoms include chest pain and shortness of breath. Chest Pain (Angina)    The history is provided by the patient. This is a chronic problem. The current episode started more than 1 week ago. The problem has not changed since onset. The problem occurs rarely. The pain is associated with exertion and normal activity. The pain is present in the left side. The pain is at a severity of 3/10. The quality of the pain is described as pressure-like. The pain does not radiate. Associated symptoms include shortness of breath. Pertinent negatives include no diaphoresis, no nausea, no palpitations and no weakness. He has tried rest for the symptoms. Risk factors include smoking/tobacco exposure, hypertension and male gender. His past medical history is significant for HTN and CHF. Procedural history includes echocardiogram and stress thallium. Claudication   The history is provided by the patient. This is a chronic problem. The current episode started more than 1 week ago. The problem occurs daily. The problem has been gradually improving. Associated symptoms include chest pain and shortness of breath. The symptoms are aggravated by exertion and walking. The symptoms are relieved by rest. He has tried rest for the symptoms. Review of Systems   Constitutional: Negative for chills, diaphoresis and weight loss. HENT: Negative for hearing loss. Eyes: Negative for blurred vision. Respiratory: Positive for shortness of breath. Negative for stridor. Cardiovascular: Positive for chest pain. Negative for palpitations. Gastrointestinal: Negative for heartburn and nausea. Musculoskeletal: Negative for myalgias.    Neurological: Negative for tingling, tremors, focal weakness, loss of consciousness and weakness. Psychiatric/Behavioral: Negative for depression and suicidal ideas. Family History   Problem Relation Age of Onset    Heart Attack Neg Hx     Stroke Neg Hx        Past Medical History:   Diagnosis Date    Benign essential HTN 2/17/2016    Dyslipidemia 1/24/2017       Past Surgical History:   Procedure Laterality Date    HX CHOLECYSTECTOMY      HX GI      HX HERNIA REPAIR Bilateral        Social History   Substance Use Topics    Smoking status: Never Smoker    Smokeless tobacco: Never Used    Alcohol use No       No Known Allergies    Outpatient Prescriptions Marked as Taking for the 2/13/18 encounter (Office Visit) with Nellie Bolanos MD   Medication Sig Dispense Refill    ibuprofen (MOTRIN) 800 mg tablet Take 800 mg by mouth two (2) times daily as needed for Pain (knee and leg pain).  baclofen (LIORESAL) 20 mg tablet Take 20 mg by mouth two (2) times daily as needed.  carvedilol (COREG) 3.125 mg tablet Take 1 Tab by mouth two (2) times daily (with meals). 60 Tab 3    mirabegron ER (MYRBETRIQ) 25 mg ER tablet Take 25 mg by mouth daily.  simvastatin (ZOCOR) 20 mg tablet Take 0.5 Tabs by mouth nightly. 45 Tab 2    pentoxifylline CR (TRENTAL) 400 mg CR tablet Take 1 Tab by mouth three (3) times daily (with meals). 90 Tab 3    nitroglycerin (NITROSTAT) 0.4 mg SL tablet by SubLINGual route every five (5) minutes as needed for Chest Pain.  aspirin 81 mg chewable tablet Take 325 mg by mouth daily.  furosemide (LASIX) 40 mg tablet Take 1 Tab by mouth two (2) times a day. (Patient taking differently: Take 40 mg by mouth as needed.) 60 Tab 4        Visit Vitals    /73    Pulse 69    Ht 6' 2\" (1.88 m)    Wt 82.1 kg (181 lb)    BMI 23.24 kg/m2     Physical Exam   Constitutional: He is oriented to person, place, and time. He appears well-developed and well-nourished. No distress. HENT:   Head: Atraumatic. Mouth/Throat: No oropharyngeal exudate. Eyes: Conjunctivae are normal. No scleral icterus. Neck: Neck supple. No JVD present. Cardiovascular: Normal rate and regular rhythm. Exam reveals no gallop. No murmur heard. Pulmonary/Chest: Effort normal and breath sounds normal. No stridor. He has no wheezes. He has no rales. Abdominal: Soft. There is no tenderness. There is no rebound. Musculoskeletal: Normal range of motion. He exhibits no edema. Neurological: He is alert and oriented to person, place, and time. He exhibits normal muscle tone. Skin: Skin is warm. He is not diaphoretic. Psychiatric: He has a normal mood and affect. His behavior is normal.     6/16 Cardiac Cath  FINDINGS:  1. Left main is patent and bifurcates into left anterior descending artery  and circumflex artery. 2. Left anterior descending artery has mild ectasia with moderate to severe  stenosis in the proximal portion. It appears to be a napkin ring type  lesion. By IVUS imaging, we obtained a stenosis of 62% with an area of 3.7  mm2. Mid to distal left anterior descending artery had wall irregularities  with sluggish flow, suggestive of severe microvascular disease. Apical LAD  had wall irregularities. 3. Diagonal 1 and diagonal 2 artery appeared to be small caliber vessel  with wall irregularities. 4. Circumflex artery is codominant with sluggish flow consistent with  microvascular disease. 5. Right coronary artery is codominant with sluggish flow suggestive of  microvascular disease. 6. Left ventricular end-diastolic pressure was 13 mmHg.     CONCLUSION: Single-vessel coronary artery disease.  Left anterior  descending artery stenosis was 62% in the proximal portion by intravascular  ultrasound imaging. Normal left ventricular and diastolic pressure. The  patient is to be on intense medical management and risk factor  modification. ASSESSMENT and PLAN    ICD-10-CM ICD-9-CM    1.  Chronic diastolic heart failure ([facility] I50.32 428.32    2. Dyslipidemia E78.5 272.4    3. Coronary artery disease of native artery of native heart with stable angina pectoris ([facility] I25.118 414.01      413.9    4. Benign essential HTN I10 401.1    5. PAD (peripheral artery disease) (HCC) I73.9 443.9    6. LV dysfunction I51.9 429.9      Orders Placed This Encounter    carvedilol (COREG) 3.125 mg tablet     Sig: Take 1 Tab by mouth two (2) times daily (with meals). Dispense:  60 Tab     Refill:  3     Follow-up Disposition:  Return in about 3 months (around 5/13/2018). Patient with LV dysfunction/ abnormal stress test-- had cardiac cath-62% LAD stenosis by IVUS imaging. Continue statin and PPI for GERD.   EF 45%- will resume low dose coreg  C/o mild intermittent chest pain- will reevaluate in 3 months while on BB

## 2018-05-22 ENCOUNTER — OFFICE VISIT (OUTPATIENT)
Dept: CARDIOLOGY CLINIC | Age: 73
End: 2018-05-22

## 2018-05-22 VITALS
DIASTOLIC BLOOD PRESSURE: 81 MMHG | SYSTOLIC BLOOD PRESSURE: 111 MMHG | HEIGHT: 74 IN | HEART RATE: 71 BPM | BODY MASS INDEX: 23.23 KG/M2 | WEIGHT: 181 LBS

## 2018-05-22 DIAGNOSIS — I25.118 CORONARY ARTERY DISEASE OF NATIVE ARTERY OF NATIVE HEART WITH STABLE ANGINA PECTORIS (HCC): Primary | ICD-10-CM

## 2018-05-22 DIAGNOSIS — I50.32 CHRONIC DIASTOLIC HEART FAILURE (HCC): ICD-10-CM

## 2018-05-22 DIAGNOSIS — I10 BENIGN ESSENTIAL HTN: ICD-10-CM

## 2018-05-22 DIAGNOSIS — I51.9 LV DYSFUNCTION: ICD-10-CM

## 2018-05-22 DIAGNOSIS — I73.9 PAD (PERIPHERAL ARTERY DISEASE) (HCC): ICD-10-CM

## 2018-05-22 DIAGNOSIS — E78.5 DYSLIPIDEMIA: ICD-10-CM

## 2018-05-22 NOTE — MR AVS SNAPSHOT
[facility] 
 
 
 [address] [facility], [address] [facility] [address] 
[phone] Patient: Monik Aguilera MRN: [id number] LDE:[birth year] Visit Information Date & Time Provider Department Dept. Phone Encounter #  
 5/22/2018 10:30 AM Allison Wood MD Cardiology [facility] DR BELA TUCKER [phone] [id number] Follow-up Instructions Return in about 6 months (around 11/22/2018). Follow-up and Disposition History Your Appointments 5/22/2018 10:30 AM  
Office Visit with Allison Wood MD  
[facility]) Appt Note: 3 month follow up [address] [facility], [address] [facility] [address] [address] [facility], [address] [place] [address] Upcoming Health Maintenance Date Due DTaP/Tdap/Td series (1 - Tdap) 5/25/1965 FOBT Q 1 YEAR AGE 50-75 5/25/1994 ZOSTER VACCINE AGE 60> 3/25/2004 GLAUCOMA SCREENING Q2Y 5/25/2009 Pneumococcal 65+ Low/Medium Risk (1 of 2 - PCV13) 5/25/2009 MEDICARE YEARLY EXAM 3/14/2018 Influenza Age 5 to Adult 8/1/2018 Allergies as of 5/22/2018  Review Complete On: 5/22/2018 By: Romaine Arriola' No Known Allergies Current Immunizations  Never Reviewed No immunizations on file. Not reviewed this visit You Were Diagnosed With   
  
 Codes Comments Coronary artery disease of native artery of native heart with stable angina pectoris ([facility]    -  Primary ICD-10-CM: I25.118 
ICD-9-CM: 414.01, 413.9 Dyslipidemia     ICD-10-CM: E78.5 ICD-9-CM: 272.4 LV dysfunction     ICD-10-CM: I51.9 ICD-9-CM: 429.9 Benign essential HTN     ICD-10-CM: I10 
ICD-9-CM: 674. 1 Chronic diastolic heart failure (HCC)     ICD-10-CM: I50.32 
ICD-9-CM: 428.32 NYHA class II  
 PAD (peripheral artery disease) (HCC)     ICD-10-CM: I73.9 ICD-9-CM: 443. 9 Vitals BP Pulse Height(growth percentile) Weight(growth percentile) BMI Smoking Status 111/81 71 6' 2\" (1.88 m) 181 lb (82.1 kg) 23.24 kg/m2 Never Smoker Vitals History BMI and BSA Data Body Mass Index Body Surface Area  
 23.24 kg/m 2 2.07 m 2 Your Updated Medication List  
  
   
This list is accurate as of 5/22/18 10:23 AM.  Always use your most recent med list.  
  
  
  
  
 albuterol sulfate 90 mcg/actuation Aepb Take 1 Puff by inhalation every four (4) hours as needed for Cough. ARGININE (L-ARGININE) PO Take  by mouth. aspirin 81 mg chewable tablet Take 325 mg by mouth daily. baclofen 20 mg tablet Commonly known as:  LIORESAL Take 20 mg by mouth two (2) times daily as needed. carvedilol 3.125 mg tablet Commonly known as:  Doroteo Kelch Take 1 Tab by mouth two (2) times daily (with meals). cilostazol 50 mg tablet Commonly known as:  PLETAL Take  by mouth Before breakfast and dinner. citalopram 20 mg tablet Commonly known as:  Ginna Penning Take 20 mg by mouth daily. Comp. Stocking,Thigh,Reg,Medium Misc  
1 Package by Does Not Apply route daily. furosemide 40 mg tablet Commonly known as:  LASIX Take 1 Tab by mouth two (2) times a day.  
  
 gabapentin 100 mg capsule Commonly known as:  NEURONTIN Take 100 mg by mouth nightly. ibuprofen 800 mg tablet Commonly known as:  MOTRIN Take 800 mg by mouth two (2) times daily as needed for Pain (knee and leg pain). lisinopril 2.5 mg tablet Commonly known as:  Helon Estrin Take 1 Tab by mouth daily. meloxicam 15 mg tablet Commonly known as:  MOBIC Take 15 mg by mouth daily. MYRBETRIQ 25 mg ER tablet Generic drug:  mirabegron ER Take 25 mg by mouth daily. nitroglycerin 0.4 mg SL tablet Commonly known as:  NITROSTAT  
by SubLINGual route every five (5) minutes as needed for Chest Pain. omeprazole 20 mg capsule Commonly known as:  PRILOSEC Take 1 Cap by mouth daily. oxyCODONE-acetaminophen 5-325 mg per tablet Commonly known as:  PERCOCET Take 1 Tab by mouth every four (4) hours as needed for Pain. Max Daily Amount: 6 Tabs. pentoxifylline  mg CR tablet Commonly known as:  TRENTAL Take 1 Tab by mouth three (3) times daily (with meals). simvastatin 20 mg tablet Commonly known as:  ZOCOR Take 0.5 Tabs by mouth nightly. VITAMIN D3 1,000 unit tablet Generic drug:  cholecalciferol Take  by mouth daily. Follow-up Instructions Return in about 6 months (around 11/22/2018). Introducing [facility]! Albino Goodman introduces [employer] patient portal. Now you can access parts of your medical record, email your doctor's office, and request medication refills online. 1. In your internet browser, go to [email or web address] 2. Click on the First Time User? Click Here link in the Sign In box. You will see the New Member Sign Up page. 3. Enter your [employer] Access Code exactly as it appears below. You will not need to use this code after youve completed the sign-up process. If you do not sign up before the expiration date, you must request a new code. · [employer] Access Code: [email or web address] Expires: 8/20/2018  9:54 AM 
 
4. Enter the last four digits of your Social Security Number (xxxx) and Date of Birth (mm/dd/yyyy) as indicated and click Submit. You will be taken to the next sign-up page. 5. Create a [employer]it ID. This will be your [employer] login ID and cannot be changed, so think of one that is secure and easy to remember. 6. Create a [employer] password. You can change your password at any time. 7. Enter your Password Reset Question and Answer. This can be used at a later time if you forget your password. 8. Enter your e-mail address. You will receive e-mail notification when new information is available in [address] 9. Click Sign Up. You can now view and download portions of your medical record. 10. Click the Download Summary menu link to download a portable copy of your medical information. If you have questions, please visit the Frequently Asked Questions section of the [employer] website. Remember, [employer] is NOT to be used for urgent needs. For medical emergencies, dial 911. Now available from your iPhone and Android! Please provide this summary of care documentation to your next provider. Your primary care clinician is listed as Tessy Corona Ii. If you have any questions after today's visit, please call [phone].

## 2018-05-22 NOTE — PROGRESS NOTES
1. Have you been to the ER, urgent care clinic since your last visit? Hospitalized since your last visit? No    2. Have you seen or consulted any other health care providers outside of the [facility] since your last visit? Include any pap smears or colon screening.  Yes Where: PCP Reason for visit: Routine

## 2018-05-22 NOTE — PROGRESS NOTES
HISTORY OF PRESENT ILLNESS  Shiva Ortiz is a 67 y.o. male. CHF   The history is provided by the patient. This is a chronic problem. The current episode started more than 1 week ago. The problem occurs daily. The problem has been gradually improving. Associated symptoms include shortness of breath. Pertinent negatives include no chest pain. Claudication   The history is provided by the patient. This is a chronic problem. The current episode started more than 1 week ago. The problem occurs daily. The problem has been gradually improving. Associated symptoms include shortness of breath. Pertinent negatives include no chest pain. The symptoms are aggravated by exertion and walking. The symptoms are relieved by rest. He has tried rest for the symptoms. Review of Systems   Constitutional: Negative for chills and weight loss. HENT: Negative for hearing loss. Eyes: Negative for blurred vision. Respiratory: Positive for shortness of breath. Negative for stridor. Cardiovascular: Negative for chest pain. Gastrointestinal: Negative for heartburn. Musculoskeletal: Negative for myalgias. Neurological: Negative for tingling, tremors, focal weakness and loss of consciousness. Psychiatric/Behavioral: Negative for depression and suicidal ideas.      Family History   Problem Relation Age of Onset    Heart Attack Neg Hx     Stroke Neg Hx        Past Medical History:   Diagnosis Date    Benign essential HTN 2/17/2016    Dyslipidemia 1/24/2017       Past Surgical History:   Procedure Laterality Date    HX CHOLECYSTECTOMY      HX GI      HX HERNIA REPAIR Bilateral        Social History   Substance Use Topics    Smoking status: Never Smoker    Smokeless tobacco: Never Used    Alcohol use No       No Known Allergies    Outpatient Prescriptions Marked as Taking for the 5/22/18 encounter (Office Visit) with Tahir Downs MD   Medication Sig Dispense Refill    ibuprofen (MOTRIN) 800 mg tablet Take 800 mg by mouth two (2) times daily as needed for Pain (knee and leg pain).  baclofen (LIORESAL) 20 mg tablet Take 20 mg by mouth two (2) times daily as needed.  carvedilol (COREG) 3.125 mg tablet Take 1 Tab by mouth two (2) times daily (with meals). 60 Tab 3    cilostazol (PLETAL) 50 mg tablet Take  by mouth Before breakfast and dinner.  mirabegron ER (MYRBETRIQ) 25 mg ER tablet Take 25 mg by mouth daily.  cholecalciferol (VITAMIN D3) 1,000 unit tablet Take  by mouth daily.  simvastatin (ZOCOR) 20 mg tablet Take 0.5 Tabs by mouth nightly. 45 Tab 2    pentoxifylline CR (TRENTAL) 400 mg CR tablet Take 1 Tab by mouth three (3) times daily (with meals). 90 Tab 3    nitroglycerin (NITROSTAT) 0.4 mg SL tablet by SubLINGual route every five (5) minutes as needed for Chest Pain.  meloxicam (MOBIC) 15 mg tablet Take 15 mg by mouth daily.  aspirin 81 mg chewable tablet Take 325 mg by mouth daily.  furosemide (LASIX) 40 mg tablet Take 1 Tab by mouth two (2) times a day. (Patient taking differently: Take 40 mg by mouth as needed.) 60 Tab 4        Visit Vitals    /81    Pulse 71    Ht 6' 2\" (1.88 m)    Wt 82.1 kg (181 lb)    BMI 23.24 kg/m2     Physical Exam   Constitutional: He is oriented to person, place, and time. He appears well-developed and well-nourished. No distress. HENT:   Head: Atraumatic. Mouth/Throat: No oropharyngeal exudate. Eyes: Conjunctivae are normal. No scleral icterus. Neck: Neck supple. No JVD present. Cardiovascular: Normal rate and regular rhythm. Exam reveals no gallop. No murmur heard. Pulmonary/Chest: Effort normal and breath sounds normal. No stridor. He has no wheezes. He has no rales. Abdominal: Soft. There is no tenderness. There is no rebound. Musculoskeletal: Normal range of motion. He exhibits no edema. Neurological: He is alert and oriented to person, place, and time. He exhibits normal muscle tone.    Skin: Skin is warm. He is not diaphoretic. Psychiatric: He has a normal mood and affect. His behavior is normal.     6/16 Cardiac Cath  FINDINGS:  1. Left main is patent and bifurcates into left anterior descending artery  and circumflex artery. 2. Left anterior descending artery has mild ectasia with moderate to severe  stenosis in the proximal portion. It appears to be a napkin ring type  lesion. By IVUS imaging, we obtained a stenosis of 62% with an area of 3.7  mm2. Mid to distal left anterior descending artery had wall irregularities  with sluggish flow, suggestive of severe microvascular disease. Apical LAD  had wall irregularities. 3. Diagonal 1 and diagonal 2 artery appeared to be small caliber vessel  with wall irregularities. 4. Circumflex artery is codominant with sluggish flow consistent with  microvascular disease. 5. Right coronary artery is codominant with sluggish flow suggestive of  microvascular disease. 6. Left ventricular end-diastolic pressure was 13 mmHg.     CONCLUSION: Single-vessel coronary artery disease.  Left anterior  descending artery stenosis was 62% in the proximal portion by intravascular  ultrasound imaging. Normal left ventricular and diastolic pressure. The  patient is to be on intense medical management and risk factor  modification. ASSESSMENT and PLAN    ICD-10-CM ICD-9-CM    1. Coronary artery disease of native artery of native heart with stable angina pectoris ([facility] [address] I25.118 414.01      413.9    2. Dyslipidemia E78.5 272.4    3. LV dysfunction I51.9 429.9    4. Benign essential HTN I10 401.1    5. Chronic diastolic heart failure (HCC) I50.32 428.32     NYHA class II   6. PAD (peripheral artery disease) ([facility]) I73.9 443.9      No orders of the defined types were placed in this encounter. Follow-up Disposition:  Return in about 6 months (around 11/22/2018). Patient with LV dysfunction/ abnormal stress test-- had cardiac cath-62% LAD stenosis by IVUS imaging.   Continue statin and PPI for GERD. EF 45%- on low dose coreg  Continue intense risk factor modification.

## 2018-09-19 ENCOUNTER — HOSPITAL ENCOUNTER (EMERGENCY)
Age: 73
Discharge: HOME | End: 2018-09-19
Attending: EMERGENCY MEDICINE
Payer: MEDICARE

## 2018-09-19 VITALS
TEMPERATURE: 97.7 F | HEIGHT: 74 IN | WEIGHT: 182 LBS | OXYGEN SATURATION: 100 % | DIASTOLIC BLOOD PRESSURE: 64 MMHG | SYSTOLIC BLOOD PRESSURE: 108 MMHG | HEART RATE: 60 BPM | BODY MASS INDEX: 23.36 KG/M2 | RESPIRATION RATE: 13 BRPM

## 2018-09-19 DIAGNOSIS — M25.471 RIGHT ANKLE SWELLING: ICD-10-CM

## 2018-09-19 DIAGNOSIS — R35.0 URINARY FREQUENCY: ICD-10-CM

## 2018-09-19 DIAGNOSIS — M25.469 KNEE SWELLING: Primary | ICD-10-CM

## 2018-09-19 LAB
ALBUMIN SERPL-MCNC: 3.3 G/DL (ref 3.4–5)
ALBUMIN/GLOB SERPL: 0.9 {RATIO} (ref 0.8–1.7)
ALP SERPL-CCNC: 52 U/L (ref 45–117)
ALT SERPL-CCNC: 22 U/L (ref 16–61)
ANION GAP SERPL CALC-SCNC: 7 MMOL/L (ref 3–18)
APPEARANCE UR: CLEAR
AST SERPL-CCNC: 17 U/L (ref 15–37)
BASOPHILS # BLD: 0 K/UL (ref 0–0.1)
BASOPHILS NFR BLD: 0 % (ref 0–2)
BILIRUB SERPL-MCNC: 0.6 MG/DL (ref 0.2–1)
BILIRUB UR QL: NEGATIVE
BUN SERPL-MCNC: 15 MG/DL (ref 7–18)
BUN/CREAT SERPL: 13 (ref 12–20)
CALCIUM SERPL-MCNC: 8.7 MG/DL (ref 8.5–10.1)
CHLORIDE SERPL-SCNC: 106 MMOL/L (ref 100–108)
CO2 SERPL-SCNC: 29 MMOL/L (ref 21–32)
COLOR UR: YELLOW
CREAT SERPL-MCNC: 1.16 MG/DL (ref 0.6–1.3)
DIFFERENTIAL METHOD BLD: ABNORMAL
EOSINOPHIL # BLD: 0.2 K/UL (ref 0–0.4)
EOSINOPHIL NFR BLD: 5 % (ref 0–5)
ERYTHROCYTE [DISTWIDTH] IN BLOOD BY AUTOMATED COUNT: 14 % (ref 11.6–14.5)
GLOBULIN SER CALC-MCNC: 3.5 G/DL (ref 2–4)
GLUCOSE SERPL-MCNC: 81 MG/DL (ref 74–99)
GLUCOSE UR STRIP.AUTO-MCNC: NEGATIVE MG/DL
HCT VFR BLD AUTO: 39.3 % (ref 36–48)
HGB BLD-MCNC: 13.5 G/DL (ref 13–16)
HGB UR QL STRIP: NEGATIVE
KETONES UR QL STRIP.AUTO: NEGATIVE MG/DL
LEUKOCYTE ESTERASE UR QL STRIP.AUTO: NEGATIVE
LYMPHOCYTES # BLD: 2 K/UL (ref 0.9–3.6)
LYMPHOCYTES NFR BLD: 42 % (ref 21–52)
MCH RBC QN AUTO: 29.4 PG (ref 24–34)
MCHC RBC AUTO-ENTMCNC: 34.4 G/DL (ref 31–37)
MCV RBC AUTO: 85.6 FL (ref 74–97)
MONOCYTES # BLD: 0.3 K/UL (ref 0.05–1.2)
MONOCYTES NFR BLD: 7 % (ref 3–10)
NEUTS SEG # BLD: 2.2 K/UL (ref 1.8–8)
NEUTS SEG NFR BLD: 46 % (ref 40–73)
NITRITE UR QL STRIP.AUTO: NEGATIVE
PH UR STRIP: 6.5 [PH] (ref 5–8)
PLATELET # BLD AUTO: 220 K/UL (ref 135–420)
PMV BLD AUTO: 8.8 FL (ref 9.2–11.8)
POTASSIUM SERPL-SCNC: 4.1 MMOL/L (ref 3.5–5.5)
PROT SERPL-MCNC: 6.8 G/DL (ref 6.4–8.2)
PROT UR STRIP-MCNC: NEGATIVE MG/DL
RBC # BLD AUTO: 4.59 M/UL (ref 4.7–5.5)
SODIUM SERPL-SCNC: 142 MMOL/L (ref 136–145)
SP GR UR REFRACTOMETRY: 1.02 (ref 1–1.03)
UROBILINOGEN UR QL STRIP.AUTO: 1 EU/DL (ref 0.2–1)
WBC # BLD AUTO: 4.8 K/UL (ref 4.6–13.2)

## 2018-09-19 PROCEDURE — 74011250637 HC RX REV CODE- 250/637: Performed by: PHYSICIAN ASSISTANT

## 2018-09-19 PROCEDURE — 99284 EMERGENCY DEPT VISIT MOD MDM: CPT

## 2018-09-19 PROCEDURE — 87491 CHLMYD TRACH DNA AMP PROBE: CPT | Performed by: PHYSICIAN ASSISTANT

## 2018-09-19 PROCEDURE — 80053 COMPREHEN METABOLIC PANEL: CPT | Performed by: PHYSICIAN ASSISTANT

## 2018-09-19 PROCEDURE — 81003 URINALYSIS AUTO W/O SCOPE: CPT | Performed by: PHYSICIAN ASSISTANT

## 2018-09-19 PROCEDURE — 85025 COMPLETE CBC W/AUTO DIFF WBC: CPT | Performed by: PHYSICIAN ASSISTANT

## 2018-09-19 RX ORDER — ACETAMINOPHEN 325 MG/1
975 TABLET ORAL
Status: COMPLETED | OUTPATIENT
Start: 2018-09-19 | End: 2018-09-19

## 2018-09-19 RX ORDER — ACETAMINOPHEN 325 MG/1
650 TABLET ORAL
Qty: 20 TAB | Refills: 0 | Status: SHIPPED | OUTPATIENT
Start: 2018-09-19 | End: 2019-06-04

## 2018-09-19 RX ADMIN — ACETAMINOPHEN 975 MG: 325 TABLET ORAL at 12:16

## 2018-09-19 NOTE — ED PROVIDER NOTES
EMERGENCY DEPARTMENT HISTORY AND PHYSICAL EXAM 
 
Date: 9/19/2018 Patient Name: Rebecca Loving History of Presenting Illness Chief Complaint Patient presents with  Ankle swelling History Provided By: Patient Chief Complaint: urinary pain and knee, ankle pain. Duration: Urinary pain for 1 month, knee ankle pain chronic >2 years Timing:  Gradual 
Location: bilateral legs and pelvis. Quality: Aching Severity: Moderate Modifying Factors: rest 
Associated Symptoms: urinary frequency, bilateral knee swelling. Left ankle swelling. Additional History (Context): Rebecca Loving is a 75 y.o. male with Peripheral edema, HTN, heart failure, PAD, CAD, Dyslipidemia, Claudication who presents with LUTS, patient reports urinary frequency, pain on urination, and has noted discharge. Patient reports pelvic pain that comes and goes. Patient c/o knee pain due to chronic swelling for more than two years, patient is being treated by primary doctor. Patient also complaining of left ankle swelling. Stated \"everything hurts\". PCP: Alexander De Los Santos MD 
 
Current Outpatient Prescriptions Medication Sig Dispense Refill  acetaminophen (TYLENOL) 325 mg tablet Take 2 Tabs by mouth every four (4) hours as needed for Pain. 20 Tab 0  ibuprofen (MOTRIN) 800 mg tablet Take 800 mg by mouth two (2) times daily as needed for Pain (knee and leg pain).  baclofen (LIORESAL) 20 mg tablet Take 20 mg by mouth two (2) times daily as needed.  carvedilol (COREG) 3.125 mg tablet Take 1 Tab by mouth two (2) times daily (with meals). 60 Tab 3  
 cilostazol (PLETAL) 50 mg tablet Take  by mouth Before breakfast and dinner.  mirabegron ER (MYRBETRIQ) 25 mg ER tablet Take 25 mg by mouth daily.  cholecalciferol (VITAMIN D3) 1,000 unit tablet Take  by mouth daily.  simvastatin (ZOCOR) 20 mg tablet Take 0.5 Tabs by mouth nightly.  45 Tab 2  
  albuterol sulfate 90 mcg/actuation aepb Take 1 Puff by inhalation every four (4) hours as needed for Cough. 1 Inhaler 0  
 pentoxifylline CR (TRENTAL) 400 mg CR tablet Take 1 Tab by mouth three (3) times daily (with meals). 90 Tab 3  
 gabapentin (NEURONTIN) 100 mg capsule Take 100 mg by mouth nightly.  nitroglycerin (NITROSTAT) 0.4 mg SL tablet by SubLINGual route every five (5) minutes as needed for Chest Pain.  ARGININE, L-ARGININE, PO Take  by mouth.  omeprazole (PRILOSEC) 20 mg capsule Take 1 Cap by mouth daily. 30 Cap 4  
 lisinopril (PRINIVIL, ZESTRIL) 2.5 mg tablet Take 1 Tab by mouth daily. 90 Tab 3  
 citalopram (CELEXA) 20 mg tablet Take 20 mg by mouth daily.  meloxicam (MOBIC) 15 mg tablet Take 15 mg by mouth daily.  aspirin 81 mg chewable tablet Take 325 mg by mouth daily.  furosemide (LASIX) 40 mg tablet Take 1 Tab by mouth two (2) times a day. (Patient taking differently: Take 40 mg by mouth as needed.) 60 Tab 4  Comp. Stocking,Thigh,Reg,Medium misc 1 Package by Does Not Apply route daily. 2 Package 0 Past History Past Medical History: 
Past Medical History:  
Diagnosis Date  Benign essential HTN 2/17/2016  Dyslipidemia 1/24/2017 Past Surgical History: 
Past Surgical History:  
Procedure Laterality Date  HX CHOLECYSTECTOMY  HX GI    
 HX HERNIA REPAIR Bilateral   
 
 
Family History: 
Family History Problem Relation Age of Onset  Heart Attack Neg Hx  Stroke Neg Hx Social History: 
Social History Substance Use Topics  Smoking status: Never Smoker  Smokeless tobacco: Never Used  Alcohol use No  
 
 
Allergies: 
No Known Allergies Review of Systems Review of Systems Constitutional: Negative for chills, diaphoresis, fatigue and fever. HENT: Negative for congestion and dental problem. Respiratory: Negative. Negative for cough, chest tightness and shortness of breath. Cardiovascular: Positive for leg swelling. Negative for chest pain and palpitations. Gastrointestinal: Negative for abdominal distention, abdominal pain and nausea. Genitourinary: Positive for discharge, frequency and urgency. Negative for dysuria, flank pain, genital sores, penile pain, penile swelling, scrotal swelling and testicular pain. Musculoskeletal: Positive for arthralgias and joint swelling. Negative for back pain and gait problem. Skin: Negative for wound. Neurological: Negative for dizziness, light-headedness and headaches. All other systems reviewed and are negative. All Other Systems Negative Physical Exam  
 
Vitals:  
 09/19/18 2981 BP: 126/79 Pulse: 78 Resp: 16 Temp: 97.7 °F (36.5 °C) SpO2: 97% Weight: 82.6 kg (182 lb) Height: 6' 2\" (1.88 m) Physical Exam  
Constitutional: He is oriented to person, place, and time. He appears well-developed. No distress. HENT:  
Head: Normocephalic. Eyes: Pupils are equal, round, and reactive to light. Cardiovascular: Normal rate. Pulmonary/Chest: Effort normal. No respiratory distress. Abdominal: Soft. Bowel sounds are normal. There is no tenderness. Hernia confirmed negative in the right inguinal area and confirmed negative in the left inguinal area. Genitourinary: Testes normal and penis normal. Right testis shows no mass, no swelling and no tenderness. Left testis shows no mass, no swelling and no tenderness. No discharge found. Genitourinary Comments: No discharge noted on exam.   
Musculoskeletal:  
     Right knee: He exhibits swelling. He exhibits normal range of motion, no effusion, no LCL laxity and no MCL laxity. No tenderness found. Left knee: He exhibits swelling. He exhibits normal range of motion, no LCL laxity and no bony tenderness. No tenderness found. Left ankle: He exhibits swelling. He exhibits normal range of motion. No tenderness. Swollen bilateral knees. Noted fluid in Right knee. Left ankle swelling, pitting edema. Neurological: He is alert and oriented to person, place, and time. Skin: Skin is warm and dry. Psychiatric: He has a normal mood and affect. His behavior is normal.  
Vitals reviewed. Diagnostic Study Results Labs - Recent Results (from the past 12 hour(s)) URINALYSIS W/ RFLX MICROSCOPIC Collection Time: 09/19/18  9:52 AM  
Result Value Ref Range Color YELLOW Appearance CLEAR Specific gravity 1.021 1.005 - 1.030    
 pH (UA) 6.5 5.0 - 8.0 Protein NEGATIVE  NEG mg/dL Glucose NEGATIVE  NEG mg/dL Ketone NEGATIVE  NEG mg/dL Bilirubin NEGATIVE  NEG Blood NEGATIVE  NEG Urobilinogen 1.0 0.2 - 1.0 EU/dL Nitrites NEGATIVE  NEG Leukocyte Esterase NEGATIVE  NEG    
CBC WITH AUTOMATED DIFF Collection Time: 09/19/18 11:18 AM  
Result Value Ref Range WBC 4.8 4.6 - 13.2 K/uL  
 RBC 4.59 (L) 4.70 - 5.50 M/uL  
 HGB 13.5 13.0 - 16.0 g/dL HCT 39.3 36.0 - 48.0 % MCV 85.6 74.0 - 97.0 FL  
 MCH 29.4 24.0 - 34.0 PG  
 MCHC 34.4 31.0 - 37.0 g/dL  
 RDW 14.0 11.6 - 14.5 % PLATELET 755 022 - 543 K/uL MPV 8.8 (L) 9.2 - 11.8 FL  
 NEUTROPHILS 46 40 - 73 % LYMPHOCYTES 42 21 - 52 % MONOCYTES 7 3 - 10 % EOSINOPHILS 5 0 - 5 % BASOPHILS 0 0 - 2 %  
 ABS. NEUTROPHILS 2.2 1.8 - 8.0 K/UL  
 ABS. LYMPHOCYTES 2.0 0.9 - 3.6 K/UL  
 ABS. MONOCYTES 0.3 0.05 - 1.2 K/UL  
 ABS. EOSINOPHILS 0.2 0.0 - 0.4 K/UL  
 ABS. BASOPHILS 0.0 0.0 - 0.1 K/UL  
 DF AUTOMATED METABOLIC PANEL, COMPREHENSIVE Collection Time: 09/19/18 11:18 AM  
Result Value Ref Range Sodium 142 136 - 145 mmol/L Potassium 4.1 3.5 - 5.5 mmol/L Chloride 106 100 - 108 mmol/L  
 CO2 29 21 - 32 mmol/L Anion gap 7 3.0 - 18 mmol/L Glucose 81 74 - 99 mg/dL BUN 15 7.0 - 18 MG/DL  Creatinine 1.16 0.6 - 1.3 MG/DL  
 BUN/Creatinine ratio 13 12 - 20    
 GFR est AA >60 >60 ml/min/1.73m2 GFR est non-AA >60 >60 ml/min/1.73m2 Calcium 8.7 8.5 - 10.1 MG/DL Bilirubin, total 0.6 0.2 - 1.0 MG/DL  
 ALT (SGPT) 22 16 - 61 U/L  
 AST (SGOT) 17 15 - 37 U/L Alk. phosphatase 52 45 - 117 U/L Protein, total 6.8 6.4 - 8.2 g/dL Albumin 3.3 (L) 3.4 - 5.0 g/dL Globulin 3.5 2.0 - 4.0 g/dL A-G Ratio 0.9 0.8 - 1.7 Radiologic Studies - No orders to display CT Results  (Last 48 hours) None CXR Results  (Last 48 hours) None Medical Decision Making I am the first provider for this patient. I reviewed the vital signs, available nursing notes, past medical history, past surgical history, family history and social history. Vital Signs-Reviewed the patient's vital signs. Pulse Oximetry Analysis - 97% on RA Records Reviewed: Old Medical Records Procedures: 
Procedures Provider Notes (Medical Decision Making): PT states he is being followed by PCP for chronic knee and ankle pain and swelling. HE also states he has spoken to PCP about urinary frequency and was treated with 'a medication.' Labwork in ED falls WNL . Discussed treatment for STD and pt states he prefers to wait until results are back from lab. Will discharge home at this time MED RECONCILIATION: 
No current facility-administered medications for this encounter. Current Outpatient Prescriptions Medication Sig  
 acetaminophen (TYLENOL) 325 mg tablet Take 2 Tabs by mouth every four (4) hours as needed for Pain.  ibuprofen (MOTRIN) 800 mg tablet Take 800 mg by mouth two (2) times daily as needed for Pain (knee and leg pain).  baclofen (LIORESAL) 20 mg tablet Take 20 mg by mouth two (2) times daily as needed.  carvedilol (COREG) 3.125 mg tablet Take 1 Tab by mouth two (2) times daily (with meals).  cilostazol (PLETAL) 50 mg tablet Take  by mouth Before breakfast and dinner.  mirabegron ER (MYRBETRIQ) 25 mg ER tablet Take 25 mg by mouth daily.  cholecalciferol (VITAMIN D3) 1,000 unit tablet Take  by mouth daily.  simvastatin (ZOCOR) 20 mg tablet Take 0.5 Tabs by mouth nightly.  albuterol sulfate 90 mcg/actuation aepb Take 1 Puff by inhalation every four (4) hours as needed for Cough.  pentoxifylline CR (TRENTAL) 400 mg CR tablet Take 1 Tab by mouth three (3) times daily (with meals).  gabapentin (NEURONTIN) 100 mg capsule Take 100 mg by mouth nightly.  nitroglycerin (NITROSTAT) 0.4 mg SL tablet by SubLINGual route every five (5) minutes as needed for Chest Pain.  ARGININE, L-ARGININE, PO Take  by mouth.  omeprazole (PRILOSEC) 20 mg capsule Take 1 Cap by mouth daily.  lisinopril (PRINIVIL, ZESTRIL) 2.5 mg tablet Take 1 Tab by mouth daily.  citalopram (CELEXA) 20 mg tablet Take 20 mg by mouth daily.  meloxicam (MOBIC) 15 mg tablet Take 15 mg by mouth daily.  aspirin 81 mg chewable tablet Take 325 mg by mouth daily.  furosemide (LASIX) 40 mg tablet Take 1 Tab by mouth two (2) times a day. (Patient taking differently: Take 40 mg by mouth as needed.)  Comp. Stocking,Thigh,Reg,Medium misc 1 Package by Does Not Apply route daily. Disposition: 
discharge DISCHARGE NOTE:  
 
Pt has been reexamined. Patient has no new complaints, changes, or physical findings. Care plan outlined and precautions discussed. Results of visit were reviewed with the patient. All medications were reviewed with the patient; will d/c home. All of pt's questions and concerns were addressed. Patient was instructed and agrees to follow up with PCP, as well as to return to the ED upon further deterioration. Patient is ready to go home. Follow-up Information Follow up With Details Comments Contact Info Ruth Josue MD Call As needed, follow up [address] [place] [address] [phone] [facility] EMERGENCY DEPT  If symptoms worsen [address] [place] [address] [address] Current Discharge Medication List  
  
START taking these medications Details  
acetaminophen (TYLENOL) 325 mg tablet Take 2 Tabs by mouth every four (4) hours as needed for Pain. Qty: 20 Tab, Refills: 0 Diagnosis Clinical Impression: 1. Knee swelling 2. Right ankle swelling 3. Urinary frequency

## 2018-09-19 NOTE — DISCHARGE INSTRUCTIONS
Frequent Urination: Care Instructions  Your Care Instructions  An urge to urinate frequently but usually passing only small amounts of urine is a common symptom of urinary problems, such as urinary tract infections. The bladder may become inflamed. This can cause the urge to urinate. You may try to urinate more often than usual to try to soothe that urge. Frequent urination also may be caused by sexually transmitted infections (STIs) or kidney stones. Or it may happen when something irritates the tube that carries urine from the bladder to the outside of the body (urethra). It may also be a sign of diabetes. The cause may be hard to find. You may need tests. Follow-up care is a key part of your treatment and safety. Be sure to make and go to all appointments, and call your doctor if you are having problems. It's also a good idea to know your test results and keep a list of the medicines you take. How can you care for yourself at home? · Drink extra water for the next day or two. This will help make the urine less concentrated. (If you have kidney, heart, or liver disease and have to limit fluids, talk with your doctor before you increase the amount of fluids you drink.)  · Avoid drinks that are carbonated or have caffeine. They can irritate the bladder. For women:  · Urinate right after you have sex. · After you go to the bathroom, wipe from front to back. · Avoid douches, bubble baths, and feminine hygiene sprays. And avoid other feminine hygiene products that have deodorants. When should you call for help? Call your doctor now or seek immediate medical care if:    · You have new symptoms, such as fever, nausea, or vomiting.     · You have new or worse symptoms of a urinary problem. For example:  ¨ You have blood or pus in your urine. ¨ You have chills or body aches. ¨ It hurts to urinate. ¨ You have groin or belly pain. ¨ You have pain in your back just below your rib cage (the flank area).  Watch closely for changes in your health, and be sure to contact your doctor if you feel thirstier than usual.  Where can you learn more? Go to http://hugo-sherley.info/. Enter 136 [id number] in the search box to learn more about \"Frequent Urination: Care Instructions. \"  Current as of: May 12, 2017  Content Version: 11.7  © 1768-2372 [employer]. Care instructions adapted under license by [employer] (which disclaims liability or warranty for this information). If you have questions about a medical condition or this instruction, always ask your healthcare professional. [place] [address] any warranty or liability for your use of this information.

## 2018-09-19 NOTE — ED TRIAGE NOTES
The patient presents for evaluation of bilateral lower extremity swelling x \"a couple of years\" and urinary frequency x \"a couple of months. \"

## 2018-09-22 LAB
C TRACH RRNA SPEC QL NAA+PROBE: NEGATIVE
N GONORRHOEA RRNA SPEC QL NAA+PROBE: NEGATIVE
SPECIMEN SOURCE: NORMAL
T VAGINALIS RRNA SPEC QL NAA+PROBE: NEGATIVE

## 2018-10-15 ENCOUNTER — HOSPITAL ENCOUNTER (EMERGENCY)
Age: 73
Discharge: HOME | End: 2018-10-15
Attending: EMERGENCY MEDICINE
Payer: MEDICARE

## 2018-10-15 ENCOUNTER — APPOINTMENT (OUTPATIENT)
Dept: VASCULAR SURGERY | Age: 73
End: 2018-10-15
Attending: EMERGENCY MEDICINE
Payer: MEDICARE

## 2018-10-15 ENCOUNTER — APPOINTMENT (OUTPATIENT)
Dept: GENERAL RADIOLOGY | Age: 73
End: 2018-10-15
Attending: PHYSICIAN ASSISTANT
Payer: MEDICARE

## 2018-10-15 VITALS
HEIGHT: 74 IN | TEMPERATURE: 98 F | RESPIRATION RATE: 16 BRPM | SYSTOLIC BLOOD PRESSURE: 115 MMHG | WEIGHT: 180 LBS | BODY MASS INDEX: 23.1 KG/M2 | OXYGEN SATURATION: 99 % | DIASTOLIC BLOOD PRESSURE: 68 MMHG | HEART RATE: 70 BPM

## 2018-10-15 DIAGNOSIS — R30.0 DYSURIA: ICD-10-CM

## 2018-10-15 DIAGNOSIS — R60.0 LEG EDEMA: Primary | ICD-10-CM

## 2018-10-15 LAB
ALBUMIN SERPL-MCNC: 3.9 G/DL (ref 3.4–5)
ALBUMIN/GLOB SERPL: 1.1 {RATIO} (ref 0.8–1.7)
ALP SERPL-CCNC: 53 U/L (ref 45–117)
ALT SERPL-CCNC: 25 U/L (ref 16–61)
ANION GAP SERPL CALC-SCNC: 7 MMOL/L (ref 3–18)
APPEARANCE UR: CLEAR
AST SERPL-CCNC: 34 U/L (ref 15–37)
ATRIAL RATE: 68 BPM
BASOPHILS # BLD: 0 K/UL (ref 0–0.1)
BASOPHILS NFR BLD: 0 % (ref 0–2)
BILIRUB SERPL-MCNC: 0.9 MG/DL (ref 0.2–1)
BILIRUB UR QL: NEGATIVE
BNP SERPL-MCNC: 103 PG/ML (ref 0–900)
BUN SERPL-MCNC: 17 MG/DL (ref 7–18)
BUN/CREAT SERPL: 14 (ref 12–20)
CALCIUM SERPL-MCNC: 8.9 MG/DL (ref 8.5–10.1)
CALCULATED P AXIS, ECG09: 71 DEGREES
CALCULATED R AXIS, ECG10: 49 DEGREES
CALCULATED T AXIS, ECG11: -10 DEGREES
CHLORIDE SERPL-SCNC: 104 MMOL/L (ref 100–108)
CK MB CFR SERPL CALC: 1.5 % (ref 0–4)
CK MB SERPL-MCNC: 3 NG/ML (ref 5–25)
CK SERPL-CCNC: 196 U/L (ref 39–308)
CO2 SERPL-SCNC: 30 MMOL/L (ref 21–32)
COLOR UR: ABNORMAL
CREAT SERPL-MCNC: 1.24 MG/DL (ref 0.6–1.3)
DIAGNOSIS, 93000: NORMAL
DIFFERENTIAL METHOD BLD: ABNORMAL
EOSINOPHIL # BLD: 0.4 K/UL (ref 0–0.4)
EOSINOPHIL NFR BLD: 7 % (ref 0–5)
ERYTHROCYTE [DISTWIDTH] IN BLOOD BY AUTOMATED COUNT: 13.7 % (ref 11.6–14.5)
GLOBULIN SER CALC-MCNC: 3.6 G/DL (ref 2–4)
GLUCOSE SERPL-MCNC: 88 MG/DL (ref 74–99)
GLUCOSE UR STRIP.AUTO-MCNC: NEGATIVE MG/DL
HCT VFR BLD AUTO: 42.9 % (ref 36–48)
HGB BLD-MCNC: 14.5 G/DL (ref 13–16)
HGB UR QL STRIP: NEGATIVE
KETONES UR QL STRIP.AUTO: ABNORMAL MG/DL
LEUKOCYTE ESTERASE UR QL STRIP.AUTO: NEGATIVE
LYMPHOCYTES # BLD: 2.3 K/UL (ref 0.9–3.6)
LYMPHOCYTES NFR BLD: 42 % (ref 21–52)
MCH RBC QN AUTO: 29.7 PG (ref 24–34)
MCHC RBC AUTO-ENTMCNC: 33.8 G/DL (ref 31–37)
MCV RBC AUTO: 87.7 FL (ref 74–97)
MONOCYTES # BLD: 0.5 K/UL (ref 0.05–1.2)
MONOCYTES NFR BLD: 10 % (ref 3–10)
NEUTS SEG # BLD: 2.3 K/UL (ref 1.8–8)
NEUTS SEG NFR BLD: 41 % (ref 40–73)
NITRITE UR QL STRIP.AUTO: NEGATIVE
P-R INTERVAL, ECG05: 122 MS
PH UR STRIP: 5 [PH] (ref 5–8)
PLATELET # BLD AUTO: 237 K/UL (ref 135–420)
PMV BLD AUTO: 9.2 FL (ref 9.2–11.8)
POTASSIUM SERPL-SCNC: 5 MMOL/L (ref 3.5–5.5)
PROT SERPL-MCNC: 7.5 G/DL (ref 6.4–8.2)
PROT UR STRIP-MCNC: NEGATIVE MG/DL
Q-T INTERVAL, ECG07: 424 MS
QRS DURATION, ECG06: 82 MS
QTC CALCULATION (BEZET), ECG08: 450 MS
RBC # BLD AUTO: 4.89 M/UL (ref 4.7–5.5)
SODIUM SERPL-SCNC: 141 MMOL/L (ref 136–145)
SP GR UR REFRACTOMETRY: 1.03 (ref 1–1.03)
TROPONIN I SERPL-MCNC: <0.02 NG/ML (ref 0–0.04)
UROBILINOGEN UR QL STRIP.AUTO: 1 EU/DL (ref 0.2–1)
VENTRICULAR RATE, ECG03: 68 BPM
WBC # BLD AUTO: 5.6 K/UL (ref 4.6–13.2)

## 2018-10-15 PROCEDURE — 99285 EMERGENCY DEPT VISIT HI MDM: CPT

## 2018-10-15 PROCEDURE — 93005 ELECTROCARDIOGRAM TRACING: CPT

## 2018-10-15 PROCEDURE — 83880 ASSAY OF NATRIURETIC PEPTIDE: CPT | Performed by: PHYSICIAN ASSISTANT

## 2018-10-15 PROCEDURE — 82550 ASSAY OF CK (CPK): CPT | Performed by: PHYSICIAN ASSISTANT

## 2018-10-15 PROCEDURE — 80053 COMPREHEN METABOLIC PANEL: CPT | Performed by: PHYSICIAN ASSISTANT

## 2018-10-15 PROCEDURE — 71046 X-RAY EXAM CHEST 2 VIEWS: CPT

## 2018-10-15 PROCEDURE — 93971 EXTREMITY STUDY: CPT

## 2018-10-15 PROCEDURE — 85025 COMPLETE CBC W/AUTO DIFF WBC: CPT | Performed by: PHYSICIAN ASSISTANT

## 2018-10-15 PROCEDURE — 81003 URINALYSIS AUTO W/O SCOPE: CPT | Performed by: PHYSICIAN ASSISTANT

## 2018-10-15 RX ORDER — PHENAZOPYRIDINE HYDROCHLORIDE 100 MG/1
200 TABLET, FILM COATED ORAL
Qty: 18 TAB | Refills: 0 | Status: SHIPPED | OUTPATIENT
Start: 2018-10-15 | End: 2018-10-18

## 2018-10-15 NOTE — ED NOTES
74 yo M who presents due to dyspnea, leg edema, urinary frequency. I performed a brief evaluation, including history and physical, of the patient here in triage and I have determined that pt will need further treatment and evaluation from the main side ER physician. I have placed initial orders to help in expediting patients care. October 15, 2018 at 10:42 AM - Marilou Fuentes

## 2018-10-15 NOTE — ED PROVIDER NOTES
EMERGENCY DEPARTMENT HISTORY AND PHYSICAL EXAM 
 
11:47 AM 
 
 
Date: 10/15/2018 Patient Name: Sisi Higginbotham History of Presenting Illness Chief Complaint Patient presents with  Shortness of Breath  Urinary Frequency  Leg Swelling  
  bilateral  
 
 
 
History Provided By: Patient Additional History (Context): Sisi Higginbotham is a 75 y.o. male with a past medical history of dyslipidemia and benign essential HTN who presents with c/o shortness of breath onset 1 month ago. Patient describes his shortness of breath as constant and moderate. Associated symptoms include chest pain and bilateral lower extremities swelling. He states that he last saw his PCP 2 weeks ago and his symptoms has not improved since. He has been taking his fluid pills. He also c/o dysuria and frequency onset 1 month ago. He was also seen 1 month ago for the same symptoms but they have not improved since. NKDA. Patient denies exposure to TB, fever, vomiting, hx of DM, and any other associated symptoms or complaints. PCP: Karin Snyder MD 
 
Current Outpatient Prescriptions Medication Sig Dispense Refill  phenazopyridine (PYRIDIUM) 100 mg tablet Take 2 Tabs by mouth three (3) times daily as needed for Pain for up to 3 days. 18 Tab 0  
 acetaminophen (TYLENOL) 325 mg tablet Take 2 Tabs by mouth every four (4) hours as needed for Pain. 20 Tab 0  ibuprofen (MOTRIN) 800 mg tablet Take 800 mg by mouth two (2) times daily as needed for Pain (knee and leg pain).  baclofen (LIORESAL) 20 mg tablet Take 20 mg by mouth two (2) times daily as needed.  carvedilol (COREG) 3.125 mg tablet Take 1 Tab by mouth two (2) times daily (with meals). 60 Tab 3  
 cilostazol (PLETAL) 50 mg tablet Take  by mouth Before breakfast and dinner.  mirabegron ER (MYRBETRIQ) 25 mg ER tablet Take 25 mg by mouth daily.  cholecalciferol (VITAMIN D3) 1,000 unit tablet Take  by mouth daily.  simvastatin (ZOCOR) 20 mg tablet Take 0.5 Tabs by mouth nightly. 45 Tab 2  
 albuterol sulfate 90 mcg/actuation aepb Take 1 Puff by inhalation every four (4) hours as needed for Cough. 1 Inhaler 0  
 pentoxifylline CR (TRENTAL) 400 mg CR tablet Take 1 Tab by mouth three (3) times daily (with meals). 90 Tab 3  
 gabapentin (NEURONTIN) 100 mg capsule Take 100 mg by mouth nightly.  nitroglycerin (NITROSTAT) 0.4 mg SL tablet by SubLINGual route every five (5) minutes as needed for Chest Pain.  ARGININE, L-ARGININE, PO Take  by mouth.  omeprazole (PRILOSEC) 20 mg capsule Take 1 Cap by mouth daily. 30 Cap 4  
 lisinopril (PRINIVIL, ZESTRIL) 2.5 mg tablet Take 1 Tab by mouth daily. 90 Tab 3  
 citalopram (CELEXA) 20 mg tablet Take 20 mg by mouth daily.  meloxicam (MOBIC) 15 mg tablet Take 15 mg by mouth daily.  aspirin 81 mg chewable tablet Take 325 mg by mouth daily.  furosemide (LASIX) 40 mg tablet Take 1 Tab by mouth two (2) times a day. (Patient taking differently: Take 40 mg by mouth as needed.) 60 Tab 4  Comp. Stocking,Thigh,Reg,Medium misc 1 Package by Does Not Apply route daily. 2 Package 0 Past History Past Medical History: 
Past Medical History:  
Diagnosis Date  Benign essential HTN 2/17/2016  Dyslipidemia 1/24/2017 Past Surgical History: 
Past Surgical History:  
Procedure Laterality Date  HX CHOLECYSTECTOMY  HX GI    
 HX HERNIA REPAIR Bilateral   
 
 
Family History: 
Family History Problem Relation Age of Onset  Heart Attack Neg Hx  Stroke Neg Hx Social History: 
Social History Substance Use Topics  Smoking status: Never Smoker  Smokeless tobacco: Never Used  Alcohol use No  
 
 
Allergies: 
No Known Allergies Review of Systems Review of Systems Constitutional: Negative for fever. Respiratory: Positive for shortness of breath. Cardiovascular: Positive for chest pain. Gastrointestinal: Positive for constipation and diarrhea (mild). Negative for vomiting. Genitourinary: Positive for dysuria. Musculoskeletal: Positive for joint swelling (bilateral legs and knees). All other systems reviewed and are negative. Physical Exam  
 
Visit Vitals  /68  Pulse 72  Temp 97.1 °F (36.2 °C)  Resp 16  
 Ht 6' 2\" (1.88 m)  Wt 81.6 kg (180 lb)  SpO2 100%  BMI 23.11 kg/m2 Physical Exam  
Constitutional: He is oriented to person, place, and time. He appears well-developed and well-nourished. No distress. HENT:  
Head: Normocephalic and atraumatic. Mouth/Throat: Mucous membranes are normal.  
Eyes: Conjunctivae are normal. Pupils are equal, round, and reactive to light. Neck: Normal range of motion. Neck supple. No JVD. Cardiovascular: Normal rate and regular rhythm. Exam reveals no gallop and no friction rub. No murmur heard. Regular rate and rhythm. Equal 2+ radial pulses. Pulmonary/Chest: Effort normal. No respiratory distress. He has no wheezes. He has rales. Scattered crackles at the bases. Pleuritic chest pain but not reproducible. Abdominal: Soft. Bowel sounds are normal. He exhibits no distension. There is no tenderness. Normal bowel sounds. Soft abdomen. Musculoskeletal: Normal range of motion. He exhibits no edema. No calf tenderness. Non pitting bilateral lower extremity edema. Neurological: He is alert and oriented to person, place, and time. No cranial nerve deficit. Skin: Skin is warm and dry. Diagnostic Study Results Labs - Recent Results (from the past 12 hour(s)) EKG, 12 LEAD, INITIAL Collection Time: 10/15/18 11:06 AM  
Result Value Ref Range Ventricular Rate 68 BPM  
 Atrial Rate 68 BPM  
 P-R Interval 122 ms QRS Duration 82 ms Q-T Interval 424 ms QTC Calculation (Bezet) 450 ms Calculated P Axis 71 degrees Calculated R Axis 49 degrees Calculated T Axis -10 degrees Diagnosis Sinus rhythm with occasional premature ventricular complexes Minimal voltage criteria for LVH, may be normal variant Nonspecific T wave abnormality Abnormal ECG When compared with ECG of 31-JUL-2017 10:24, 
premature ventricular complexes are now present 
premature supraventricular complexes are no longer present Nonspecific T wave abnormality, worse in Inferior leads Nonspecific T wave abnormality, worse in Lateral leads Confirmed by Daniel Chirinos ([id number]) on 10/15/2018 12:22:46 PM 
  
CBC WITH AUTOMATED DIFF Collection Time: 10/15/18 11:11 AM  
Result Value Ref Range WBC 5.6 4.6 - 13.2 K/uL  
 RBC 4.89 4.70 - 5.50 M/uL  
 HGB 14.5 13.0 - 16.0 g/dL HCT 42.9 36.0 - 48.0 % MCV 87.7 74.0 - 97.0 FL  
 MCH 29.7 24.0 - 34.0 PG  
 MCHC 33.8 31.0 - 37.0 g/dL  
 RDW 13.7 11.6 - 14.5 % PLATELET 874 120 - 852 K/uL MPV 9.2 9.2 - 11.8 FL  
 NEUTROPHILS 41 40 - 73 % LYMPHOCYTES 42 21 - 52 % MONOCYTES 10 3 - 10 % EOSINOPHILS 7 (H) 0 - 5 % BASOPHILS 0 0 - 2 %  
 ABS. NEUTROPHILS 2.3 1.8 - 8.0 K/UL  
 ABS. LYMPHOCYTES 2.3 0.9 - 3.6 K/UL  
 ABS. MONOCYTES 0.5 0.05 - 1.2 K/UL  
 ABS. EOSINOPHILS 0.4 0.0 - 0.4 K/UL  
 ABS. BASOPHILS 0.0 0.0 - 0.1 K/UL  
 DF AUTOMATED METABOLIC PANEL, COMPREHENSIVE Collection Time: 10/15/18 11:11 AM  
Result Value Ref Range Sodium 141 136 - 145 mmol/L Potassium 5.0 3.5 - 5.5 mmol/L Chloride 104 100 - 108 mmol/L  
 CO2 30 21 - 32 mmol/L Anion gap 7 3.0 - 18 mmol/L Glucose 88 74 - 99 mg/dL BUN 17 7.0 - 18 MG/DL Creatinine 1.24 0.6 - 1.3 MG/DL  
 BUN/Creatinine ratio 14 12 - 20 GFR est AA >60 >60 ml/min/1.73m2 GFR est non-AA 57 (L) >60 ml/min/1.73m2 Calcium 8.9 8.5 - 10.1 MG/DL Bilirubin, total 0.9 0.2 - 1.0 MG/DL  
 ALT (SGPT) 25 16 - 61 U/L  
 AST (SGOT) 34 15 - 37 U/L Alk. phosphatase 53 45 - 117 U/L Protein, total 7.5 6.4 - 8.2 g/dL Albumin 3.9 3.4 - 5.0 g/dL Globulin 3.6 2.0 - 4.0 g/dL A-G Ratio 1.1 0.8 - 1.7 CARDIAC PANEL,(CK, CKMB & TROPONIN) Collection Time: 10/15/18 11:11 AM  
Result Value Ref Range  39 - 308 U/L  
 CK - MB 3.0 <3.6 ng/ml CK-MB Index 1.5 0.0 - 4.0 % Troponin-I, Qt. <0.02 0.0 - 0.045 NG/ML  
NT-PRO BNP Collection Time: 10/15/18 11:11 AM  
Result Value Ref Range NT pro- 0 - 900 PG/ML  
URINALYSIS W/ RFLX MICROSCOPIC Collection Time: 10/15/18 11:50 AM  
Result Value Ref Range Color DARK YELLOW Appearance CLEAR Specific gravity 1.026 1.005 - 1.030    
 pH (UA) 5.0 5.0 - 8.0 Protein NEGATIVE  NEG mg/dL Glucose NEGATIVE  NEG mg/dL Ketone TRACE (A) NEG mg/dL Bilirubin NEGATIVE  NEG Blood NEGATIVE  NEG Urobilinogen 1.0 0.2 - 1.0 EU/dL Nitrites NEGATIVE  NEG Leukocyte Esterase NEGATIVE  NEG Radiologic Studies -  
XR CHEST PA LAT Final Result Xr Chest Pa Lat Result Date: 10/15/2018 EXAM: CHEST PA AND LATERAL CLINICAL HISTORY/INDICATION:  SOB , urinary frequency, difficulty swallowing, lower extremity edema, times several months COMPARISON: Chest x-ray July 31, 2017. TECHNIQUE: PA and lateral views FINDINGS:  The cardiac silhouette is normal. Stable calcified plaque at the aortic arch. The lungs are clear and mildly hyperinflated. The costophrenic angles are sharply defined. Pulmonary vascularity is normal. Mild disc space narrowing with marginal spurring involves the mid and lower thoracic spine. IMPRESSION: Stable hyperinflation Medical Decision Making I am the first provider for this patient. I reviewed the vital signs, available nursing notes, past medical history, past surgical history, family history and social history. Vital Signs-Reviewed the patient's vital signs. Records Reviewed: Nursing Notes ED Course: Progress Notes, Reevaluation, and Consults: 
 
 
Provider Notes (Medical Decision Making): PILLO 
 Number of Diagnoses or Management Options Dysuria:  
Leg edema:  
Diagnosis management comments: Dif fdx: chf exac, uti, DVT Pt w/multiple complaints including dysuria, leg swelling, will obtain duplex to r/o dvt, bnp to r/o CHF exac although very mild dysfunction on last echo (EF 45% with grade 1 diastolic dysfunction). Taking meds as rx'd. Will get basic labs, ua, r/o other pathology and pt will likely be safe for dc, vs wnl at this time, has good f/u 
 
2:43 PM 
Duplex neg, labs wnl, pt comfortable with stable vs, bnp not very elevated, trop neg, no UTI, will rx phenazo for dysuria, rec pt f/u with his pcp for further workup Safe for d/c, careful return precautions given. Pt/family comfortable with plan Ryan Laguerre MD 
 
 
 
 
 
 
Diagnosis Clinical Impression: 1. Leg edema 2. Dysuria Disposition: home Follow-up Information Follow up With Details Comments Contact Info Donta Freitas MD In 2 days  [address] [place] [id number] 
[phone] [facility] EMERGENCY DEPT  As needed, If symptoms worsen [address] Patient's Medications Start Taking PHENAZOPYRIDINE (PYRIDIUM) 100 MG TABLET    Take 2 Tabs by mouth three (3) times daily as needed for Pain for up to 3 days. Continue Taking ACETAMINOPHEN (TYLENOL) 325 MG TABLET    Take 2 Tabs by mouth every four (4) hours as needed for Pain. ALBUTEROL SULFATE 90 MCG/ACTUATION AEPB    Take 1 Puff by inhalation every four (4) hours as needed for Cough. ARGININE, L-ARGININE, PO    Take  by mouth. ASPIRIN 81 MG CHEWABLE TABLET    Take 325 mg by mouth daily. BACLOFEN (LIORESAL) 20 MG TABLET    Take 20 mg by mouth two (2) times daily as needed. CARVEDILOL (COREG) 3.125 MG TABLET    Take 1 Tab by mouth two (2) times daily (with meals). CHOLECALCIFEROL (VITAMIN D3) 1,000 UNIT TABLET    Take  by mouth daily. CILOSTAZOL (PLETAL) 50 MG TABLET    Take  by mouth Before breakfast and dinner. CITALOPRAM (CELEXA) 20 MG TABLET    Take 20 mg by mouth daily. COMP. [id number] [place] [id number] Package by Does Not Apply route daily. FUROSEMIDE (LASIX) 40 MG TABLET    Take 1 Tab by mouth two (2) times a day. GABAPENTIN (NEURONTIN) 100 MG CAPSULE    Take 100 mg by mouth nightly. IBUPROFEN (MOTRIN) 800 MG TABLET    Take 800 mg by mouth two (2) times daily as needed for Pain (knee and leg pain). LISINOPRIL (PRINIVIL, ZESTRIL) 2.5 MG TABLET    Take 1 Tab by mouth daily. MELOXICAM (MOBIC) 15 MG TABLET    Take 15 mg by mouth daily. MIRABEGRON ER (MYRBETRIQ) 25 MG ER TABLET    Take 25 mg by mouth daily. NITROGLYCERIN (NITROSTAT) 0.4 MG SL TABLET    by SubLINGual route every five (5) minutes as needed for Chest Pain. OMEPRAZOLE (PRILOSEC) 20 MG CAPSULE    Take 1 Cap by mouth daily. PENTOXIFYLLINE CR (TRENTAL) 400 MG CR TABLET    Take 1 Tab by mouth three (3) times daily (with meals). SIMVASTATIN (ZOCOR) 20 MG TABLET    Take 0.5 Tabs by mouth nightly. These Medications have changed No medications on file Stop Taking No medications on file  
 
_______________________________ Attestations: 
Scribe Attestation Carlos Acuña acting as a scribe for and in the presence of Leopoldo Jo, MD     
October 15, 2018 at 11:47 AM 
    
Provider Attestation:     
I personally performed the services described in the documentation, reviewed the documentation, as recorded by the scribe in my presence, and it accurately and completely records my words and actions. October 15, 2018 at 11:47 AM - Leopoldo Jo, MD   
_______________________________

## 2018-10-15 NOTE — DISCHARGE INSTRUCTIONS
Painful Urination (Dysuria): Care Instructions  Your Care Instructions  Burning pain with urination (dysuria) is a common symptom of a urinary tract infection or other urinary problems. The bladder may become inflamed. This can cause pain when the bladder fills and empties. You may also feel pain if the tube that carries urine from the bladder to the outside of the body (urethra) gets irritated or infected. Sexually transmitted infections (STIs) also may cause pain when you urinate. Sometimes the pain can be caused by things other than an infection. The urethra can be irritated by soaps, perfumes, or foreign objects in the urethra. Kidney stones can cause pain when they pass through the urethra. The cause may be hard to find. You may need tests. Treatment for painful urination depends on the cause. Follow-up care is a key part of your treatment and safety. Be sure to make and go to all appointments, and call your doctor if you are having problems. It's also a good idea to know your test results and keep a list of the medicines you take. How can you care for yourself at home? · Drink extra water for the next day or two. This will help make the urine less concentrated. (If you have kidney, heart, or liver disease and have to limit fluids, talk with your doctor before you increase the amount of fluids you drink.)  · Avoid drinks that are carbonated or have caffeine. They can irritate the bladder. · Urinate often. Try to empty your bladder each time. For women:  · Urinate right after you have sex. · After going to the bathroom, wipe from front to back. · Avoid douches, bubble baths, and feminine hygiene sprays. And avoid other feminine hygiene products that have deodorants. When should you call for help? Call your doctor now or seek immediate medical care if:    · You have new symptoms, such as fever, nausea, or vomiting.     · You have new or worse symptoms of a urinary problem.  For example:  Aaron Hinkle have blood or pus in your urine. ¨ You have chills or body aches. ¨ It hurts worse to urinate. ¨ You have groin or belly pain. ¨ You have pain in your back just below your rib cage (the flank area).    Watch closely for changes in your health, and be sure to contact your doctor if you have any problems. Where can you learn more? Go to http://hugo-sherley.info/. Enter O496 in the search box to learn more about \"Painful Urination (Dysuria): Care Instructions. \"  Current as of: March 21, 2018  Content Version: 11.8  © 4899-4333 [email or web address]. Care instructions adapted under license by [employer] (which disclaims liability or warranty for this information). If you have questions about a medical condition or this instruction, always ask your healthcare professional. [place] [address] any warranty or liability for your use of this information. Leg and Ankle Edema: Care Instructions  Your Care Instructions  Swelling in the legs, ankles, and feet is called edema. It is common after you sit or stand for a while. Long plane flights or car rides often cause swelling in the legs and feet. You may also have swelling if you have to stand for long periods of time at your job. Problems with the veins in the legs (varicose veins) and changes in hormones can also cause swelling. Sometimes the swelling in the ankles and feet is caused by a more serious problem, such as heart failure, infection, blood clots, or liver or kidney disease. Follow-up care is a key part of your treatment and safety. Be sure to make and go to all appointments, and call your doctor if you are having problems. It's also a good idea to know your test results and keep a list of the medicines you take. How can you care for yourself at home? · If your doctor gave you medicine, take it as prescribed. Call your doctor if you think you are having a problem with your medicine.   · Whenever you are resting, raise your legs up. Try to keep the swollen area higher than the level of your heart. · Take breaks from standing or sitting in one position. ¨ Walk around to increase the blood flow in your lower legs. ¨ Move your feet and ankles often while you stand, or tighten and relax your leg muscles. · Wear support stockings. Put them on in the morning, before swelling gets worse. · Eat a balanced diet. Lose weight if you need to. · Limit the amount of salt (sodium) in your diet. Salt holds fluid in the body and may increase swelling. When should you call for help? Call 911 anytime you think you may need emergency care. For example, call if:    · You have symptoms of a blood clot in your lung (called a pulmonary embolism). These may include:  ¨ Sudden chest pain. ¨ Trouble breathing. ¨ Coughing up blood.    Call your doctor now or seek immediate medical care if:    · You have signs of a blood clot, such as:  ¨ Pain in your calf, back of the knee, thigh, or groin. ¨ Redness and swelling in your leg or groin.     · You have symptoms of infection, such as:  ¨ Increased pain, swelling, warmth, or redness. ¨ Red streaks or pus. ¨ A fever.    Watch closely for changes in your health, and be sure to contact your doctor if:    · Your swelling is getting worse.     · You have new or worsening pain in your legs.     · You do not get better as expected. Where can you learn more? Go to http://hugo-sherley.info/. Enter S320 in the search box to learn more about \"Leg and Ankle Edema: Care Instructions. \"  Current as of: November 20, 2017  Content Version: 11.8  © 1740-0861 [employer]. Care instructions adapted under license by [employer] (which disclaims liability or warranty for this information).  If you have questions about a medical condition or this instruction, always ask your healthcare professional. [employer] disclaims any warranty or liability for your use of this information.

## 2018-10-15 NOTE — ED NOTES
Reviewed discharge instructions with the patient, including new medications and suggested follow-up. Questions encouraged and answerd. Patient verbalized an understanding

## 2018-10-15 NOTE — ED NOTES
Received patient in room 18. Patient appears in no distress. Cardiac monitor applied. Reviewed poc with patient. Questions encouraged and answered. Patient verbalized an understanding

## 2018-10-15 NOTE — ED TRIAGE NOTES
Pt. States \"I'm having shortness of breath, I'm peeing a lot and I'm having problems swallowing\" Pt. Also states \"I'm having circulation problems and swelling in my legs\". Reports symptoms have been going on for months.

## 2018-11-20 ENCOUNTER — OFFICE VISIT (OUTPATIENT)
Dept: CARDIOLOGY CLINIC | Age: 73
End: 2018-11-20

## 2018-11-20 VITALS
WEIGHT: 185 LBS | BODY MASS INDEX: 23.75 KG/M2 | HEART RATE: 63 BPM | DIASTOLIC BLOOD PRESSURE: 75 MMHG | SYSTOLIC BLOOD PRESSURE: 118 MMHG

## 2018-11-20 DIAGNOSIS — I73.9 PAD (PERIPHERAL ARTERY DISEASE) (HCC): ICD-10-CM

## 2018-11-20 DIAGNOSIS — I50.32 CHRONIC DIASTOLIC HEART FAILURE (HCC): ICD-10-CM

## 2018-11-20 DIAGNOSIS — I51.9 LV DYSFUNCTION: ICD-10-CM

## 2018-11-20 DIAGNOSIS — E78.5 DYSLIPIDEMIA: Primary | ICD-10-CM

## 2018-11-20 DIAGNOSIS — I25.118 CORONARY ARTERY DISEASE OF NATIVE ARTERY OF NATIVE HEART WITH STABLE ANGINA PECTORIS (HCC): ICD-10-CM

## 2018-11-20 DIAGNOSIS — I10 BENIGN ESSENTIAL HTN: ICD-10-CM

## 2018-11-20 RX ORDER — SILDENAFIL 100 MG/1
100 TABLET, FILM COATED ORAL AS NEEDED
COMMUNITY
End: 2019-06-04

## 2018-11-20 RX ORDER — CARVEDILOL 3.12 MG/1
3.12 TABLET ORAL 2 TIMES DAILY WITH MEALS
Qty: 60 TAB | Refills: 6 | Status: SHIPPED | OUTPATIENT
Start: 2018-11-20 | End: 2019-06-04

## 2018-11-20 NOTE — PROGRESS NOTES
1. Have you been to the ER, urgent care clinic since your last visit? Hospitalized since your last visit? No    2. Have you seen or consulted any other health care providers outside of the [address] [place] [address] since your last visit? Include any pap smears or colon screening. Yes Where: PCP     3. Since your last visit, have you had any of the following symptoms? shortness of breath, dizziness and swelling in legs/arms. 4.  Have you had any blood work, X-rays or cardiac testing? Yes Where: [facility] Reason for visit: Labs            5.  Where do you normally have your labs drawn? [facility]    6. Do you need any refills today?    No

## 2018-11-20 NOTE — PROGRESS NOTES
HISTORY OF PRESENT ILLNESS  Juan F Hernadez is a 75 y.o. male. CHF   The history is provided by the patient. This is a chronic problem. The current episode started more than 1 week ago. The problem occurs daily. The problem has been gradually improving. Associated symptoms include shortness of breath. Pertinent negatives include no chest pain. Claudication   The history is provided by the patient. This is a chronic problem. The current episode started more than 1 week ago. The problem occurs daily. The problem has been gradually improving. Associated symptoms include shortness of breath. Pertinent negatives include no chest pain. The symptoms are aggravated by exertion and walking. The symptoms are relieved by rest. He has tried rest for the symptoms. Review of Systems   Constitutional: Negative for chills and weight loss. HENT: Negative for hearing loss. Eyes: Negative for blurred vision. Respiratory: Positive for shortness of breath. Negative for stridor. Cardiovascular: Positive for claudication. Negative for chest pain. Gastrointestinal: Negative for heartburn. Musculoskeletal: Negative for myalgias. Neurological: Negative for tingling, tremors, focal weakness and loss of consciousness. Psychiatric/Behavioral: Negative for depression and suicidal ideas.      Family History   Problem Relation Age of Onset    Heart Attack Neg Hx     Stroke Neg Hx        Past Medical History:   Diagnosis Date    Benign essential HTN 2/17/2016    Dyslipidemia 1/24/2017       Past Surgical History:   Procedure Laterality Date    HX CHOLECYSTECTOMY      HX GI      HX HERNIA REPAIR Bilateral        Social History     Tobacco Use    Smoking status: Never Smoker    Smokeless tobacco: Never Used   Substance Use Topics    Alcohol use: No       No Known Allergies    Outpatient Medications Marked as Taking for the 11/20/18 encounter (Office Visit) with Ambrose Neff MD   Medication Sig Dispense Refill    sildenafil citrate (VIAGRA) 100 mg tablet Take 100 mg by mouth as needed.  carvedilol (COREG) 3.125 mg tablet Take 1 Tab by mouth two (2) times daily (with meals). 60 Tab 6    acetaminophen (TYLENOL) 325 mg tablet Take 2 Tabs by mouth every four (4) hours as needed for Pain. 20 Tab 0    ibuprofen (MOTRIN) 800 mg tablet Take 800 mg by mouth two (2) times daily as needed for Pain (knee and leg pain).  baclofen (LIORESAL) 20 mg tablet Take 20 mg by mouth two (2) times daily as needed.  mirabegron ER (MYRBETRIQ) 25 mg ER tablet Take 25 mg by mouth daily.  cholecalciferol (VITAMIN D3) 1,000 unit tablet Take  by mouth daily.  simvastatin (ZOCOR) 20 mg tablet Take 0.5 Tabs by mouth nightly. 45 Tab 2    albuterol sulfate 90 mcg/actuation aepb Take 1 Puff by inhalation every four (4) hours as needed for Cough. 1 Inhaler 0    pentoxifylline CR (TRENTAL) 400 mg CR tablet Take 1 Tab by mouth three (3) times daily (with meals). 90 Tab 3    nitroglycerin (NITROSTAT) 0.4 mg SL tablet by SubLINGual route every five (5) minutes as needed for Chest Pain.  omeprazole (PRILOSEC) 20 mg capsule Take 1 Cap by mouth daily. 30 Cap 4    aspirin 81 mg chewable tablet Take 325 mg by mouth daily.  furosemide (LASIX) 40 mg tablet Take 1 Tab by mouth two (2) times a day. (Patient taking differently: Take 40 mg by mouth as needed.) 60 Tab 4    Comp. Stocking,Thigh,Reg,Medium misc 1 Package by Does Not Apply route daily. 2 Package 0        Visit Vitals  /75   Pulse 63   Wt 83.9 kg (185 lb)   BMI 23.75 kg/m²     Physical Exam   Constitutional: He is oriented to person, place, and time. He appears well-developed and well-nourished. No distress. HENT:   Head: Atraumatic. Mouth/Throat: No oropharyngeal exudate. Eyes: Conjunctivae are normal. No scleral icterus. Neck: Neck supple. No JVD present. Cardiovascular: Normal rate and regular rhythm. Exam reveals no gallop.    No murmur heard.  Pulmonary/Chest: Effort normal and breath sounds normal. No stridor. He has no wheezes. He has no rales. Abdominal: Soft. There is no tenderness. There is no rebound. Musculoskeletal: Normal range of motion. He exhibits edema (mild edema). Neurological: He is alert and oriented to person, place, and time. He exhibits normal muscle tone. Skin: Skin is warm. He is not diaphoretic. Psychiatric: He has a normal mood and affect. His behavior is normal.     6/16 Cardiac Cath  FINDINGS:  1. Left main is patent and bifurcates into left anterior descending artery  and circumflex artery. 2. Left anterior descending artery has mild ectasia with moderate to severe  stenosis in the proximal portion. It appears to be a napkin ring type  lesion. By IVUS imaging, we obtained a stenosis of 62% with an area of 3.7  mm2. Mid to distal left anterior descending artery had wall irregularities  with sluggish flow, suggestive of severe microvascular disease. Apical LAD  had wall irregularities. 3. Diagonal 1 and diagonal 2 artery appeared to be small caliber vessel  with wall irregularities. 4. Circumflex artery is codominant with sluggish flow consistent with  microvascular disease. 5. Right coronary artery is codominant with sluggish flow suggestive of  microvascular disease. 6. Left ventricular end-diastolic pressure was 13 mmHg.     CONCLUSION: Single-vessel coronary artery disease.  Left anterior  descending artery stenosis was 62% in the proximal portion by intravascular  ultrasound imaging. Normal left ventricular and diastolic pressure. The  patient is to be on intense medical management and risk factor  modification. ASSESSMENT and PLAN    ICD-10-CM ICD-9-CM    1. Dyslipidemia E78.5 272.4    2. LV dysfunction I51.9 429.9    3. Coronary artery disease of native artery of native heart with stable angina pectoris ([facility] [address] I25.118 414.01      413.9    4. Benign essential HTN I10 401.1    5.  Chronic diastolic heart failure ([facility]) I50.32 428.32 NUCLEAR CARDIAC STRESS TEST      ECHO ADULT COMPLETE   6. PAD (peripheral artery disease) ([facility]) I73.9 443.9      Orders Placed This Encounter    carvedilol (COREG) 3.125 mg tablet     Sig: Take 1 Tab by mouth two (2) times daily (with meals). Dispense:  60 Tab     Refill:  6     Follow-up Disposition:  Return in about 1 month (around 12/20/2018). Patient with LV dysfunction/ abnormal stress test-- had cardiac cath-62% LAD stenosis by IVUS imaging. The patient was recently admitted to emergency room with shortness of breath. ProBNP was less than 150 and chest x-ray did not reveal any CHF. Is currently not on Coreg not clear why he is not taking it. We will resend a new prescription to his pharmacy and patient was notified of the same. Also reports not taking Lasix as recommended. He was advised to take 40 mg daily. He is not on any ACE inhibitors due to borderline blood pressure. We will proceed with an echocardiogram and a stress to to evaluate LV function and coronary artery disease. Follow-up in 1 month.

## 2018-12-12 ENCOUNTER — TELEPHONE (OUTPATIENT)
Dept: CARDIOLOGY CLINIC | Age: 73
End: 2018-12-12

## 2018-12-12 DIAGNOSIS — I50.32 CHRONIC DIASTOLIC HEART FAILURE (HCC): Primary | ICD-10-CM

## 2019-02-07 ENCOUNTER — HOSPITAL ENCOUNTER (EMERGENCY)
Age: 74
Discharge: HOME | End: 2019-02-07
Attending: EMERGENCY MEDICINE
Payer: MEDICARE

## 2019-02-07 ENCOUNTER — APPOINTMENT (OUTPATIENT)
Dept: VASCULAR SURGERY | Age: 74
End: 2019-02-07
Attending: EMERGENCY MEDICINE
Payer: MEDICARE

## 2019-02-07 ENCOUNTER — APPOINTMENT (OUTPATIENT)
Dept: GENERAL RADIOLOGY | Age: 74
End: 2019-02-07
Attending: NURSE PRACTITIONER
Payer: MEDICARE

## 2019-02-07 VITALS
DIASTOLIC BLOOD PRESSURE: 70 MMHG | HEART RATE: 75 BPM | SYSTOLIC BLOOD PRESSURE: 122 MMHG | TEMPERATURE: 97.8 F | RESPIRATION RATE: 16 BRPM | OXYGEN SATURATION: 99 %

## 2019-02-07 DIAGNOSIS — R60.0 EDEMA EXTREMITIES: ICD-10-CM

## 2019-02-07 DIAGNOSIS — J44.1 COPD WITH ACUTE EXACERBATION (HCC): Primary | ICD-10-CM

## 2019-02-07 DIAGNOSIS — R30.0 DYSURIA: ICD-10-CM

## 2019-02-07 LAB
ALBUMIN SERPL-MCNC: 3.8 G/DL (ref 3.4–5)
ALBUMIN/GLOB SERPL: 1 {RATIO} (ref 0.8–1.7)
ALP SERPL-CCNC: 65 U/L (ref 45–117)
ALT SERPL-CCNC: 23 U/L (ref 16–61)
ANION GAP SERPL CALC-SCNC: 4 MMOL/L (ref 3–18)
APPEARANCE UR: CLEAR
AST SERPL-CCNC: 20 U/L (ref 15–37)
ATRIAL RATE: 75 BPM
BASOPHILS # BLD: 0 K/UL (ref 0–0.1)
BASOPHILS NFR BLD: 1 % (ref 0–2)
BILIRUB SERPL-MCNC: 0.9 MG/DL (ref 0.2–1)
BILIRUB UR QL: NEGATIVE
BNP SERPL-MCNC: 89 PG/ML (ref 0–900)
BUN SERPL-MCNC: 15 MG/DL (ref 7–18)
BUN/CREAT SERPL: 12 (ref 12–20)
CALCIUM SERPL-MCNC: 9.3 MG/DL (ref 8.5–10.1)
CALCULATED P AXIS, ECG09: 67 DEGREES
CALCULATED R AXIS, ECG10: 68 DEGREES
CALCULATED T AXIS, ECG11: 39 DEGREES
CHLORIDE SERPL-SCNC: 102 MMOL/L (ref 100–108)
CK MB CFR SERPL CALC: 1.8 % (ref 0–4)
CK MB SERPL-MCNC: 2.7 NG/ML (ref 5–25)
CK SERPL-CCNC: 154 U/L (ref 39–308)
CO2 SERPL-SCNC: 31 MMOL/L (ref 21–32)
COLOR UR: YELLOW
CREAT SERPL-MCNC: 1.22 MG/DL (ref 0.6–1.3)
DIAGNOSIS, 93000: NORMAL
DIFFERENTIAL METHOD BLD: ABNORMAL
EOSINOPHIL # BLD: 0.3 K/UL (ref 0–0.4)
EOSINOPHIL NFR BLD: 5 % (ref 0–5)
ERYTHROCYTE [DISTWIDTH] IN BLOOD BY AUTOMATED COUNT: 13.8 % (ref 11.6–14.5)
GLOBULIN SER CALC-MCNC: 3.7 G/DL (ref 2–4)
GLUCOSE SERPL-MCNC: 96 MG/DL (ref 74–99)
GLUCOSE UR STRIP.AUTO-MCNC: NEGATIVE MG/DL
HCT VFR BLD AUTO: 42.7 % (ref 36–48)
HGB BLD-MCNC: 14.2 G/DL (ref 13–16)
HGB UR QL STRIP: NEGATIVE
KETONES UR QL STRIP.AUTO: NEGATIVE MG/DL
LEUKOCYTE ESTERASE UR QL STRIP.AUTO: NEGATIVE
LIPASE SERPL-CCNC: 213 U/L (ref 73–393)
LYMPHOCYTES # BLD: 2.4 K/UL (ref 0.9–3.6)
LYMPHOCYTES NFR BLD: 44 % (ref 21–52)
MCH RBC QN AUTO: 28.8 PG (ref 24–34)
MCHC RBC AUTO-ENTMCNC: 33.3 G/DL (ref 31–37)
MCV RBC AUTO: 86.6 FL (ref 74–97)
MONOCYTES # BLD: 0.6 K/UL (ref 0.05–1.2)
MONOCYTES NFR BLD: 12 % (ref 3–10)
NEUTS SEG # BLD: 2.1 K/UL (ref 1.8–8)
NEUTS SEG NFR BLD: 38 % (ref 40–73)
NITRITE UR QL STRIP.AUTO: NEGATIVE
P-R INTERVAL, ECG05: 124 MS
PH UR STRIP: 6 [PH] (ref 5–8)
PLATELET # BLD AUTO: 271 K/UL (ref 135–420)
PMV BLD AUTO: 8.6 FL (ref 9.2–11.8)
POTASSIUM SERPL-SCNC: 4.5 MMOL/L (ref 3.5–5.5)
PROT SERPL-MCNC: 7.5 G/DL (ref 6.4–8.2)
PROT UR STRIP-MCNC: NEGATIVE MG/DL
Q-T INTERVAL, ECG07: 394 MS
QRS DURATION, ECG06: 78 MS
QTC CALCULATION (BEZET), ECG08: 439 MS
RBC # BLD AUTO: 4.93 M/UL (ref 4.7–5.5)
SODIUM SERPL-SCNC: 137 MMOL/L (ref 136–145)
SP GR UR REFRACTOMETRY: 1.02 (ref 1–1.03)
TROPONIN I SERPL-MCNC: <0.02 NG/ML (ref 0–0.04)
UROBILINOGEN UR QL STRIP.AUTO: 0.2 EU/DL (ref 0.2–1)
VENTRICULAR RATE, ECG03: 75 BPM
WBC # BLD AUTO: 5.4 K/UL (ref 4.6–13.2)

## 2019-02-07 PROCEDURE — 81003 URINALYSIS AUTO W/O SCOPE: CPT

## 2019-02-07 PROCEDURE — 80053 COMPREHEN METABOLIC PANEL: CPT

## 2019-02-07 PROCEDURE — 83690 ASSAY OF LIPASE: CPT

## 2019-02-07 PROCEDURE — 85025 COMPLETE CBC W/AUTO DIFF WBC: CPT

## 2019-02-07 PROCEDURE — 96372 THER/PROPH/DIAG INJ SC/IM: CPT

## 2019-02-07 PROCEDURE — 93005 ELECTROCARDIOGRAM TRACING: CPT

## 2019-02-07 PROCEDURE — 82550 ASSAY OF CK (CPK): CPT

## 2019-02-07 PROCEDURE — 83880 ASSAY OF NATRIURETIC PEPTIDE: CPT

## 2019-02-07 PROCEDURE — 74011250636 HC RX REV CODE- 250/636: Performed by: EMERGENCY MEDICINE

## 2019-02-07 PROCEDURE — 74011250637 HC RX REV CODE- 250/637: Performed by: EMERGENCY MEDICINE

## 2019-02-07 PROCEDURE — 99283 EMERGENCY DEPT VISIT LOW MDM: CPT

## 2019-02-07 PROCEDURE — 71046 X-RAY EXAM CHEST 2 VIEWS: CPT

## 2019-02-07 PROCEDURE — 94640 AIRWAY INHALATION TREATMENT: CPT

## 2019-02-07 PROCEDURE — 93970 EXTREMITY STUDY: CPT

## 2019-02-07 RX ORDER — AZITHROMYCIN 250 MG/1
TABLET, FILM COATED ORAL
Qty: 6 TAB | Refills: 0 | Status: SHIPPED | OUTPATIENT
Start: 2019-02-07 | End: 2019-02-12

## 2019-02-07 RX ORDER — DEXAMETHASONE SODIUM PHOSPHATE 4 MG/ML
8 INJECTION, SOLUTION INTRA-ARTICULAR; INTRALESIONAL; INTRAMUSCULAR; INTRAVENOUS; SOFT TISSUE
Status: COMPLETED | OUTPATIENT
Start: 2019-02-07 | End: 2019-02-07

## 2019-02-07 RX ORDER — ALBUTEROL SULFATE 90 UG/1
2 AEROSOL, METERED RESPIRATORY (INHALATION)
Status: COMPLETED | OUTPATIENT
Start: 2019-02-07 | End: 2019-02-07

## 2019-02-07 RX ORDER — ALBUTEROL SULFATE 90 UG/1
2 AEROSOL, METERED RESPIRATORY (INHALATION)
Qty: 1 INHALER | Refills: 0 | Status: SHIPPED | OUTPATIENT
Start: 2019-02-07 | End: 2019-02-12

## 2019-02-07 RX ORDER — PREDNISONE 50 MG/1
50 TABLET ORAL DAILY
Qty: 3 TAB | Refills: 0 | Status: SHIPPED | OUTPATIENT
Start: 2019-02-07 | End: 2019-02-10

## 2019-02-07 RX ADMIN — DEXAMETHASONE SODIUM PHOSPHATE 8 MG: 4 INJECTION, SOLUTION INTRAMUSCULAR; INTRAVENOUS at 13:16

## 2019-02-07 RX ADMIN — ALBUTEROL SULFATE 2 PUFF: 90 AEROSOL, METERED RESPIRATORY (INHALATION) at 13:15

## 2019-02-07 NOTE — ED PROVIDER NOTES
Patient is a 67yo male who presents to the ED for evalaution of multiple complaints. Patient states that he has leg swelling and at times shortness of breath. Patient denies any associated chest pain with this. Patient also complains about dysuria and frequency but states he was placed on lasix for the leg swelling. Patient denies any recent travel or history of DVT. Patient states he has been compliant with his medications. Past Medical History:  
Diagnosis Date  Benign essential HTN 2/17/2016  Dyslipidemia 1/24/2017 Past Surgical History:  
Procedure Laterality Date  HX CHOLECYSTECTOMY  HX GI    
 HX HERNIA REPAIR Bilateral   
 
   
Family History:  
Problem Relation Age of Onset  Heart Attack Neg Hx  Stroke Neg Hx Social History Socioeconomic History  Marital status: SINGLE Spouse name: Not on file  Number of children: Not on file  Years of education: Not on file  Highest education level: Not on file Social Needs  Financial resource strain: Not on file  Food insecurity - worry: Not on file  Food insecurity - inability: Not on file  Transportation needs - medical: Not on file  Transportation needs - non-medical: Not on file Occupational History  Not on file Tobacco Use  Smoking status: Never Smoker  Smokeless tobacco: Never Used Substance and Sexual Activity  Alcohol use: No  
 Drug use: No  
 Sexual activity: Not on file Other Topics Concern  Not on file Social History Narrative  Not on file ALLERGIES: Patient has no known allergies. Review of Systems Respiratory: Positive for shortness of breath. Negative for chest tightness. Cardiovascular: Negative for chest pain. Genitourinary: Positive for dysuria and frequency. Musculoskeletal: Positive for joint swelling. All other systems reviewed and are negative. Vitals:  
 02/07/19 1010 BP: 125/74 Pulse: 76 Resp: 15  
 Temp: 97.6 °F (36.4 °C) SpO2: 100% Physical Exam  
Constitutional: He is oriented to person, place, and time. He appears well-developed and well-nourished. HENT:  
Head: Normocephalic. Eyes: EOM are normal. Pupils are equal, round, and reactive to light. Cardiovascular: Normal rate, regular rhythm and normal heart sounds. Pulmonary/Chest: Effort normal and breath sounds normal. No respiratory distress. Abdominal: Normal appearance and bowel sounds are normal. He exhibits no distension. There is no tenderness. Neurological: He is alert and oriented to person, place, and time. Skin: Skin is warm and dry. Recent Results (from the past 12 hour(s)) CBC WITH AUTOMATED DIFF Collection Time: 02/07/19 10:16 AM  
Result Value Ref Range WBC 5.4 4.6 - 13.2 K/uL  
 RBC 4.93 4.70 - 5.50 M/uL  
 HGB 14.2 13.0 - 16.0 g/dL HCT 42.7 36.0 - 48.0 % MCV 86.6 74.0 - 97.0 FL  
 MCH 28.8 24.0 - 34.0 PG  
 MCHC 33.3 31.0 - 37.0 g/dL  
 RDW 13.8 11.6 - 14.5 % PLATELET 795 824 - 367 K/uL MPV 8.6 (L) 9.2 - 11.8 FL  
 NEUTROPHILS 38 (L) 40 - 73 % LYMPHOCYTES 44 21 - 52 % MONOCYTES 12 (H) 3 - 10 % EOSINOPHILS 5 0 - 5 % BASOPHILS 1 0 - 2 %  
 ABS. NEUTROPHILS 2.1 1.8 - 8.0 K/UL  
 ABS. LYMPHOCYTES 2.4 0.9 - 3.6 K/UL  
 ABS. MONOCYTES 0.6 0.05 - 1.2 K/UL  
 ABS. EOSINOPHILS 0.3 0.0 - 0.4 K/UL  
 ABS. BASOPHILS 0.0 0.0 - 0.1 K/UL  
 DF AUTOMATED METABOLIC PANEL, COMPREHENSIVE Collection Time: 02/07/19 10:16 AM  
Result Value Ref Range Sodium 137 136 - 145 mmol/L Potassium 4.5 3.5 - 5.5 mmol/L Chloride 102 100 - 108 mmol/L  
 CO2 31 21 - 32 mmol/L Anion gap 4 3.0 - 18 mmol/L Glucose 96 74 - 99 mg/dL BUN 15 7.0 - 18 MG/DL Creatinine 1.22 0.6 - 1.3 MG/DL  
 BUN/Creatinine ratio 12 12 - 20 GFR est AA >60 >60 ml/min/1.73m2 GFR est non-AA 58 (L) >60 ml/min/1.73m2 Calcium 9.3 8.5 - 10.1 MG/DL  Bilirubin, total 0.9 0.2 - 1.0 MG/DL  
 ALT (SGPT) 23 16 - 61 U/L  
 AST (SGOT) 20 15 - 37 U/L Alk. phosphatase 65 45 - 117 U/L Protein, total 7.5 6.4 - 8.2 g/dL Albumin 3.8 3.4 - 5.0 g/dL Globulin 3.7 2.0 - 4.0 g/dL A-G Ratio 1.0 0.8 - 1.7 LIPASE Collection Time: 02/07/19 10:16 AM  
Result Value Ref Range Lipase 213 73 - 393 U/L  
NT-PRO BNP Collection Time: 02/07/19 10:16 AM  
Result Value Ref Range NT pro-BNP 89 0 - 900 PG/ML  
CARDIAC PANEL,(CK, CKMB & TROPONIN) Collection Time: 02/07/19 10:16 AM  
Result Value Ref Range  39 - 308 U/L  
 CK - MB 2.7 <3.6 ng/ml CK-MB Index 1.8 0.0 - 4.0 % Troponin-I, QT <0.02 0.0 - 0.045 NG/ML  
EKG, 12 LEAD, INITIAL Collection Time: 02/07/19 10:51 AM  
Result Value Ref Range Ventricular Rate 75 BPM  
 Atrial Rate 75 BPM  
 P-R Interval 124 ms QRS Duration 78 ms Q-T Interval 394 ms QTC Calculation (Bezet) 439 ms Calculated P Axis 67 degrees Calculated R Axis 68 degrees Calculated T Axis 39 degrees Diagnosis Sinus rhythm with premature supraventricular complexes and premature  
ventricular complexes or fusion complexes Nonspecific T wave abnormality Abnormal ECG When compared with ECG of 15-OCT-2018 11:06, 
fusion complexes are now present 
premature supraventricular complexes are now present URINALYSIS W/ RFLX MICROSCOPIC Collection Time: 02/07/19 12:00 PM  
Result Value Ref Range Color YELLOW Appearance CLEAR Specific gravity 1.019 1.005 - 1.030    
 pH (UA) 6.0 5.0 - 8.0 Protein NEGATIVE  NEG mg/dL Glucose NEGATIVE  NEG mg/dL Ketone NEGATIVE  NEG mg/dL Bilirubin NEGATIVE  NEG Blood NEGATIVE  NEG Urobilinogen 0.2 0.2 - 1.0 EU/dL Nitrites NEGATIVE  NEG Leukocyte Esterase NEGATIVE  NEG    
 
. CXR: COPD otherwise NAD 
 
MDM Number of Diagnoses or Management Options Diagnosis management comments: I will evalute patients respirotory complaints with CXR, labs, and EKG for pneumonia or CHF. I will r/o CHF or DVT with US and labs. Amount and/or Complexity of Data Reviewed Clinical lab tests: ordered and reviewed Tests in the radiology section of CPT®: ordered and reviewed Tests in the medicine section of CPT®: ordered and reviewed (NSR 75bpm, qtc 439, normal axis, No ST elevations) 12:00 CXR neg will give albuterol and decadron for dyspnea with concern of bronchitis. 13:40 Patient has improvement of symptoms. Dispo: Discharge home Diagnosis: ICD-10-CM ICD-9-CM 1. COPD with acute exacerbation ([facility] [place] [address] J44.1 491.21  
2. Edema extremities R60.0 782.3 3. Dysuria R30.0 788. 1

## 2019-02-07 NOTE — DISCHARGE INSTRUCTIONS
Patient Education        COPD Exacerbation Plan: Care Instructions  Your Care Instructions    If you have chronic obstructive pulmonary disease (COPD), your usual shortness of breath could suddenly get worse. You may start coughing more and have more mucus. This flare-up is called a COPD exacerbation (say \"[id number]\"). A lung infection or air pollution could set off an exacerbation. Sometimes it can happen after a quick change in temperature or being around chemicals. Work with your doctor to make a plan for dealing with an exacerbation. You can better manage it if you plan ahead. Follow-up care is a key part of your treatment and safety. Be sure to make and go to all appointments, and call your doctor if you are having problems. It's also a good idea to know your test results and keep a list of the medicines you take. How can you care for yourself at home? During an exacerbation  · Do not panic if you start to have one. Quick treatment at home may help you prevent serious breathing problems. If you have a COPD exacerbation plan that you developed with your doctor, follow it. · Take your medicines exactly as your doctor tells you.  ? Use your inhaler as directed by your doctor. If your symptoms do not get better after you use your medicine, have someone take you to the emergency room. Call an ambulance if necessary. ? With inhaled medicines, a spacer or a nebulizer may help you get more medicine to your lungs. Ask your doctor or pharmacist how to use them properly. Practice using the spacer in front of a mirror before you have an exacerbation. This may help you get the medicine into your lungs quickly. ? If your doctor has given you steroid pills, take them as directed. ? Your doctor may have given you a prescription for antibiotics, which you can fill if you need it. ? Talk to your doctor if you have any problems with your medicine.  And call your doctor if you have to use your antibiotic or steroid pills. Preventing an exacerbation  · Do not smoke. This is the most important step you can take to prevent more damage to your lungs and prevent problems. If you already smoke, it is never too late to stop. If you need help quitting, talk to your doctor about stop-smoking programs and medicines. These can increase your chances of quitting for good. · Take your daily medicines as prescribed. · Avoid colds and flu. ? Get a pneumococcal vaccine. ? Get a flu vaccine each year, as soon as it is available. Ask those you live or work with to do the same, so they will not get the flu and infect you. ? Try to stay away from people with colds or the flu. ? Wash your hands often. · Avoid secondhand smoke; air pollution; cold, dry air; hot, humid air; and high altitudes. Stay at home with your windows closed when air pollution is bad. · Learn breathing techniques for COPD, such as breathing through pursed lips. These techniques can help you breathe easier during an exacerbation. When should you call for help? Call 911 anytime you think you may need emergency care. For example, call if:    · You have severe trouble breathing.     · You have severe chest pain.    Call your doctor now or seek immediate medical care if:    · You have new or worse shortness of breath.     · You develop new chest pain.     · You are coughing more deeply or more often, especially if you notice more mucus or a change in the color of your mucus.     · You cough up blood.     · You have new or increased swelling in your legs or belly.     · You have a fever.    Watch closely for changes in your health, and be sure to contact your doctor if:    · You need to use your antibiotic or steroid pills.     · Your symptoms are getting worse. Where can you learn more? Go to http://hugo-sherley.info/. Enter N286 in the search box to learn more about \"COPD Exacerbation Plan: Care Instructions. \"  Current as of: September 5, 2018  Content Version: 11.9  © 6313-7429 [employer]. Care instructions adapted under license by [employer] (which disclaims liability or warranty for this information). If you have questions about a medical condition or this instruction, always ask your healthcare professional. [address] any warranty or liability for your use of this information.

## 2019-02-12 ENCOUNTER — OFFICE VISIT (OUTPATIENT)
Dept: CARDIOLOGY CLINIC | Age: 74
End: 2019-02-12

## 2019-02-12 VITALS
BODY MASS INDEX: 23.1 KG/M2 | HEIGHT: 74 IN | WEIGHT: 180 LBS | SYSTOLIC BLOOD PRESSURE: 114 MMHG | DIASTOLIC BLOOD PRESSURE: 64 MMHG | HEART RATE: 61 BPM

## 2019-02-12 DIAGNOSIS — E78.5 DYSLIPIDEMIA: ICD-10-CM

## 2019-02-12 DIAGNOSIS — I51.9 LV DYSFUNCTION: ICD-10-CM

## 2019-02-12 DIAGNOSIS — I73.9 PAD (PERIPHERAL ARTERY DISEASE) (HCC): ICD-10-CM

## 2019-02-12 DIAGNOSIS — I25.118 CORONARY ARTERY DISEASE OF NATIVE ARTERY OF NATIVE HEART WITH STABLE ANGINA PECTORIS (HCC): ICD-10-CM

## 2019-02-12 DIAGNOSIS — I50.32 CHRONIC DIASTOLIC HEART FAILURE (HCC): Primary | ICD-10-CM

## 2019-02-12 DIAGNOSIS — I10 BENIGN ESSENTIAL HTN: ICD-10-CM

## 2019-02-12 RX ORDER — KETOCONAZOLE 20 MG/G
CREAM TOPICAL DAILY
COMMUNITY
End: 2019-06-04

## 2019-02-12 NOTE — PROGRESS NOTES
HISTORY OF PRESENT ILLNESS  Sheila Martin is a 75 y.o. male. CHF   The history is provided by the patient. This is a chronic problem. The current episode started more than 1 week ago. The problem occurs daily. The problem has been gradually improving. Associated symptoms include shortness of breath. Pertinent negatives include no chest pain. Claudication   The history is provided by the patient. This is a chronic problem. The current episode started more than 1 week ago. The problem occurs daily. The problem has been gradually improving. Associated symptoms include shortness of breath. Pertinent negatives include no chest pain. The symptoms are aggravated by exertion and walking. The symptoms are relieved by rest. He has tried rest for the symptoms. Review of Systems   Constitutional: Negative for chills and weight loss. HENT: Negative for hearing loss. Eyes: Negative for blurred vision. Respiratory: Positive for shortness of breath. Negative for stridor. Cardiovascular: Positive for claudication. Negative for chest pain. Gastrointestinal: Negative for heartburn. Musculoskeletal: Negative for myalgias. Neurological: Negative for tingling, tremors, focal weakness and loss of consciousness. Psychiatric/Behavioral: Negative for depression and suicidal ideas.      Family History   Problem Relation Age of Onset    Heart Attack Neg Hx     Stroke Neg Hx        Past Medical History:   Diagnosis Date    Benign essential HTN 2/17/2016    Dyslipidemia 1/24/2017       Past Surgical History:   Procedure Laterality Date    HX CHOLECYSTECTOMY      HX GI      HX HERNIA REPAIR Bilateral        Social History     Tobacco Use    Smoking status: Never Smoker    Smokeless tobacco: Never Used   Substance Use Topics    Alcohol use: No       No Known Allergies    Outpatient Medications Marked as Taking for the 2/12/19 encounter (Office Visit) with [place] MD Elvira   Medication Sig Dispense Refill    ketoconazole (NIZORAL) 2 % topical cream Apply  to affected area daily.  albuterol (PROVENTIL HFA, VENTOLIN HFA, PROAIR HFA) 90 mcg/actuation inhaler Take 2 Puffs by inhalation every four (4) hours as needed for Wheezing or Shortness of Breath for up to 5 days. 1 Inhaler 0    carvedilol (COREG) 3.125 mg tablet Take 1 Tab by mouth two (2) times daily (with meals). 60 Tab 6    ibuprofen (MOTRIN) 800 mg tablet Take 800 mg by mouth two (2) times daily as needed for Pain (knee and leg pain).  mirabegron ER (MYRBETRIQ) 25 mg ER tablet Take 25 mg by mouth daily.  cholecalciferol (VITAMIN D3) 1,000 unit tablet Take  by mouth daily.  simvastatin (ZOCOR) 20 mg tablet Take 0.5 Tabs by mouth nightly. 45 Tab 2    nitroglycerin (NITROSTAT) 0.4 mg SL tablet by SubLINGual route every five (5) minutes as needed for Chest Pain.  furosemide (LASIX) 40 mg tablet Take 1 Tab by mouth two (2) times a day. (Patient taking differently: Take 40 mg by mouth as needed.) 60 Tab 4        Visit Vitals  /64   Pulse 61   Ht 6' 2\" (1.88 m)   Wt 81.6 kg (180 lb)   BMI 23.11 kg/m²     Physical Exam   Constitutional: He is oriented to person, place, and time. He appears well-developed and well-nourished. No distress. HENT:   Head: Atraumatic. Mouth/Throat: No oropharyngeal exudate. Eyes: Conjunctivae are normal. No scleral icterus. Neck: Neck supple. No JVD present. Cardiovascular: Normal rate and regular rhythm. Exam reveals no gallop. No murmur heard. Pulmonary/Chest: Effort normal and breath sounds normal. No stridor. He has no wheezes. He has no rales. Abdominal: Soft. There is no tenderness. There is no rebound. Musculoskeletal: Normal range of motion. He exhibits edema (mild edema). Neurological: He is alert and oriented to person, place, and time. He exhibits normal muscle tone. Skin: Skin is warm. He is not diaphoretic. Psychiatric: He has a normal mood and affect.  His behavior is normal.     6/16 Cardiac Cath  FINDINGS:  1. Left main is patent and bifurcates into left anterior descending artery  and circumflex artery. 2. Left anterior descending artery has mild ectasia with moderate to severe  stenosis in the proximal portion. It appears to be a napkin ring type  lesion. By IVUS imaging, we obtained a stenosis of 62% with an area of 3.7  mm2. Mid to distal left anterior descending artery had wall irregularities  with sluggish flow, suggestive of severe microvascular disease. Apical LAD  had wall irregularities. 3. Diagonal 1 and diagonal 2 artery appeared to be small caliber vessel  with wall irregularities. 4. Circumflex artery is codominant with sluggish flow consistent with  microvascular disease. 5. Right coronary artery is codominant with sluggish flow suggestive of  microvascular disease. 6. Left ventricular end-diastolic pressure was 13 mmHg.     CONCLUSION: Single-vessel coronary artery disease.  Left anterior  descending artery stenosis was 62% in the proximal portion by intravascular  ultrasound imaging. Normal left ventricular and diastolic pressure. The  patient is to be on intense medical management and risk factor  Modification. 02/01/19   ECHO ADULT COMPLETE 02/04/2019 2/4/2019    Narrative · Left ventricular mildly decreased systolic function. Estimated left   ventricular ejection fraction is 46 - 50%. Left ventricular global   hypokinesis. Mild (grade 1) left ventricular diastolic dysfunction. · Right atrial cavity size is mildly dilated. · Mild aortic valve regurgitation is present. · Mitral valve thickening. Mild mitral valve regurgitation. · Mild tricuspid valve regurgitation is present. There is no evidence of   pulmonary hypertension. · Mild pulmonic valve regurgitation is present. Signed by: Álvaro Balderrama MD       ASSESSMENT and PLAN    ICD-10-CM ICD-9-CM    1. Chronic diastolic heart failure (HCC) I50.32 428.32    2.  Dyslipidemia E78.5 272.4    3. LV dysfunction I51.9 429.9    4. Coronary artery disease of native artery of native heart with stable angina pectoris ([facility] [address] I25.118 414.01      413.9    5. Benign essential HTN I10 401.1    6. PAD (peripheral artery disease) ([facility]) I73.9 443.9      No orders of the defined types were placed in this encounter. Follow-up Disposition:  Return in about 4 months (around 6/12/2019). Patient with LV dysfunction/ abnormal stress test-- had cardiac cath-62% LAD stenosis by IVUS imaging. Patient seen for follow-up after echocardiogram and nuclear stress test.  Echo report revealed EF of 45-50% with no significant valvular heart disease. Stress test shows fixed defect with no reversible ischemia. Currently takes Coreg 3.125 mg twice daily and Lasix daily. Advised to take aspirin 81 mg daily. Will consider starting ACE inhibitors in the next appointment. Is currently not on it due to borderline low blood pressure. All questions answered follow-up in 4 months.

## 2019-02-12 NOTE — PROGRESS NOTES
1. Have you been to the ER, urgent care clinic since your last visit? Hospitalized since your last visit? Yes When: 02/19 Where: [facility] Reason for visit: Difficulty breathing/swallowing    2. Have you seen or consulted any other health care providers outside of the [address] [place] [address] since your last visit? Include any pap smears or colon screening.  Yes Where: PCP Reason for visit: Routine visit

## 2019-05-15 ENCOUNTER — HOSPITAL ENCOUNTER (EMERGENCY)
Age: 74
Discharge: HOME | End: 2019-05-15
Attending: EMERGENCY MEDICINE
Payer: MEDICARE

## 2019-05-15 ENCOUNTER — APPOINTMENT (OUTPATIENT)
Dept: GENERAL RADIOLOGY | Age: 74
End: 2019-05-15
Attending: EMERGENCY MEDICINE
Payer: MEDICARE

## 2019-05-15 VITALS
HEART RATE: 86 BPM | WEIGHT: 180 LBS | HEIGHT: 74 IN | RESPIRATION RATE: 16 BRPM | TEMPERATURE: 96.3 F | BODY MASS INDEX: 23.1 KG/M2 | OXYGEN SATURATION: 100 %

## 2019-05-15 DIAGNOSIS — M25.461 KNEE EFFUSION, RIGHT: ICD-10-CM

## 2019-05-15 DIAGNOSIS — R05.9 COUGH: Primary | ICD-10-CM

## 2019-05-15 DIAGNOSIS — R35.0 URINARY FREQUENCY: ICD-10-CM

## 2019-05-15 LAB
ALBUMIN SERPL-MCNC: 3.5 G/DL (ref 3.4–5)
ALBUMIN/GLOB SERPL: 1 {RATIO} (ref 0.8–1.7)
ALP SERPL-CCNC: 57 U/L (ref 45–117)
ALT SERPL-CCNC: 18 U/L (ref 16–61)
ANION GAP SERPL CALC-SCNC: 3 MMOL/L (ref 3–18)
APPEARANCE UR: CLEAR
AST SERPL-CCNC: 13 U/L (ref 15–37)
ATRIAL RATE: 62 BPM
BASOPHILS # BLD: 0 K/UL (ref 0–0.1)
BASOPHILS NFR BLD: 1 % (ref 0–2)
BILIRUB SERPL-MCNC: 0.9 MG/DL (ref 0.2–1)
BILIRUB UR QL: NEGATIVE
BUN SERPL-MCNC: 14 MG/DL (ref 7–18)
BUN/CREAT SERPL: 13 (ref 12–20)
CALCIUM SERPL-MCNC: 9.3 MG/DL (ref 8.5–10.1)
CALCULATED P AXIS, ECG09: 67 DEGREES
CALCULATED R AXIS, ECG10: 49 DEGREES
CALCULATED T AXIS, ECG11: 56 DEGREES
CHLORIDE SERPL-SCNC: 106 MMOL/L (ref 100–108)
CO2 SERPL-SCNC: 30 MMOL/L (ref 21–32)
COLOR UR: YELLOW
CREAT SERPL-MCNC: 1.12 MG/DL (ref 0.6–1.3)
DIAGNOSIS, 93000: NORMAL
DIFFERENTIAL METHOD BLD: ABNORMAL
EOSINOPHIL # BLD: 0.2 K/UL (ref 0–0.4)
EOSINOPHIL NFR BLD: 6 % (ref 0–5)
ERYTHROCYTE [DISTWIDTH] IN BLOOD BY AUTOMATED COUNT: 14.3 % (ref 11.6–14.5)
GLOBULIN SER CALC-MCNC: 3.5 G/DL (ref 2–4)
GLUCOSE SERPL-MCNC: 84 MG/DL (ref 74–99)
GLUCOSE UR STRIP.AUTO-MCNC: NEGATIVE MG/DL
HCT VFR BLD AUTO: 42.5 % (ref 36–48)
HGB BLD-MCNC: 14.4 G/DL (ref 13–16)
HGB UR QL STRIP: NEGATIVE
KETONES UR QL STRIP.AUTO: NEGATIVE MG/DL
LEUKOCYTE ESTERASE UR QL STRIP.AUTO: NEGATIVE
LIPASE SERPL-CCNC: 93 U/L (ref 73–393)
LYMPHOCYTES # BLD: 1.7 K/UL (ref 0.9–3.6)
LYMPHOCYTES NFR BLD: 45 % (ref 21–52)
MCH RBC QN AUTO: 29.2 PG (ref 24–34)
MCHC RBC AUTO-ENTMCNC: 33.9 G/DL (ref 31–37)
MCV RBC AUTO: 86.2 FL (ref 74–97)
MONOCYTES # BLD: 0.3 K/UL (ref 0.05–1.2)
MONOCYTES NFR BLD: 8 % (ref 3–10)
NEUTS SEG # BLD: 1.5 K/UL (ref 1.8–8)
NEUTS SEG NFR BLD: 40 % (ref 40–73)
NITRITE UR QL STRIP.AUTO: NEGATIVE
P-R INTERVAL, ECG05: 126 MS
PH UR STRIP: 6 [PH] (ref 5–8)
PLATELET # BLD AUTO: 217 K/UL (ref 135–420)
PMV BLD AUTO: 9.2 FL (ref 9.2–11.8)
POTASSIUM SERPL-SCNC: 4 MMOL/L (ref 3.5–5.5)
PROT SERPL-MCNC: 7 G/DL (ref 6.4–8.2)
PROT UR STRIP-MCNC: NEGATIVE MG/DL
Q-T INTERVAL, ECG07: 444 MS
QRS DURATION, ECG06: 82 MS
QTC CALCULATION (BEZET), ECG08: 450 MS
RBC # BLD AUTO: 4.93 M/UL (ref 4.7–5.5)
SODIUM SERPL-SCNC: 139 MMOL/L (ref 136–145)
SP GR UR REFRACTOMETRY: 1.02 (ref 1–1.03)
UROBILINOGEN UR QL STRIP.AUTO: 1 EU/DL (ref 0.2–1)
VENTRICULAR RATE, ECG03: 62 BPM
WBC # BLD AUTO: 3.7 K/UL (ref 4.6–13.2)

## 2019-05-15 PROCEDURE — 71045 X-RAY EXAM CHEST 1 VIEW: CPT

## 2019-05-15 PROCEDURE — 81003 URINALYSIS AUTO W/O SCOPE: CPT

## 2019-05-15 PROCEDURE — 99283 EMERGENCY DEPT VISIT LOW MDM: CPT

## 2019-05-15 PROCEDURE — 93005 ELECTROCARDIOGRAM TRACING: CPT

## 2019-05-15 PROCEDURE — 83690 ASSAY OF LIPASE: CPT

## 2019-05-15 PROCEDURE — 80053 COMPREHEN METABOLIC PANEL: CPT

## 2019-05-15 PROCEDURE — 73560 X-RAY EXAM OF KNEE 1 OR 2: CPT

## 2019-05-15 PROCEDURE — 85025 COMPLETE CBC W/AUTO DIFF WBC: CPT

## 2019-05-15 NOTE — ED TRIAGE NOTES
The patient presents for evaluation of non productive cough x \"a while\", bilateral knee swelling, and \"gas in my stomach. \"  Denies chest pain or shortness of breath.

## 2019-05-15 NOTE — DISCHARGE INSTRUCTIONS
Patient Education        Cough: Care Instructions  Your Care Instructions    A cough is your body's response to something that bothers your throat or airways. Many things can cause a cough. You might cough because of a cold or the flu, bronchitis, or asthma. Smoking, postnasal drip, allergies, and stomach acid that backs up into your throat also can cause coughs. A cough is a symptom, not a disease. Most coughs stop when the cause, such as a cold, goes away. You can take a few steps at home to cough less and feel better. Follow-up care is a key part of your treatment and safety. Be sure to make and go to all appointments, and call your doctor if you are having problems. It's also a good idea to know your test results and keep a list of the medicines you take. How can you care for yourself at home? · Drink lots of water and other fluids. This helps thin the mucus and soothes a dry or sore throat. Honey or lemon juice in hot water or tea may ease a dry cough. · Take cough medicine as directed by your doctor. · Prop up your head on pillows to help you breathe and ease a dry cough. · Try cough drops to soothe a dry or sore throat. Cough drops don't stop a cough. Medicine-flavored cough drops are no better than candy-flavored drops or hard candy. · Do not smoke. Avoid secondhand smoke. If you need help quitting, talk to your doctor about stop-smoking programs and medicines. These can increase your chances of quitting for good. When should you call for help? Call 911 anytime you think you may need emergency care.  For example, call if:    · You have severe trouble breathing.    Call your doctor now or seek immediate medical care if:    · You cough up blood.     · You have new or worse trouble breathing.     · You have a new or higher fever.     · You have a new rash.    Watch closely for changes in your health, and be sure to contact your doctor if:    · You cough more deeply or more often, especially if you notice more mucus or a change in the color of your mucus.     · You have new symptoms, such as a sore throat, an earache, or sinus pain.     · You do not get better as expected. Where can you learn more? Go to http://hugo-sherley.info/. Enter D279 in the search box to learn more about \"Cough: Care Instructions. \"  Current as of: September 5, 2018  Content Version: 11.9  © 2006-2018 [employer] Care instructions adapted under license by [employer] (which disclaims liability or warranty for this information). If you have questions about a medical condition or this instruction, always ask your healthcare professional. [place] [address] any warranty or liability for your use of this information. Patient Education        Frequent Urination: Care Instructions  Your Care Instructions  An urge to urinate frequently but usually passing only small amounts of urine is a common symptom of urinary problems, such as urinary tract infections. The bladder may become inflamed. This can cause the urge to urinate. You may try to urinate more often than usual to try to soothe that urge. Frequent urination also may be caused by sexually transmitted infections (STIs) or kidney stones. Or it may happen when something irritates the tube that carries urine from the bladder to the outside of the body (urethra). It may also be a sign of diabetes. The cause may be hard to find. You may need tests. Follow-up care is a key part of your treatment and safety. Be sure to make and go to all appointments, and call your doctor if you are having problems. It's also a good idea to know your test results and keep a list of the medicines you take. How can you care for yourself at home? · Drink extra water for the next day or two. This will help make the urine less concentrated.  (If you have kidney, heart, or liver disease and have to limit fluids, talk with your doctor before you increase the amount of fluids you drink.)  · Avoid drinks that are carbonated or have caffeine. They can irritate the bladder. For women:  · Urinate right after you have sex. · After you go to the bathroom, wipe from front to back. · Avoid douches, bubble baths, and feminine hygiene sprays. And avoid other feminine hygiene products that have deodorants. When should you call for help? Call your doctor now or seek immediate medical care if:    · You have new symptoms, such as fever, nausea, or vomiting.     · You have new or worse symptoms of a urinary problem. For example:  ? You have blood or pus in your urine. ? You have chills or body aches. ? It hurts to urinate. ? You have groin or belly pain. ? You have pain in your back just below your rib cage (the flank area).    Watch closely for changes in your health, and be sure to contact your doctor if you feel thirstier than usual.  Where can you learn more? Go to http://hugo-sherley.info/. Enter 916 0779 in the search box to learn more about \"Frequent Urination: Care Instructions. \"  Current as of: March 20, 2018  Content Version: 11.9  © 4284-7222 [employer]. Care instructions adapted under license by [employer] (which disclaims liability or warranty for this information). If you have questions about a medical condition or this instruction, always ask your healthcare professional. [address] any warranty or liability for your use of this information.

## 2019-05-15 NOTE — ED PROVIDER NOTES
EMERGENCY DEPARTMENT HISTORY AND PHYSICAL EXAM 
This was created with voice recognition software and transcription errors may be present. 9:10 AM 
Date: 5/15/2019 Patient Name: Arash  History of Presenting Illness Chief Complaint: 
 
History Provided By:  
 
HPI: Arash  is a 75 y.o. male with a past medical history of abdominal pain hypertension dyslipidemia reflux heartburn high cholesterol incontinence and swelling of both lower extremities who presents with right knee swelling as well as bilateral inguinal pain patient states is been both of them have been going on for some time. No acute injury to either. No nausea no vomiting no sick diarrhea no chest pain no shortness of breath. Per triage also not having occasional cough. He is followed by his primary care doctor Dr. Ce Breaux but I do not see him seeing an orthopedist for his knee pain. He notes that he has had additionally some increased urination with burning that has been evaluated by urology with no clear etiology. PCP: Janay Stevens MD 
 
 
Past History Past Medical History: 
Past Medical History:  
Diagnosis Date  Abdominal pain  Benign essential HTN 2/17/2016  Dyslipidemia 1/24/2017  Esophageal reflux  Heartburn  High cholesterol  Incontinence  Swelling of both lower extremities Past Surgical History: 
Past Surgical History:  
Procedure Laterality Date  HX CHOLECYSTECTOMY  HX GI    
 HX HERNIA REPAIR Bilateral   
 HX TONSILLECTOMY Family History: 
Family History Problem Relation Age of Onset  Heart Attack Neg Hx  Stroke Neg Hx Social History: 
Social History Tobacco Use  Smoking status: Never Smoker  Smokeless tobacco: Never Used Substance Use Topics  Alcohol use: No  
 Drug use: No  
 
 
Allergies: 
No Known Allergies Review of Systems Review of Systems All other systems reviewed and are negative. 10 point review of systems otherwise negative unless noted in HPI. Physical Exam  
 
 
Physical Exam  
Constitutional: He is oriented to person, place, and time. He appears well-developed. HENT:  
Head: Normocephalic and atraumatic. Eyes: Pupils are equal, round, and reactive to light. EOM are normal.  
Neck: Normal range of motion. Neck supple. Cardiovascular: Normal rate, regular rhythm and normal heart sounds. Exam reveals no friction rub. No murmur heard. Pulmonary/Chest: Effort normal and breath sounds normal. No respiratory distress. He has no wheezes. Abdominal: Soft. He exhibits no distension. There is no tenderness. There is no rebound and no guarding. Negative for hernia bilaterally Soft nontender nondistended. Musculoskeletal: Normal range of motion. Large right effusion without laceration or abrasion Neurological: He is alert and oriented to person, place, and time. Skin: Skin is warm and dry. Psychiatric: He has a normal mood and affect. His behavior is normal. Thought content normal.  
 
 
Diagnostic Study Results Vital Signs Visit Vitals Pulse 86 Temp 96.3 °F (35.7 °C) Resp 16 Ht 6' 2\" (1.88 m) Wt 81.6 kg (180 lb) SpO2 94% BMI 23.11 kg/m² EKG: EKG shows a normal sinus rhythm with a rate of 62 with a normal axis and normal intervals there is no ST elevation or depression and no hypertrophy Labs:  
Imaging:  
 
Medical Decision Making ED Course: Progress Notes, Reevaluation, and Consults: 
 
 
Provider Notes (Medical Decision Making): Patient is patient presents with multiple complaints overall etiology is unclear. #1 urinary urgency and dysuria its been going on for some time he has had an evaluation by urology I will send a. 
2.  Knee swelling. This is been going on by chart as far as I can tell for years. I will check an x-ray and refer to orthopedics. #3 slight cough also check an x-ray. #4 abdominal pain.   Exam is benign I do not think he needs any imaging we will check basic labs. Labs wnl  
UA neg 
cxr : IMPRESSION: 
  
No significant interval change, no radiographic finding for an acute 
cardiopulmonary process Right knee: IMPRESSION: 
  
Tricompartmental arthritic change, chondrocalcinosis and large effusion. Finding 
may represent that of osteoarthritis versus possible CPPD arthropathy. 1. Swollen knee: chronic; refer to orhto 2 .urinary urgency; UA neg; followed by Uro; refer outpt. 3. Cough: cxr neg. No coughing here; follow outpt Diagnosis Clinical Impression: No diagnosis found. Disposition: 
 
Patient's Medications Start Taking No medications on file Continue Taking ACETAMINOPHEN (TYLENOL) 325 MG TABLET    Take 2 Tabs by mouth every four (4) hours as needed for Pain. ARGININE, L-ARGININE, PO    Take  by mouth. ASPIRIN 81 MG CHEWABLE TABLET    Take 325 mg by mouth daily. BACLOFEN (LIORESAL) 20 MG TABLET    Take 20 mg by mouth two (2) times daily as needed. CARVEDILOL (COREG) 3.125 MG TABLET    Take 1 Tab by mouth two (2) times daily (with meals). CHOLECALCIFEROL (VITAMIN D3) 1,000 UNIT TABLET    Take  by mouth daily. CILOSTAZOL (PLETAL) 50 MG TABLET    Take  by mouth Before breakfast and dinner. CITALOPRAM (CELEXA) 20 MG TABLET    Take 20 mg by mouth daily. COMP. [address] [place] [address] Package by Does Not Apply route daily. FUROSEMIDE (LASIX) 40 MG TABLET    Take 1 Tab by mouth two (2) times a day. GABAPENTIN (NEURONTIN) 100 MG CAPSULE    Take 100 mg by mouth nightly. IBUPROFEN (MOTRIN) 800 MG TABLET    Take 800 mg by mouth two (2) times daily as needed for Pain (knee and leg pain). KETOCONAZOLE (NIZORAL) 2 % TOPICAL CREAM    Apply  to affected area daily. LISINOPRIL (PRINIVIL, ZESTRIL) 2.5 MG TABLET    Take 1 Tab by mouth daily. MELOXICAM (MOBIC) 15 MG TABLET    Take 15 mg by mouth daily. MIRABEGRON ER (MYRBETRIQ) 25 MG ER TABLET    Take 25 mg by mouth daily. NITROGLYCERIN (NITROSTAT) 0.4 MG SL TABLET    by SubLINGual route every five (5) minutes as needed for Chest Pain. OMEPRAZOLE (PRILOSEC) 20 MG CAPSULE    Take 1 Cap by mouth daily. PENTOXIFYLLINE CR (TRENTAL) 400 MG CR TABLET    Take 1 Tab by mouth three (3) times daily (with meals). SILDENAFIL CITRATE (VIAGRA) 100 MG TABLET    Take 100 mg by mouth as needed. SIMVASTATIN (ZOCOR) 20 MG TABLET    Take 0.5 Tabs by mouth nightly. These Medications have changed No medications on file Stop Taking No medications on file

## 2019-06-11 ENCOUNTER — OFFICE VISIT (OUTPATIENT)
Dept: CARDIOLOGY CLINIC | Age: 74
End: 2019-06-11

## 2019-06-11 VITALS
SYSTOLIC BLOOD PRESSURE: 92 MMHG | HEART RATE: 66 BPM | DIASTOLIC BLOOD PRESSURE: 63 MMHG | WEIGHT: 183.6 LBS | BODY MASS INDEX: 23.56 KG/M2 | HEIGHT: 74 IN

## 2019-06-11 DIAGNOSIS — E78.5 DYSLIPIDEMIA: ICD-10-CM

## 2019-06-11 DIAGNOSIS — I51.9 LV DYSFUNCTION: ICD-10-CM

## 2019-06-11 DIAGNOSIS — I25.118 CORONARY ARTERY DISEASE OF NATIVE ARTERY OF NATIVE HEART WITH STABLE ANGINA PECTORIS (HCC): ICD-10-CM

## 2019-06-11 DIAGNOSIS — I10 BENIGN ESSENTIAL HTN: ICD-10-CM

## 2019-06-11 DIAGNOSIS — I50.32 CHRONIC DIASTOLIC HEART FAILURE (HCC): Primary | ICD-10-CM

## 2019-06-11 DIAGNOSIS — I73.9 PAD (PERIPHERAL ARTERY DISEASE) (HCC): ICD-10-CM

## 2019-06-11 RX ORDER — FUROSEMIDE 40 MG/1
40 TABLET ORAL 2 TIMES DAILY
COMMUNITY
End: 2019-12-10 | Stop reason: SDUPTHER

## 2019-06-11 RX ORDER — SILDENAFIL 100 MG/1
100 TABLET, FILM COATED ORAL AS NEEDED
COMMUNITY
End: 2021-09-15

## 2019-06-11 RX ORDER — SIMVASTATIN 20 MG/1
TABLET, FILM COATED ORAL
COMMUNITY
End: 2019-10-01 | Stop reason: SDUPTHER

## 2019-06-11 RX ORDER — ALBUTEROL SULFATE 90 UG/1
AEROSOL, METERED RESPIRATORY (INHALATION)
COMMUNITY
End: 2021-09-15

## 2019-06-11 RX ORDER — CARVEDILOL 3.12 MG/1
TABLET ORAL 2 TIMES DAILY WITH MEALS
Status: ON HOLD | COMMUNITY
End: 2019-10-17 | Stop reason: SDUPTHER

## 2019-06-11 NOTE — PROGRESS NOTES
1. Have you been to the ER, urgent care clinic since your last visit? Hospitalized since your last visit? Yes When: 05/2019 Where: [facility] Reason for visit: Urinary Issues/Shortness of breath    2. Have you seen or consulted any other health care providers outside of the [address] [place] [address] since your last visit? Include any pap smears or colon screening.  No

## 2019-06-11 NOTE — PROGRESS NOTES
HISTORY OF PRESENT ILLNESS  Uri Shah is a 75 y.o. male. CHF   The history is provided by the patient. This is a chronic problem. The current episode started more than 1 week ago. The problem occurs daily. The problem has been gradually improving. Claudication   The history is provided by the patient. This is a chronic problem. The current episode started more than 1 week ago. The problem occurs daily. The problem has been gradually improving. The symptoms are aggravated by exertion and walking. The symptoms are relieved by rest. He has tried rest for the symptoms. Review of Systems   Constitutional: Negative for chills and weight loss. HENT: Negative for hearing loss. Eyes: Negative for blurred vision. Respiratory: Negative for stridor. Cardiovascular: Positive for claudication. Gastrointestinal: Negative for heartburn. Musculoskeletal: Negative for myalgias. Neurological: Negative for tingling, tremors, focal weakness and loss of consciousness. Psychiatric/Behavioral: Negative for depression and suicidal ideas. Family History   Problem Relation Age of Onset    Heart Attack Neg Hx     Stroke Neg Hx        Past Medical History:   Diagnosis Date    Abdominal pain     Benign essential HTN 2/17/2016    Dyslipidemia 1/24/2017    Esophageal reflux     Heartburn     High cholesterol     Incontinence     Swelling of both lower extremities        Past Surgical History:   Procedure Laterality Date    HX CHOLECYSTECTOMY      HX GI      HX HERNIA REPAIR Bilateral     HX TONSILLECTOMY         Social History     Tobacco Use    Smoking status: Never Smoker    Smokeless tobacco: Never Used   Substance Use Topics    Alcohol use: No       No Known Allergies    Outpatient Medications Marked as Taking for the 6/11/19 encounter (Office Visit) with Gail Orantes MD   Medication Sig Dispense Refill    simvastatin (ZOCOR) 20 mg tablet Take  by mouth nightly.       furosemide (LASIX) 40 mg tablet Take  by mouth daily.  sildenafil citrate (VIAGRA) 100 mg tablet Take 100 mg by mouth as needed.  carvedilol (COREG) 3.125 mg tablet Take  by mouth two (2) times daily (with meals).  albuterol (VENTOLIN HFA) 90 mcg/actuation inhaler Take  by inhalation.  tamsulosin (FLOMAX) 0.4 mg capsule Take 1 Cap by mouth daily (after dinner). 90 Cap 3    PARoxetine (PAXIL) 20 mg tablet Take  by mouth daily.  baclofen (LIORESAL) 20 mg tablet Take 20 mg by mouth two (2) times daily as needed.  meloxicam (MOBIC) 15 mg tablet Take 15 mg by mouth daily. Visit Vitals  BP 92/63   Pulse 66   Ht 6' 2\" (1.88 m)   Wt 83.3 kg (183 lb 9.6 oz)   BMI 23.57 kg/m²     Physical Exam   Constitutional: He is oriented to person, place, and time. He appears well-developed and well-nourished. No distress. HENT:   Head: Atraumatic. Mouth/Throat: No oropharyngeal exudate. Eyes: Conjunctivae are normal. No scleral icterus. Neck: Neck supple. No JVD present. Cardiovascular: Normal rate and regular rhythm. Exam reveals no gallop. No murmur heard. Pulmonary/Chest: Effort normal and breath sounds normal. No stridor. He has no wheezes. He has no rales. Abdominal: Soft. There is no tenderness. There is no rebound. Musculoskeletal: Normal range of motion. He exhibits edema (mild edema). Neurological: He is alert and oriented to person, place, and time. He exhibits normal muscle tone. Skin: Skin is warm. He is not diaphoretic. Psychiatric: He has a normal mood and affect. His behavior is normal.     6/16 Cardiac Cath  FINDINGS:  1. Left main is patent and bifurcates into left anterior descending artery  and circumflex artery. 2. Left anterior descending artery has mild ectasia with moderate to severe  stenosis in the proximal portion. It appears to be a napkin ring type  lesion. By IVUS imaging, we obtained a stenosis of 62% with an area of 3.7  mm2.  Mid to distal left anterior descending artery had wall irregularities  with sluggish flow, suggestive of severe microvascular disease. Apical LAD  had wall irregularities. 3. Diagonal 1 and diagonal 2 artery appeared to be small caliber vessel  with wall irregularities. 4. Circumflex artery is codominant with sluggish flow consistent with  microvascular disease. 5. Right coronary artery is codominant with sluggish flow suggestive of  microvascular disease. 6. Left ventricular end-diastolic pressure was 13 mmHg.     CONCLUSION: Single-vessel coronary artery disease.  Left anterior  descending artery stenosis was 62% in the proximal portion by intravascular  ultrasound imaging. Normal left ventricular and diastolic pressure. The  patient is to be on intense medical management and risk factor  Modification. 02/01/19   ECHO ADULT COMPLETE 02/04/2019 2/4/2019    Narrative · Left ventricular mildly decreased systolic function. Estimated left   ventricular ejection fraction is 46 - 50%. Left ventricular global   hypokinesis. Mild (grade 1) left ventricular diastolic dysfunction. · Right atrial cavity size is mildly dilated. · Mild aortic valve regurgitation is present. · Mitral valve thickening. Mild mitral valve regurgitation. · Mild tricuspid valve regurgitation is present. There is no evidence of   pulmonary hypertension. · Mild pulmonic valve regurgitation is present. Signed by: Emma Evans MD       ASSESSMENT and PLAN    ICD-10-CM ICD-9-CM    1. Chronic diastolic heart failure (HCC) I50.32 428.32    2. Dyslipidemia E78.5 272.4    3. LV dysfunction I51.9 429.9    4. Coronary artery disease of native artery of native heart with stable angina pectoris ([facility] [address] I25.118 414.01      413.9    5. Benign essential HTN I10 401.1    6. PAD (peripheral artery disease) (HCC) I73.9 443.9      No orders of the defined types were placed in this encounter.     Follow-up and Dispositions    · Return in about 6 months (around 12/11/2019). Patient with LV dysfunction/ abnormal stress test-- had cardiac cath-62% LAD stenosis by IVUS imaging. Patient seen for follow-up after echocardiogram and nuclear stress test.  Echo report revealed EF of 45-50% with no significant valvular heart disease. Stress test shows fixed defect with no reversible ischemia. Currently takes Coreg 3.125 mg twice daily and Lasix daily. Advised to take aspirin 81 mg daily. Currently not on ACE-i due to borderline low blood pressure.   F/u in 6 months

## 2019-10-01 ENCOUNTER — OFFICE VISIT (OUTPATIENT)
Dept: CARDIOLOGY CLINIC | Age: 74
End: 2019-10-01

## 2019-10-01 VITALS
WEIGHT: 195 LBS | SYSTOLIC BLOOD PRESSURE: 119 MMHG | HEIGHT: 74 IN | DIASTOLIC BLOOD PRESSURE: 75 MMHG | HEART RATE: 87 BPM | BODY MASS INDEX: 25.03 KG/M2

## 2019-10-01 DIAGNOSIS — E78.5 DYSLIPIDEMIA: ICD-10-CM

## 2019-10-01 DIAGNOSIS — I25.118 CORONARY ARTERY DISEASE OF NATIVE ARTERY OF NATIVE HEART WITH STABLE ANGINA PECTORIS (HCC): Primary | ICD-10-CM

## 2019-10-01 DIAGNOSIS — I73.9 PAD (PERIPHERAL ARTERY DISEASE) (HCC): ICD-10-CM

## 2019-10-01 DIAGNOSIS — I51.9 LV DYSFUNCTION: ICD-10-CM

## 2019-10-01 DIAGNOSIS — I50.32 CHRONIC DIASTOLIC HEART FAILURE (HCC): ICD-10-CM

## 2019-10-01 DIAGNOSIS — I10 BENIGN ESSENTIAL HTN: ICD-10-CM

## 2019-10-01 PROBLEM — I25.119 ANGINA CONCURRENT WITH AND DUE TO ARTERIOSCLEROSIS OF CORONARY ARTERY (HCC): Status: ACTIVE | Noted: 2019-10-01

## 2019-10-01 RX ORDER — METOCLOPRAMIDE 10 MG/1
10 TABLET ORAL
COMMUNITY
End: 2021-09-15

## 2019-10-01 RX ORDER — ASPIRIN 81 MG/1
TABLET ORAL DAILY
COMMUNITY

## 2019-10-01 RX ORDER — SIMVASTATIN 20 MG/1
20 TABLET, FILM COATED ORAL
Qty: 30 TAB | Refills: 6 | Status: SHIPPED | OUTPATIENT
Start: 2019-10-01 | End: 2019-11-13 | Stop reason: SDUPTHER

## 2019-10-01 RX ORDER — CARVEDILOL 3.12 MG/1
3.12 TABLET ORAL 2 TIMES DAILY WITH MEALS
Qty: 60 TAB | Refills: 6 | Status: SHIPPED | OUTPATIENT
Start: 2019-10-01 | End: 2020-12-15

## 2019-10-01 NOTE — PROGRESS NOTES
1. Have you been to the ER, urgent care clinic since your last visit? Hospitalized since your last visit? No    2. Have you seen or consulted any other health care providers outside of the [address] [place] [address] since your last visit? Include any pap smears or colon screening.  No

## 2019-10-01 NOTE — PROGRESS NOTES
HISTORY OF PRESENT ILLNESS  Eric Jaramillo is a 75 y.o. male. CHF   The history is provided by the patient. This is a chronic problem. The current episode started more than 1 week ago. The problem occurs daily. The problem has not changed since onset. Associated symptoms include shortness of breath. Claudication   The history is provided by the patient. This is a chronic problem. The current episode started more than 1 week ago. The problem occurs daily. The problem has been gradually improving. Associated symptoms include shortness of breath. The symptoms are aggravated by exertion and walking. The symptoms are relieved by rest. He has tried rest for the symptoms. Shortness of Breath   The history is provided by the patient. This is a chronic problem. The problem occurs frequently. The problem has been gradually worsening. Associated symptoms include claudication. Pertinent negatives include no PND and no orthopnea. Review of Systems   Constitutional: Negative for chills and weight loss. HENT: Negative for hearing loss. Eyes: Negative for blurred vision. Respiratory: Positive for shortness of breath. Negative for stridor. Cardiovascular: Positive for claudication. Negative for orthopnea and PND. Gastrointestinal: Negative for heartburn. Musculoskeletal: Negative for myalgias. Neurological: Negative for tingling, tremors, focal weakness and loss of consciousness. Psychiatric/Behavioral: Negative for depression and suicidal ideas.      Family History   Problem Relation Age of Onset    Heart Attack Neg Hx     Stroke Neg Hx        Past Medical History:   Diagnosis Date    Abdominal pain     Benign essential HTN 2/17/2016    Dyslipidemia 1/24/2017    Esophageal reflux     Heartburn     High cholesterol     Incontinence     Prostate cancer screening     Swelling of both lower extremities     Urine incontinence        Past Surgical History:   Procedure Laterality Date    HX CHOLECYSTECTOMY      HX GI      HX HERNIA REPAIR Bilateral     HX TONSILLECTOMY         Social History     Tobacco Use    Smoking status: Never Smoker    Smokeless tobacco: Never Used   Substance Use Topics    Alcohol use: No       No Known Allergies    Outpatient Medications Marked as Taking for the 10/1/19 encounter (Office Visit) with Marylene Gilbert, MD   Medication Sig Dispense Refill    metoclopramide HCl (REGLAN) 10 mg tablet Take 10 mg by mouth Before breakfast, lunch, dinner and at bedtime.  aspirin delayed-release 81 mg tablet Take  by mouth daily.  simvastatin (ZOCOR) 20 mg tablet Take 1 Tab by mouth nightly. 30 Tab 6    carvedilol (COREG) 3.125 mg tablet Take 1 Tab by mouth two (2) times daily (with meals). 60 Tab 6    trospium (SANCTURA) 20 mg tablet Take 1 Tab by mouth two (2) times a day. 60 Tab 11    furosemide (LASIX) 40 mg tablet Take  by mouth daily.  albuterol (VENTOLIN HFA) 90 mcg/actuation inhaler Take  by inhalation.  tamsulosin (FLOMAX) 0.4 mg capsule Take 1 Cap by mouth daily (after dinner). 90 Cap 3    baclofen (LIORESAL) 20 mg tablet Take 20 mg by mouth two (2) times daily as needed.  meloxicam (MOBIC) 15 mg tablet Take 15 mg by mouth daily. Visit Vitals  /75   Pulse 87   Ht 6' 2\" (1.88 m)   Wt 88.5 kg (195 lb)   BMI 25.04 kg/m²     Physical Exam   Constitutional: He is oriented to person, place, and time. He appears well-developed and well-nourished. No distress. HENT:   Head: Atraumatic. Mouth/Throat: No oropharyngeal exudate. Eyes: Conjunctivae are normal. No scleral icterus. Neck: Neck supple. No JVD present. Cardiovascular: Normal rate and regular rhythm. Exam reveals no gallop. No murmur heard. Pulmonary/Chest: Effort normal and breath sounds normal. No stridor. He has no wheezes. He has no rales. Abdominal: Soft. There is no tenderness. There is no rebound. Musculoskeletal: Normal range of motion.  He exhibits edema (mild edema). Neurological: He is alert and oriented to person, place, and time. He exhibits normal muscle tone. Skin: Skin is warm. He is not diaphoretic. Psychiatric: He has a normal mood and affect. His behavior is normal.     6/16 Cardiac Cath  FINDINGS:  1. Left main is patent and bifurcates into left anterior descending artery  and circumflex artery. 2. Left anterior descending artery has mild ectasia with moderate to severe  stenosis in the proximal portion. It appears to be a napkin ring type  lesion. By IVUS imaging, we obtained a stenosis of 62% with an area of 3.7  mm2. Mid to distal left anterior descending artery had wall irregularities  with sluggish flow, suggestive of severe microvascular disease. Apical LAD  had wall irregularities. 3. Diagonal 1 and diagonal 2 artery appeared to be small caliber vessel  with wall irregularities. 4. Circumflex artery is codominant with sluggish flow consistent with  microvascular disease. 5. Right coronary artery is codominant with sluggish flow suggestive of  microvascular disease. 6. Left ventricular end-diastolic pressure was 13 mmHg.     CONCLUSION: Single-vessel coronary artery disease.  Left anterior  descending artery stenosis was 62% in the proximal portion by intravascular  ultrasound imaging. Normal left ventricular and diastolic pressure. The  patient is to be on intense medical management and risk factor  Modification. 02/01/19   ECHO ADULT COMPLETE 02/04/2019 2/4/2019    Narrative · Left ventricular mildly decreased systolic function. Estimated left   ventricular ejection fraction is 46 - 50%. Left ventricular global   hypokinesis. Mild (grade 1) left ventricular diastolic dysfunction. · Right atrial cavity size is mildly dilated. · Mild aortic valve regurgitation is present. · Mitral valve thickening. Mild mitral valve regurgitation. · Mild tricuspid valve regurgitation is present.  There is no evidence of   pulmonary hypertension. · Mild pulmonic valve regurgitation is present. Signed by: Mariana Navarrete MD       ASSESSMENT and PLAN    ICD-10-CM ICD-9-CM    1. Coronary artery disease of native artery of native heart with stable angina pectoris ([facility]) S73.906 981.47 METABOLIC PANEL, COMPREHENSIVE     413.9 PROTHROMBIN TIME + INR      PTT      TSH 3RD GENERATION      CBC W/O DIFF      CASE REQUEST CATH LAB    [facility] 3   2. Chronic diastolic heart failure (HCC) I50.32 428.32    3. Dyslipidemia E78.5 272.4    4. LV dysfunction I51.9 429.9    5. Benign essential HTN I10 401.1    6. PAD (peripheral artery disease) ([facility]) I73.9 443.9      Orders Placed This Encounter    METABOLIC PANEL, COMPREHENSIVE     Standing Status:   Future     Standing Expiration Date:   10/1/2020    PROTHROMBIN TIME + INR     Standing Status:   Future     Standing Expiration Date:   10/1/2020    PTT     Standing Status:   Future     Standing Expiration Date:   10/1/2020    TSH 3RD GENERATION     Standing Status:   Future     Standing Expiration Date:   10/1/2020    CBC W/O DIFF     Standing Status:   Future     Standing Expiration Date:   10/1/2020    simvastatin (ZOCOR) 20 mg tablet     Sig: Take 1 Tab by mouth nightly. Dispense:  30 Tab     Refill:  6    carvedilol (COREG) 3.125 mg tablet     Sig: Take 1 Tab by mouth two (2) times daily (with meals). Dispense:  60 Tab     Refill:  6     Follow-up and Dispositions    · Return in about 1 month (around 11/1/2019). Patient with LV dysfunction/ abnormal stress test-- had cardiac cath-62% LAD stenosis by IVUS imaging. Patient seen for follow-up after echocardiogram and nuclear stress test.  Echo report revealed EF of 45-50% with no significant valvular heart disease. Stress test shows fixed defect with no reversible ischemia. Seen for follow-up. Complains of worsening exertional dyspnea unit mild to moderate exertion. Patient failed to refill Coreg.   Will send in prescription. Due to worsening symptoms we will proceed with coronary angiogram to evaluate CAD. Follow-up post procedure.

## 2019-10-11 ENCOUNTER — HOSPITAL ENCOUNTER (OUTPATIENT)
Dept: PREADMISSION TESTING | Age: 74
Discharge: HOME | End: 2019-10-11
Payer: MEDICARE

## 2019-10-11 DIAGNOSIS — I25.118 CORONARY ARTERY DISEASE OF NATIVE ARTERY OF NATIVE HEART WITH STABLE ANGINA PECTORIS (HCC): ICD-10-CM

## 2019-10-11 LAB
ALBUMIN SERPL-MCNC: 3.3 G/DL (ref 3.4–5)
ALBUMIN/GLOB SERPL: 1.1 {RATIO} (ref 0.8–1.7)
ALP SERPL-CCNC: 62 U/L (ref 45–117)
ALT SERPL-CCNC: 18 U/L (ref 16–61)
ANION GAP SERPL CALC-SCNC: 3 MMOL/L (ref 3–18)
APTT PPP: 29.2 SEC (ref 23–36.4)
AST SERPL-CCNC: 15 U/L (ref 10–38)
BILIRUB SERPL-MCNC: 0.8 MG/DL (ref 0.2–1)
BUN SERPL-MCNC: 12 MG/DL (ref 7–18)
BUN/CREAT SERPL: 10 (ref 12–20)
CALCIUM SERPL-MCNC: 8.4 MG/DL (ref 8.5–10.1)
CHLORIDE SERPL-SCNC: 106 MMOL/L (ref 100–111)
CO2 SERPL-SCNC: 31 MMOL/L (ref 21–32)
CREAT SERPL-MCNC: 1.17 MG/DL (ref 0.6–1.3)
ERYTHROCYTE [DISTWIDTH] IN BLOOD BY AUTOMATED COUNT: 14.1 % (ref 11.6–14.5)
GLOBULIN SER CALC-MCNC: 3.1 G/DL (ref 2–4)
GLUCOSE SERPL-MCNC: 66 MG/DL (ref 74–99)
HCT VFR BLD AUTO: 42.1 % (ref 36–48)
HGB BLD-MCNC: 13.7 G/DL (ref 13–16)
INR PPP: 1 (ref 0.8–1.2)
MCH RBC QN AUTO: 28.7 PG (ref 24–34)
MCHC RBC AUTO-ENTMCNC: 32.5 G/DL (ref 31–37)
MCV RBC AUTO: 88.1 FL (ref 74–97)
PLATELET # BLD AUTO: 222 K/UL (ref 135–420)
PMV BLD AUTO: 9.3 FL (ref 9.2–11.8)
POTASSIUM SERPL-SCNC: 4.5 MMOL/L (ref 3.5–5.5)
PROT SERPL-MCNC: 6.4 G/DL (ref 6.4–8.2)
PROTHROMBIN TIME: 13.4 SEC (ref 11.5–15.2)
RBC # BLD AUTO: 4.78 M/UL (ref 4.7–5.5)
SODIUM SERPL-SCNC: 140 MMOL/L (ref 136–145)
TSH SERPL DL<=0.05 MIU/L-ACNC: 1.47 UIU/ML (ref 0.36–3.74)
WBC # BLD AUTO: 4.6 K/UL (ref 4.6–13.2)

## 2019-10-11 PROCEDURE — 84443 ASSAY THYROID STIM HORMONE: CPT

## 2019-10-11 PROCEDURE — 80053 COMPREHEN METABOLIC PANEL: CPT

## 2019-10-11 PROCEDURE — 85730 THROMBOPLASTIN TIME PARTIAL: CPT

## 2019-10-11 PROCEDURE — 85027 COMPLETE CBC AUTOMATED: CPT

## 2019-10-11 PROCEDURE — 36415 COLL VENOUS BLD VENIPUNCTURE: CPT

## 2019-10-11 PROCEDURE — 85610 PROTHROMBIN TIME: CPT

## 2019-10-17 ENCOUNTER — HOSPITAL ENCOUNTER (OUTPATIENT)
Age: 74
Setting detail: OUTPATIENT SURGERY
Discharge: HOME | End: 2019-10-17
Attending: INTERNAL MEDICINE | Admitting: INTERNAL MEDICINE
Payer: MEDICARE

## 2019-10-17 VITALS
HEIGHT: 75 IN | BODY MASS INDEX: 24 KG/M2 | SYSTOLIC BLOOD PRESSURE: 124 MMHG | RESPIRATION RATE: 7 BRPM | TEMPERATURE: 97.4 F | HEART RATE: 60 BPM | DIASTOLIC BLOOD PRESSURE: 80 MMHG | WEIGHT: 193 LBS | OXYGEN SATURATION: 99 %

## 2019-10-17 DIAGNOSIS — I25.118 ATHSCL HEART DISEASE OF NATIVE COR ART W OTH ANG PCTRS (HCC): ICD-10-CM

## 2019-10-17 LAB — END DIASTOLIC PRESSURE: 21

## 2019-10-17 PROCEDURE — 74011000250 HC RX REV CODE- 250: Performed by: INTERNAL MEDICINE

## 2019-10-17 PROCEDURE — 93458 L HRT ARTERY/VENTRICLE ANGIO: CPT | Performed by: INTERNAL MEDICINE

## 2019-10-17 PROCEDURE — 77030012468 HC VLV BLEEDBK CNTRL ABBT -B: Performed by: INTERNAL MEDICINE

## 2019-10-17 PROCEDURE — 74011636320 HC RX REV CODE- 636/320: Performed by: INTERNAL MEDICINE

## 2019-10-17 PROCEDURE — 99152 MOD SED SAME PHYS/QHP 5/>YRS: CPT | Performed by: INTERNAL MEDICINE

## 2019-10-17 PROCEDURE — C1769 GUIDE WIRE: HCPCS | Performed by: INTERNAL MEDICINE

## 2019-10-17 PROCEDURE — 93571 IV DOP VEL&/PRESS C FLO 1ST: CPT | Performed by: INTERNAL MEDICINE

## 2019-10-17 PROCEDURE — 74011250636 HC RX REV CODE- 250/636: Performed by: INTERNAL MEDICINE

## 2019-10-17 PROCEDURE — 99153 MOD SED SAME PHYS/QHP EA: CPT | Performed by: INTERNAL MEDICINE

## 2019-10-17 PROCEDURE — 77030013744: Performed by: INTERNAL MEDICINE

## 2019-10-17 PROCEDURE — 77030013797 HC KT TRNSDUC PRSSR EDWD -A: Performed by: INTERNAL MEDICINE

## 2019-10-17 PROCEDURE — C1894 INTRO/SHEATH, NON-LASER: HCPCS | Performed by: INTERNAL MEDICINE

## 2019-10-17 PROCEDURE — C1887 CATHETER, GUIDING: HCPCS | Performed by: INTERNAL MEDICINE

## 2019-10-17 PROCEDURE — 74011250637 HC RX REV CODE- 250/637: Performed by: INTERNAL MEDICINE

## 2019-10-17 PROCEDURE — 77030019569 HC BND COMPR RAD TERU -B: Performed by: INTERNAL MEDICINE

## 2019-10-17 PROCEDURE — 77030015766: Performed by: INTERNAL MEDICINE

## 2019-10-17 RX ORDER — MIDAZOLAM HYDROCHLORIDE 1 MG/ML
INJECTION, SOLUTION INTRAMUSCULAR; INTRAVENOUS AS NEEDED
Status: DISCONTINUED | OUTPATIENT
Start: 2019-10-17 | End: 2019-10-17 | Stop reason: HOSPADM

## 2019-10-17 RX ORDER — GUAIFENESIN 100 MG/5ML
81 LIQUID (ML) ORAL
Status: COMPLETED | OUTPATIENT
Start: 2019-10-17 | End: 2019-10-17

## 2019-10-17 RX ORDER — SODIUM CHLORIDE 0.9 % (FLUSH) 0.9 %
5-40 SYRINGE (ML) INJECTION AS NEEDED
Status: DISCONTINUED | OUTPATIENT
Start: 2019-10-17 | End: 2019-10-18 | Stop reason: HOSPADM

## 2019-10-17 RX ORDER — VERAPAMIL HYDROCHLORIDE 2.5 MG/ML
INJECTION, SOLUTION INTRAVENOUS AS NEEDED
Status: DISCONTINUED | OUTPATIENT
Start: 2019-10-17 | End: 2019-10-17 | Stop reason: HOSPADM

## 2019-10-17 RX ORDER — SODIUM CHLORIDE 0.9 % (FLUSH) 0.9 %
5-40 SYRINGE (ML) INJECTION EVERY 8 HOURS
Status: DISCONTINUED | OUTPATIENT
Start: 2019-10-17 | End: 2019-10-18 | Stop reason: HOSPADM

## 2019-10-17 RX ORDER — FENTANYL CITRATE 50 UG/ML
INJECTION, SOLUTION INTRAMUSCULAR; INTRAVENOUS AS NEEDED
Status: DISCONTINUED | OUTPATIENT
Start: 2019-10-17 | End: 2019-10-17 | Stop reason: HOSPADM

## 2019-10-17 RX ORDER — SODIUM CHLORIDE 9 MG/ML
250 INJECTION, SOLUTION INTRAVENOUS ONCE
Status: COMPLETED | OUTPATIENT
Start: 2019-10-17 | End: 2019-10-17

## 2019-10-17 RX ORDER — LIDOCAINE HYDROCHLORIDE 10 MG/ML
INJECTION, SOLUTION EPIDURAL; INFILTRATION; INTRACAUDAL; PERINEURAL AS NEEDED
Status: DISCONTINUED | OUTPATIENT
Start: 2019-10-17 | End: 2019-10-17 | Stop reason: HOSPADM

## 2019-10-17 RX ORDER — SODIUM CHLORIDE 9 MG/ML
INJECTION, SOLUTION INTRAVENOUS
Status: COMPLETED | OUTPATIENT
Start: 2019-10-17 | End: 2019-10-17

## 2019-10-17 RX ORDER — HEPARIN SODIUM 1000 [USP'U]/ML
INJECTION, SOLUTION INTRAVENOUS; SUBCUTANEOUS AS NEEDED
Status: DISCONTINUED | OUTPATIENT
Start: 2019-10-17 | End: 2019-10-17 | Stop reason: HOSPADM

## 2019-10-17 RX ADMIN — SODIUM CHLORIDE 250 ML: 900 INJECTION, SOLUTION INTRAVENOUS at 08:07

## 2019-10-17 RX ADMIN — ASPIRIN 81 MG 81 MG: 81 TABLET ORAL at 08:11

## 2019-10-17 NOTE — Clinical Note
TRANSFER - IN REPORT:  
 
Verbal report received from: Patrick. Report consisted of patient's Situation, Background, Assessment and  
Recommendations(SBAR). Opportunity for questions and clarification was provided. Assessment completed upon patient's arrival to unit and care assumed. Patient transported with a Cardiac Cath Tech / Patient Care Tech.

## 2019-10-17 NOTE — Clinical Note
Right groin and right radial clipped, prepped with ChloraPrep and draped. Wet prep solution applied at: 835. Wet prep solution dried at: 838. Wet prep elapsed drying time: 3 mins.

## 2019-10-17 NOTE — Clinical Note
TRANSFER - OUT REPORT:  
 
Verbal report given to: [facility] RN CCHELSY. Report consisted of patient's Situation, Background, Assessment and  
Recommendations(SBAR). Opportunity for questions and clarification was provided. Patient transported with a Cardiac Cath Tech / Patient Care Tech. Patient transported to: [address].

## 2019-10-17 NOTE — DISCHARGE INSTRUCTIONS
Patient Education        Coronary Angiogram: What to Expect at Home  Your Recovery    A coronary angiogram is a test to examine the large blood vessel of your heart (coronary artery). The doctor inserted a thin, flexible tube (catheter) into a blood vessel in your groin. In some cases, the catheter is placed in a blood vessel in the arm. Your groin or arm may have a bruise and feel sore for a day or two after a coronary angiogram. You can do light activities around the house but nothing strenuous for several days. This care sheet gives you a general idea about how long it will take for you to recover. But each person recovers at a different pace. Follow the steps below to feel better as quickly as possible. How can you care for yourself at home? Activity    · If the doctor gave you a sedative:  ? For 24 hours, don't do anything that requires attention to detail, such as going to work, making important decisions, or signing any legal documents. It takes time for the medicine's effects to completely wear off.  ? For your safety, do not drive or operate any machinery that could be dangerous. Wait until the medicine wears off and you can think clearly and react easily.     · Do not do strenuous exercise and do not lift, pull, or push anything heavy until your doctor says it is okay. This may be for a day or two. You can walk around the house and do light activity, such as cooking.     · If the catheter was placed in your groin, try not to walk up stairs for the first couple of days.     · If the catheter was placed in your arm near your wrist, do not bend your wrist deeply for the first couple of days. Be careful using your hand to get into and out of a chair or bed.     · If your doctor recommends it, get more exercise. Walking is a good choice. Bit by bit, increase the amount you walk every day. Try for at least 30 minutes on most days of the week.    Diet    · Drink plenty of fluids to help your body flush out the dye. If you have kidney, heart, or liver disease and have to limit fluids, talk with your doctor before you increase the amount of fluids you drink.     · Keep eating a heart-healthy diet that has lots of fruits, vegetables, and whole grains. If you have not been eating this way, talk to your doctor. You also may want to talk to a dietitian. This expert can help you to learn about healthy foods and plan meals. Medicines    · Your doctor will tell you if and when you can restart your medicines. He or she will also give you instructions about taking any new medicines.     · If you take blood thinners, such as warfarin (Coumadin), clopidogrel (Plavix), or aspirin, be sure to talk to your doctor. He or she will tell you if and when to start taking those medicines again. Make sure that you understand exactly what your doctor wants you to do.     · Your doctor may prescribe a blood-thinning medicine like aspirin or clopidogrel (Plavix). It is very important that you take these medicines exactly as directed in order to keep the coronary artery open and reduce your risk of a heart attack. Be safe with medicines. Call your doctor if you think you are having a problem with your medicine.    Care of the catheter site    · For 1 or 2 days, keep a bandage over the spot where the catheter was inserted. The bandage probably will fall off in this time.     · Put ice or a cold pack on the area for 10 to 20 minutes at a time to help with soreness or swelling. Put a thin cloth between the ice and your skin.     · You may shower 24 to 48 hours after the procedure, if your doctor okays it. Pat the incision dry.     · Do not soak the catheter site until it is healed. Don't take a bath for 1 week, or until your doctor tells you it is okay.     · Watch for bleeding from the site.  A small amount of blood (up to the size of a quarter) on the bandage can be normal.     · If you are bleeding, lie down and press on the area for 15 minutes to try to make it stop. If the bleeding does not stop, call your doctor or seek immediate medical care. Follow-up care is a key part of your treatment and safety. Be sure to make and go to all appointments, and call your doctor if you are having problems. It's also a good idea to know your test results and keep a list of the medicines you take. When should you call for help? Call 911 anytime you think you may need emergency care. For example, call if:    · You passed out (lost consciousness).     · You have severe trouble breathing.     · You have sudden chest pain and shortness of breath, or you cough up blood.     · You have symptoms of a heart attack. These may include:  ? Chest pain or pressure, or a strange feeling in the chest.  ? Sweating. ? Shortness of breath. ? Nausea or vomiting. ? Pain, pressure, or a strange feeling in the back, neck, jaw, or upper belly, or in one or both shoulders or arms. ? Lightheadedness or sudden weakness. ? A fast or irregular heartbeat. After you call 911, the  may tel you to chew 1 adult-strength or 2 to 4 low-dose aspirin. Wait for an ambulance. Do not try to drive yourself.     · You have been diagnosed with angina, and you have symptoms that do not go away with rest or are not getting better within 5 minutes after you take a dose of nitroglycerin.    Call your doctor now or seek immediate medical care if:    · You are bleeding from the area where the catheter was put in your artery.     · You have a fast-growing, painful lump at the catheter site.     · You have signs of infection, such as:  ? Increased pain, swelling, warmth, or redness. ? Red streaks leading from the catheter site. ? Pus draining from the catheter site. ? A fever.     · Your leg, arm, or hand is painful, looks blue, or feels cold, numb, or tingly.    Watch closely for changes in your health, and be sure to contact your doctor if you have any problems. Where can you learn more?   Go to http://erasto.info/. Enter L881 in the search box to learn more about \"Coronary Angiogram: What to Expect at Home. \"  Current as of: April 9, 2019  Content Version: 12.2  © 9710-5941 [employer]. Care instructions adapted under license by [email or web address] (which disclaims liability or warranty for this information). If you have questions about a medical condition or this instruction, always ask your healthcare professional. [address] any warranty or liability for your use of this information. DISCHARGE SUMMARY from Nurse    PATIENT INSTRUCTIONS:    After general anesthesia or intravenous sedation, for 24 hours or while taking prescription Narcotics:  · Limit your activities  · Do not drive and operate hazardous machinery  · Do not make important personal or business decisions  · Do  not drink alcoholic beverages  · If you have not urinated within 8 hours after discharge, please contact your surgeon on call. Report the following to your surgeon:  · Excessive pain, swelling, redness or odor of or around the surgical area  · Temperature over 100.5  · Nausea and vomiting lasting longer than 4 hours or if unable to take medications  · Any signs of decreased circulation or nerve impairment to extremity: change in color, persistent  numbness, tingling, coldness or increase pain  · Any questions    What to do at Home:  Recommended activity: No lifting, Driving, or Strenuous exercise for 24 hours. If you experience any of the following symptoms bleeding,swelling,acute pain or numbness, fever, please follow up with Dr. Radha Mariscal MD.    *  Please give a list of your current medications to your Primary Care Provider. *  Please update this list whenever your medications are discontinued, doses are      changed, or new medications (including over-the-counter products) are added.     *  Please carry medication information at all times in case of emergency situations. These are general instructions for a healthy lifestyle:    No smoking/ No tobacco products/ Avoid exposure to second hand smoke  Surgeon General's Warning:  Quitting smoking now greatly reduces serious risk to your health. Obesity, smoking, and sedentary lifestyle greatly increases your risk for illness    A healthy diet, regular physical exercise & weight monitoring are important for maintaining a healthy lifestyle    You may be retaining fluid if you have a history of heart failure or if you experience any of the following symptoms:  Weight gain of 3 pounds or more overnight or 5 pounds in a week, increased swelling in our hands or feet or shortness of breath while lying flat in bed. Please call your doctor as soon as you notice any of these symptoms; do not wait until your next office visit. The discharge information has been reviewed with the patient. The patient verbalized understanding. Discharge medications reviewed with the patient and appropriate educational materials and side effects teaching were provided. Patient armband removed and shredded  MyChart Activation    Thank you for requesting access to [employer]. Please follow the instructions below to securely access and download your online medical record. [employer] allows you to send messages to your doctor, view your test results, renew your prescriptions, schedule appointments, and more. How Do I Sign Up? 1. In your internet browser, go to [email or web address]. 2. Click on the First Time User? Click Here link in the Sign In box. You will see the New Member Sign Up page. 3. Enter your [employer] Access Code exactly as it appears below. You will not need to use this code after youve completed the sign-up process. If you do not sign up before the expiration date, you must request a new code.     [facility] Access Code: [email or web address]  Expires: 11/15/2019  1:21 PM (This is the date your [facility] access code will )    4. Enter the last four digits of your Social Security Number (xxxx) and Date of Birth (mm/dd/yyyy) as indicated and click Submit. You will be taken to the next sign-up page. 5. Create a [employer] ID. This will be your [employer] login ID and cannot be changed, so think of one that is secure and easy to remember. 6. Create a [employer] password. You can change your password at any time. 7. Enter your Password Reset Question and Answer. This can be used at a later time if you forget your password. 8. Enter your e-mail address. You will receive e-mail notification when new information is available in [address] 9. Click Sign Up. You can now view and download portions of your medical record. 10. Click the Download Summary menu link to download a portable copy of your medical information. Additional Information    If you have questions, please visit the Frequently Asked Questions section of the [employer] website at [email or web address] Remember, [employer] is NOT to be used for urgent needs. For medical emergencies, dial 911. Cardiac Catheterization/Angiography Discharge Instructions    *Check the puncture site frequently for swelling or bleeding. If you see any bleeding, lie down and apply pressure over the area with a clean town or washcloth. Notify your doctor for any redness, swelling, drainage or oozing from the puncture site. Notify your doctor for any fever or chills. *If the leg or arm with the puncture becomes cold, numb or painful, call Dr Barbra Mariee MD.    *Activity should be limited for the next 48 hours. Climb stairs as little as possible and avoid any stooping, bending or strenuous activity for 48 hours. No heavy lifting (anything over 10 pounds) for three days. *Do not drive for 48 hours. *You may resume your usual diet.  Drink more fluids than usual.    *Have a responsible person drive you home and stay with you for at least 24 hours after your heart catheterization/angiography. *You may remove the bandage from your Right Wrist in 24 hours. You may shower in 24 hours. No tub baths, hot tubs or swimming for one week. Do not place any lotions, creams, powders, ointments over the puncture site for one week. You may place a clean band-aid over the puncture site each day for 5 days. Change this daily.           ___________________________________________________________________________________________________________________________________

## 2019-10-17 NOTE — ROUTINE PROCESS
A+Ox4, ambulatory without difficulty, denied any complaints. Right wrist dressing intact, no bleeding or swelling. Normal radial pulse and neuro check. Discharge information reviewed. Escorted to car, tot his cousin for transport.

## 2019-10-17 NOTE — Clinical Note
Contrast Dose Calculator:  
Patient's age: 75.  
Patient's sex: Male. Patient weight (kg) = 87.5. Creatinine level (mg/dL) = 1.17. Creatinine clearance (mL/min): 68.  
Contrast concentration (mg/mL) = 300. MACD = 300 mL. Max Contrast dose per Creatinine Cl calculator = 153 mL.

## 2019-10-17 NOTE — H&P
H&P update    No new symptoms  Risks, benefits and alternatives of LHC/ptca/stent explained to patient.   All question answered

## 2019-11-13 RX ORDER — SIMVASTATIN 20 MG/1
20 TABLET, FILM COATED ORAL
Qty: 90 TAB | Refills: 2 | Status: SHIPPED | OUTPATIENT
Start: 2019-11-13 | End: 2020-12-15 | Stop reason: SDUPTHER

## 2019-12-10 ENCOUNTER — OFFICE VISIT (OUTPATIENT)
Dept: CARDIOLOGY CLINIC | Age: 74
End: 2019-12-10

## 2019-12-10 VITALS
WEIGHT: 195 LBS | HEIGHT: 75 IN | HEART RATE: 75 BPM | BODY MASS INDEX: 24.25 KG/M2 | DIASTOLIC BLOOD PRESSURE: 81 MMHG | SYSTOLIC BLOOD PRESSURE: 124 MMHG

## 2019-12-10 DIAGNOSIS — E78.5 DYSLIPIDEMIA: ICD-10-CM

## 2019-12-10 DIAGNOSIS — I51.9 LV DYSFUNCTION: ICD-10-CM

## 2019-12-10 DIAGNOSIS — I25.118 CORONARY ARTERY DISEASE OF NATIVE ARTERY OF NATIVE HEART WITH STABLE ANGINA PECTORIS (HCC): Primary | ICD-10-CM

## 2019-12-10 DIAGNOSIS — I73.9 PAD (PERIPHERAL ARTERY DISEASE) (HCC): ICD-10-CM

## 2019-12-10 DIAGNOSIS — I50.32 CHRONIC DIASTOLIC HEART FAILURE (HCC): ICD-10-CM

## 2019-12-10 DIAGNOSIS — I10 BENIGN ESSENTIAL HTN: ICD-10-CM

## 2019-12-10 RX ORDER — MELOXICAM 15 MG/1
15 TABLET ORAL DAILY
COMMUNITY

## 2019-12-10 RX ORDER — FUROSEMIDE 40 MG/1
40 TABLET ORAL 2 TIMES DAILY
Qty: 60 TAB | Refills: 3 | Status: SHIPPED | OUTPATIENT
Start: 2019-12-10 | End: 2021-09-15 | Stop reason: SDUPTHER

## 2019-12-10 NOTE — PROGRESS NOTES
HISTORY OF PRESENT ILLNESS  Belen Shen is a 75 y.o. male. CHF   The history is provided by the patient. This is a chronic problem. The current episode started more than 1 week ago. The problem occurs daily. The problem has not changed since onset. Associated symptoms include shortness of breath. Claudication   The history is provided by the patient. This is a chronic problem. The current episode started more than 1 week ago. The problem occurs daily. The problem has been gradually improving. Associated symptoms include shortness of breath. The symptoms are aggravated by exertion and walking. The symptoms are relieved by rest. He has tried rest for the symptoms. Shortness of Breath   The history is provided by the patient. This is a chronic problem. The problem occurs intermittently. The problem has not changed since onset. Associated symptoms include claudication. Pertinent negatives include no PND and no orthopnea. Review of Systems   Constitutional: Negative for chills and weight loss. HENT: Negative for hearing loss. Eyes: Negative for blurred vision. Respiratory: Positive for shortness of breath. Negative for stridor. Cardiovascular: Positive for claudication. Negative for orthopnea and PND. Gastrointestinal: Negative for heartburn. Musculoskeletal: Negative for myalgias. Neurological: Negative for tingling, tremors, focal weakness and loss of consciousness. Psychiatric/Behavioral: Negative for depression and suicidal ideas.      Family History   Problem Relation Age of Onset    Heart Attack Neg Hx     Stroke Neg Hx        Past Medical History:   Diagnosis Date    Abdominal pain     Benign essential HTN 2/17/2016    Dyslipidemia 1/24/2017    Esophageal reflux     Heartburn     High cholesterol     Incontinence     Prostate cancer screening     Swelling of both lower extremities     Urine incontinence        Past Surgical History:   Procedure Laterality Date    HX CHOLECYSTECTOMY      HX GI      HX HERNIA REPAIR Bilateral     HX TONSILLECTOMY         Social History     Tobacco Use    Smoking status: Never Smoker    Smokeless tobacco: Never Used   Substance Use Topics    Alcohol use: No       No Known Allergies    Outpatient Medications Marked as Taking for the 12/10/19 encounter (Office Visit) with Harriet Baez MD   Medication Sig Dispense Refill    meloxicam (MOBIC) 15 mg tablet Take 15 mg by mouth daily.  furosemide (LASIX) 40 mg tablet Take 1 Tab by mouth two (2) times a day. 60 Tab 3    simvastatin (ZOCOR) 20 mg tablet Take 1 Tab by mouth nightly. 90 Tab 2    metoclopramide HCl (REGLAN) 10 mg tablet Take 10 mg by mouth Before breakfast, lunch, dinner and at bedtime.  aspirin delayed-release 81 mg tablet Take  by mouth daily.  carvedilol (COREG) 3.125 mg tablet Take 1 Tab by mouth two (2) times daily (with meals). 60 Tab 6    trospium (SANCTURA) 20 mg tablet Take 1 Tab by mouth two (2) times a day. 60 Tab 11    sildenafil citrate (VIAGRA) 100 mg tablet Take 100 mg by mouth as needed.  albuterol (VENTOLIN HFA) 90 mcg/actuation inhaler Take  by inhalation.  tamsulosin (FLOMAX) 0.4 mg capsule Take 1 Cap by mouth daily (after dinner). 90 Cap 3    PARoxetine (PAXIL) 20 mg tablet Take  by mouth daily.  baclofen (LIORESAL) 20 mg tablet Take 20 mg by mouth two (2) times daily as needed. Visit Vitals  /81   Pulse 75   Ht 6' 3\" (1.905 m) Comment: Simultaneous filing. User may not have seen previous data. Wt 88.5 kg (195 lb)   BMI 24.37 kg/m²     Physical Exam   Constitutional: He is oriented to person, place, and time. He appears well-developed and well-nourished. No distress. HENT:   Head: Atraumatic. Mouth/Throat: No oropharyngeal exudate. Eyes: Conjunctivae are normal. No scleral icterus. Neck: Neck supple. No JVD present. Cardiovascular: Normal rate and regular rhythm. Exam reveals no gallop.    No murmur heard. Pulmonary/Chest: Effort normal and breath sounds normal. No stridor. He has no wheezes. He has no rales. Abdominal: Soft. There is no tenderness. There is no rebound. Musculoskeletal: Normal range of motion. General: Edema (mild edema) present. Neurological: He is alert and oriented to person, place, and time. He exhibits normal muscle tone. Skin: Skin is warm. He is not diaphoretic. Psychiatric: He has a normal mood and affect. His behavior is normal.     6/16 Cardiac Cath  FINDINGS:  1. Left main is patent and bifurcates into left anterior descending artery  and circumflex artery. 2. Left anterior descending artery has mild ectasia with moderate to severe  stenosis in the proximal portion. It appears to be a napkin ring type  lesion. By IVUS imaging, we obtained a stenosis of 62% with an area of 3.7  mm2. Mid to distal left anterior descending artery had wall irregularities  with sluggish flow, suggestive of severe microvascular disease. Apical LAD  had wall irregularities. 3. Diagonal 1 and diagonal 2 artery appeared to be small caliber vessel  with wall irregularities. 4. Circumflex artery is codominant with sluggish flow consistent with  microvascular disease. 5. Right coronary artery is codominant with sluggish flow suggestive of  microvascular disease. 6. Left ventricular end-diastolic pressure was 13 mmHg.     CONCLUSION: Single-vessel coronary artery disease.  Left anterior  descending artery stenosis was 62% in the proximal portion by intravascular  ultrasound imaging. Normal left ventricular and diastolic pressure. The  patient is to be on intense medical management and risk factor  Modification. 02/01/19   ECHO ADULT COMPLETE 02/04/2019 2/4/2019    Narrative · Left ventricular mildly decreased systolic function. Estimated left   ventricular ejection fraction is 46 - 50%. Left ventricular global   hypokinesis.  Mild (grade 1) left ventricular diastolic dysfunction. · Right atrial cavity size is mildly dilated. · Mild aortic valve regurgitation is present. · Mitral valve thickening. Mild mitral valve regurgitation. · Mild tricuspid valve regurgitation is present. There is no evidence of   pulmonary hypertension. · Mild pulmonic valve regurgitation is present. Signed by: Yan Vizcaino MD     10/17/19   CARDIAC PROCEDURE 10/17/2019 10/17/2019    Narrative Moderate LAD stenosis. Critical stenosis of D3 ( small vessel) with mild   to moderate LV dysfunction. Continue intense risk factor modification     Signed by: Yan Vizcaino MD     ASSESSMENT and PLAN    ICD-10-CM ICD-9-CM    1. Coronary artery disease of native artery of native heart with stable angina pectoris ([facility] [address] I25.118 414.01      413.9    2. Dyslipidemia E78.5 272.4    3. Chronic diastolic heart failure (HCC) I50.32 428.32    4. LV dysfunction I51.9 429.9    5. Benign essential HTN I10 401.1    6. PAD (peripheral artery disease) ([facility]) I73.9 443.9      Orders Placed This Encounter    furosemide (LASIX) 40 mg tablet     Sig: Take 1 Tab by mouth two (2) times a day. Dispense:  60 Tab     Refill:  3     Follow-up and Dispositions    · Return in about 6 months (around 6/10/2020). Patient with LV dysfunction/ abnormal stress test--underwent repeat cardiac catheterization which revealed mid 50% LAD stenosis. FFR was 0.94. Has mild LV dysfunction which is been stable. Continue current medications and follow-up in 6 months.

## 2019-12-10 NOTE — PROGRESS NOTES
1. Have you been to the ER, urgent care clinic since your last visit? Hospitalized since your last visit?     no    2. Have you seen or consulted any other health care providers outside of the [address] [place] [address] since your last visit? Include any pap smears or colon screening.       No

## 2020-02-18 ENCOUNTER — OFFICE VISIT (OUTPATIENT)
Dept: CARDIOLOGY CLINIC | Age: 75
End: 2020-02-18

## 2020-02-18 VITALS
WEIGHT: 202.6 LBS | BODY MASS INDEX: 25.19 KG/M2 | DIASTOLIC BLOOD PRESSURE: 68 MMHG | OXYGEN SATURATION: 98 % | HEART RATE: 62 BPM | HEIGHT: 75 IN | SYSTOLIC BLOOD PRESSURE: 127 MMHG

## 2020-02-18 DIAGNOSIS — I50.32 CHRONIC DIASTOLIC HEART FAILURE (HCC): ICD-10-CM

## 2020-02-18 DIAGNOSIS — I25.118 CORONARY ARTERY DISEASE OF NATIVE ARTERY OF NATIVE HEART WITH STABLE ANGINA PECTORIS (HCC): Primary | ICD-10-CM

## 2020-02-18 DIAGNOSIS — K21.9 GASTROESOPHAGEAL REFLUX DISEASE WITHOUT ESOPHAGITIS: ICD-10-CM

## 2020-02-18 DIAGNOSIS — I51.9 LV DYSFUNCTION: ICD-10-CM

## 2020-02-18 DIAGNOSIS — I10 BENIGN ESSENTIAL HTN: ICD-10-CM

## 2020-02-18 DIAGNOSIS — E78.5 DYSLIPIDEMIA: ICD-10-CM

## 2020-02-18 DIAGNOSIS — I73.9 PAD (PERIPHERAL ARTERY DISEASE) (HCC): ICD-10-CM

## 2020-02-18 NOTE — PROGRESS NOTES
"Subjective   Bonnie Dewitt is a 67 y.o. female seen for follow up for hypothyroidism, vit d deficiency, lab review. Patient states that her PCP adjusted her dose based on labs in 01/2018 and she is now taking Levothyroxine alternating each day 75 and 50 mcg. She denies any problems or concerns.     History of Present Illness this is a 67-year-old female known patient with hypothyroidism as well as vitamin D deficiency and hyperuricemia as well as dyslipidemia and adrenal adenoma.  Over the course of last 6 months she has had no significant health problems for which to go to the emergency room or hospital.    /82   Resp 16   Ht 170.2 cm (67\")   Wt 68 kg (150 lb)   LMP  (LMP Unknown)   BMI 23.49 kg/m²      Allergies   Allergen Reactions   • Raloxifene Nausea And Vomiting     DIZZINESS  DIZZINESS  Dizziness and nausea       Current Outpatient Prescriptions:   •  aspirin 81 MG tablet, Take 81 mg by mouth daily., Disp: , Rfl:   •  lovastatin (MEVACOR) 40 MG tablet, Take 40 mg by mouth daily., Disp: , Rfl:   •  SYNTHROID 75 MCG tablet, TAKE 1 TABLET BY MOUTH DAILY. ON EMPTY STOMACH (Patient taking differently: TAKE 1 TABLET BY MOUTH DAILY. ON EMPTY STOMACH; alternating with 50 mcg daily), Disp: 30 tablet, Rfl: 5  •  ULORIC 40 MG tablet, TAKE 1 TABLET BY MOUTH DAILY., Disp: 30 tablet, Rfl: 5      The following portions of the patient's history were reviewed and updated as appropriate: allergies, current medications, past family history, past medical history, past social history, past surgical history and problem list.    Review of Systems   Constitutional: Negative.    HENT: Negative.    Eyes: Negative.    Respiratory: Negative.    Cardiovascular: Negative.    Gastrointestinal: Negative.    Endocrine: Negative.    Genitourinary: Negative.    Musculoskeletal: Negative.    Skin: Negative.    Allergic/Immunologic: Negative.    Neurological: Negative.    Hematological: Negative.    Psychiatric/Behavioral: " 1. Have you been to the ER, urgent care clinic since your last visit? Hospitalized since your last visit? No    2. Have you seen or consulted any other health care providers outside of the [address] [place] [address] since your last visit? Include any pap smears or colon screening.  Yes Where: PCP Reason for visit: Routine Visit Negative.        Objective   Physical Exam   Constitutional: She is oriented to person, place, and time. She appears well-developed and well-nourished. No distress.   HENT:   Head: Normocephalic and atraumatic.   Right Ear: External ear normal.   Left Ear: External ear normal.   Nose: Nose normal.   Mouth/Throat: Oropharynx is clear and moist. No oropharyngeal exudate.   Eyes: Conjunctivae and EOM are normal. Pupils are equal, round, and reactive to light. Right eye exhibits no discharge. Left eye exhibits no discharge. No scleral icterus.   Neck: Normal range of motion. Neck supple. No JVD present. No tracheal deviation present. No thyromegaly present.   Cardiovascular: Normal rate, regular rhythm, normal heart sounds and intact distal pulses.  Exam reveals no gallop and no friction rub.    No murmur heard.  Pulmonary/Chest: Effort normal and breath sounds normal. No stridor. No respiratory distress. She has no wheezes. She has no rales. She exhibits no tenderness.   Abdominal: Soft. Bowel sounds are normal. She exhibits no distension and no mass. There is no tenderness. There is no rebound and no guarding. No hernia.   Musculoskeletal: Normal range of motion. She exhibits no edema, tenderness or deformity.   Lymphadenopathy:     She has no cervical adenopathy.   Neurological: She is alert and oriented to person, place, and time. She has normal reflexes. She displays normal reflexes. No cranial nerve deficit. She exhibits normal muscle tone. Coordination normal.   Skin: Skin is warm and dry. No rash noted. She is not diaphoretic. No erythema. No pallor.   Psychiatric: She has a normal mood and affect. Her behavior is normal. Judgment and thought content normal.   Nursing note and vitals reviewed.    Results for orders placed or performed during the hospital encounter of 02/27/18   POC Creatinine   Result Value Ref Range    Creatinine 0.80 0.60 - 1.30 mg/dL     Lab Results   Component Value Date    GLUCOSE 94  12/11/2017    BUN 12 05/07/2018    CREATININE 0.79 05/07/2018    EGFRIFNONA 72 05/07/2018    EGFRIFAFRI 88 05/07/2018    BCR 15.2 05/07/2018    K 4.8 05/07/2018    CO2 26.0 05/07/2018    CALCIUM 9.8 05/07/2018    PROTENTOTREF 6.8 05/07/2018    ALBUMIN 4.70 05/07/2018    LABIL2 2.2 05/07/2018    AST 15 05/07/2018    ALT 12 05/07/2018     Lab Results   Component Value Date    TSH 1.870 05/07/2018     Lab Results   Component Value Date    CHOL 177 12/11/2017    CHOL 166 06/19/2017    CHOL 184 11/23/2016    CHLPL 164 05/07/2018    CHLPL 156 10/23/2017    CHLPL 162 04/24/2017     Lab Results   Component Value Date    TRIG 98 05/07/2018    TRIG 173 (H) 12/11/2017    TRIG 115 10/23/2017     Lab Results   Component Value Date    HDL 62 (H) 05/07/2018    HDL 62 (H) 12/11/2017    HDL 57 10/23/2017     Lab Results   Component Value Date    LDL 82 05/07/2018    LDL 80 12/11/2017    LDL 76 10/23/2017           Assessment/Plan   Diagnoses and all orders for this visit:    Primary hypothyroidism  -     T3, Free; Future  -     T4 & TSH ([facility]); Future  -     T4, Free; Future  -     Uric Acid; Future  -     Thyroglobulin With Anti-TG; Future  -     Vitamin D 25 Hydroxy; Future  -     Comprehensive Metabolic Panel; Future  -     C-Peptide; Future  -     Hemoglobin A1c; Future  -     Lipid Panel; Future  -     ACTH; Future  -     Aldosterone; Future  -     Cortisol; Future    Mixed hyperlipidemia  -     T3, Free; Future  -     T4 & TSH ([facility]); Future  -     T4, Free; Future  -     Uric Acid; Future  -     Thyroglobulin With Anti-TG; Future  -     Vitamin D 25 Hydroxy; Future  -     Comprehensive Metabolic Panel; Future  -     C-Peptide; Future  -     Hemoglobin A1c; Future  -     Lipid Panel; Future  -     ACTH; Future  -     Aldosterone; Future  -     Cortisol; Future    Vitamin D deficiency  -     T3, Free; Future  -     T4 & TSH ([facility]); Future  -     T4, Free; Future  -     Uric Acid; Future  -     Thyroglobulin With  Anti-TG; Future  -     Vitamin D 25 Hydroxy; Future  -     Comprehensive Metabolic Panel; Future  -     C-Peptide; Future  -     Hemoglobin A1c; Future  -     Lipid Panel; Future  -     ACTH; Future  -     Aldosterone; Future  -     Cortisol; Future    Adrenal adenoma, unspecified laterality  -     T3, Free; Future  -     T4 & TSH ([facility]); Future  -     T4, Free; Future  -     Uric Acid; Future  -     Thyroglobulin With Anti-TG; Future  -     Vitamin D 25 Hydroxy; Future  -     Comprehensive Metabolic Panel; Future  -     C-Peptide; Future  -     Hemoglobin A1c; Future  -     Lipid Panel; Future  -     ACTH; Future  -     Aldosterone; Future  -     Cortisol; Future    Menopausal symptoms  -     T3, Free; Future  -     T4 & TSH ([facility]); Future  -     T4, Free; Future  -     Uric Acid; Future  -     Thyroglobulin With Anti-TG; Future  -     Vitamin D 25 Hydroxy; Future  -     Comprehensive Metabolic Panel; Future  -     C-Peptide; Future  -     Hemoglobin A1c; Future  -     Lipid Panel; Future  -     ACTH; Future  -     Aldosterone; Future  -     Cortisol; Future    Prediabetes  -     T3, Free; Future  -     T4 & TSH ([facility]); Future  -     T4, Free; Future  -     Uric Acid; Future  -     Thyroglobulin With Anti-TG; Future  -     Vitamin D 25 Hydroxy; Future  -     Comprehensive Metabolic Panel; Future  -     C-Peptide; Future  -     Hemoglobin A1c; Future  -     Lipid Panel; Future  -     ACTH; Future  -     Aldosterone; Future  -     Cortisol; Future    Tobacco abuse    Other orders  -     SYNTHROID 75 MCG tablet; 1 tablet every morning  -     ULORIC 40 MG tablet; Take 1 tablet by mouth Daily.  -     lovastatin (MEVACOR) 40 MG tablet; Take 1 tablet by mouth Daily.               In summary I saw and examined this 67-year-old female for above-mentioned problems.  I reviewed her laboratory evaluation of 05/17/2018 and provided her with a hard copy of it.  She is clinically and metabolically stable and therefore we  will go ahead and continue all her current prescriptions.  I also discussed with her unsavory and dangerous consequences of continued smoking and she promised to work on that and did not want to go on Chantix because of her friends have had bad experience on it.  Also she admitted that before her last blood test as her primary care provider's office she was on multivitamin that contained Biotene and Biotene is known to interfere with the accuracy of the assay of TSH and therefore we will go ahead and resume her dose of thyroid hormone replacement at 75 µg of Synthroid per day.  She will see Ms. Genet Rashid in 6 months or sooner if needed with laboratory evaluation prior to each office visit.

## 2020-02-18 NOTE — PROGRESS NOTES
HISTORY OF PRESENT ILLNESS  Claude Lowing is a 75 y.o. male. Claudication   The history is provided by the patient. This is a chronic problem. The current episode started more than 1 week ago. The problem occurs daily. The problem has been gradually improving. Associated symptoms include shortness of breath. The symptoms are aggravated by exertion and walking. The symptoms are relieved by rest. He has tried rest for the symptoms. CHF   The history is provided by the patient. This is a chronic problem. The current episode started more than 1 week ago. The problem occurs daily. The problem has not changed since onset. Associated symptoms include shortness of breath. Shortness of Breath   The history is provided by the patient. This is a chronic problem. The problem occurs intermittently. The problem has not changed since onset. Associated symptoms include claudication. Pertinent negatives include no PND and no orthopnea. Review of Systems   Constitutional: Negative for chills and weight loss. HENT: Negative for hearing loss. Eyes: Negative for blurred vision. Respiratory: Positive for shortness of breath. Negative for stridor. Cardiovascular: Positive for claudication. Negative for orthopnea and PND. Gastrointestinal: Negative for heartburn. Musculoskeletal: Negative for myalgias. Neurological: Negative for tingling, tremors, focal weakness and loss of consciousness. Psychiatric/Behavioral: Negative for depression and suicidal ideas.      Family History   Problem Relation Age of Onset    Heart Attack Neg Hx     Stroke Neg Hx        Past Medical History:   Diagnosis Date    Abdominal pain     Benign essential HTN 2/17/2016    Dyslipidemia 1/24/2017    Esophageal reflux     Heartburn     High cholesterol     Incontinence     Prostate cancer screening     Swelling of both lower extremities     Urine incontinence        Past Surgical History:   Procedure Laterality Date    HX CHOLECYSTECTOMY      HX GI      HX HERNIA REPAIR Bilateral     HX TONSILLECTOMY         Social History     Tobacco Use    Smoking status: Never Smoker    Smokeless tobacco: Never Used   Substance Use Topics    Alcohol use: No       No Known Allergies    Outpatient Medications Marked as Taking for the 2/18/20 encounter (Office Visit) with Geri Patel MD   Medication Sig Dispense Refill    furosemide (LASIX) 40 mg tablet Take 1 Tab by mouth two (2) times a day. 60 Tab 3    simvastatin (ZOCOR) 20 mg tablet Take 1 Tab by mouth nightly. 90 Tab 2    metoclopramide HCl (REGLAN) 10 mg tablet Take 10 mg by mouth Before breakfast, lunch, dinner and at bedtime.  aspirin delayed-release 81 mg tablet Take  by mouth daily.  carvedilol (COREG) 3.125 mg tablet Take 1 Tab by mouth two (2) times daily (with meals). 60 Tab 6    trospium (SANCTURA) 20 mg tablet Take 1 Tab by mouth two (2) times a day. 60 Tab 11    albuterol (VENTOLIN HFA) 90 mcg/actuation inhaler Take  by inhalation.  tamsulosin (FLOMAX) 0.4 mg capsule Take 1 Cap by mouth daily (after dinner). 90 Cap 3    PARoxetine (PAXIL) 20 mg tablet Take  by mouth daily.  baclofen (LIORESAL) 20 mg tablet Take 20 mg by mouth two (2) times daily as needed. Visit Vitals  /68 (BP 1 Location: Left arm, BP Patient Position: Sitting)   Pulse 62   Ht 6' 3\" (1.905 m)   Wt 91.9 kg (202 lb 9.6 oz)   SpO2 98%   BMI 25.32 kg/m²     Physical Exam   Constitutional: He is oriented to person, place, and time. He appears well-developed and well-nourished. No distress. HENT:   Head: Atraumatic. Mouth/Throat: No oropharyngeal exudate. Eyes: Conjunctivae are normal. No scleral icterus. Neck: Neck supple. No JVD present. Cardiovascular: Normal rate and regular rhythm. Exam reveals no gallop. No murmur heard. Pulmonary/Chest: Effort normal and breath sounds normal. No stridor. He has no wheezes. He has no rales. Abdominal: Soft. There is no abdominal tenderness. There is no rebound. Musculoskeletal: Normal range of motion. General: Edema (mild edema) present. Neurological: He is alert and oriented to person, place, and time. He exhibits normal muscle tone. Skin: Skin is warm. He is not diaphoretic. Psychiatric: He has a normal mood and affect. His behavior is normal.     6/16 Cardiac Cath  FINDINGS:  1. Left main is patent and bifurcates into left anterior descending artery  and circumflex artery. 2. Left anterior descending artery has mild ectasia with moderate to severe  stenosis in the proximal portion. It appears to be a napkin ring type  lesion. By IVUS imaging, we obtained a stenosis of 62% with an area of 3.7  mm2. Mid to distal left anterior descending artery had wall irregularities  with sluggish flow, suggestive of severe microvascular disease. Apical LAD  had wall irregularities. 3. Diagonal 1 and diagonal 2 artery appeared to be small caliber vessel  with wall irregularities. 4. Circumflex artery is codominant with sluggish flow consistent with  microvascular disease. 5. Right coronary artery is codominant with sluggish flow suggestive of  microvascular disease. 6. Left ventricular end-diastolic pressure was 13 mmHg.     CONCLUSION: Single-vessel coronary artery disease.  Left anterior  descending artery stenosis was 62% in the proximal portion by intravascular  ultrasound imaging. Normal left ventricular and diastolic pressure. The  patient is to be on intense medical management and risk factor  Modification. 02/01/19   ECHO ADULT COMPLETE 02/04/2019 2/4/2019    Narrative · Left ventricular mildly decreased systolic function. Estimated left   ventricular ejection fraction is 46 - 50%. Left ventricular global   hypokinesis. Mild (grade 1) left ventricular diastolic dysfunction. · Right atrial cavity size is mildly dilated. · Mild aortic valve regurgitation is present. · Mitral valve thickening.  Mild mitral valve regurgitation. · Mild tricuspid valve regurgitation is present. There is no evidence of   pulmonary hypertension. · Mild pulmonic valve regurgitation is present. Signed by: Karen Pressley MD     10/17/19   CARDIAC PROCEDURE 10/17/2019 10/17/2019    Narrative Moderate LAD stenosis. Critical stenosis of D3 ( small vessel) with mild   to moderate LV dysfunction. Continue intense risk factor modification     Signed by: Karen Pressley MD     ASSESSMENT and PLAN    ICD-10-CM ICD-9-CM    1. Coronary artery disease of native artery of native heart with stable angina pectoris ([address] I25.118 414.01      413.9    2. Dyslipidemia E78.5 272.4    3. Chronic diastolic heart failure (HCC) I50.32 428.32    4. LV dysfunction I51.9 429.9    5. Benign essential HTN I10 401.1    6. PAD (peripheral artery disease) ([facility]) I73.9 443.9    7. Gastroesophageal reflux disease without esophagitis K21.9 530.81 REFERRAL TO GASTROENTEROLOGY     Orders Placed This Encounter    REFERRAL TO GASTROENTEROLOGY     Referral Priority:   Routine     Referral Type:   Consultation     Referral Reason:   Specialty Services Required     Requested Specialty:   Gastroenterology     Number of Visits Requested:   1     Follow-up and Dispositions    · Return in about 4 months (around 6/18/2020). Patient with LV dysfunction/ abnormal stress test--underwent repeat cardiac catheterization which revealed mid 50% LAD stenosis. FFR was 0.94. Has mild LV dysfunction which is been stable. Seen today for choking sensation and abdominal bloating for the last several days. Above symptoms not consistent with CAD. CHF is stable. We will obtain appointment with gastroenterology to rule out GERD/celiac stenosis. Follow-up after gastroenterology evaluation.

## 2020-02-24 ENCOUNTER — APPOINTMENT (OUTPATIENT)
Dept: GENERAL RADIOLOGY | Age: 75
End: 2020-02-24
Attending: EMERGENCY MEDICINE
Payer: MEDICARE

## 2020-02-24 ENCOUNTER — HOSPITAL ENCOUNTER (EMERGENCY)
Age: 75
Discharge: HOME | End: 2020-02-24
Attending: EMERGENCY MEDICINE
Payer: MEDICARE

## 2020-02-24 VITALS
DIASTOLIC BLOOD PRESSURE: 88 MMHG | RESPIRATION RATE: 16 BRPM | SYSTOLIC BLOOD PRESSURE: 134 MMHG | HEART RATE: 89 BPM | OXYGEN SATURATION: 97 % | TEMPERATURE: 98.8 F

## 2020-02-24 DIAGNOSIS — R35.0 URINARY FREQUENCY: ICD-10-CM

## 2020-02-24 DIAGNOSIS — M25.462 EFFUSION, LEFT KNEE: Primary | ICD-10-CM

## 2020-02-24 LAB
APPEARANCE UR: CLEAR
BILIRUB UR QL: NEGATIVE
BODY FLD TYPE: NORMAL
COLOR UR: YELLOW
CRYSTALS FLD MICRO: NEGATIVE
GLUCOSE UR STRIP.AUTO-MCNC: NEGATIVE MG/DL
GRAM STN SPEC: NORMAL
GRAM STN SPEC: NORMAL
HGB UR QL STRIP: NEGATIVE
KETONES UR QL STRIP.AUTO: ABNORMAL MG/DL
LEUKOCYTE ESTERASE UR QL STRIP.AUTO: NEGATIVE
NITRITE UR QL STRIP.AUTO: NEGATIVE
PH UR STRIP: 5 [PH] (ref 5–8)
PROT UR STRIP-MCNC: NEGATIVE MG/DL
SERVICE CMNT-IMP: NORMAL
SP GR UR REFRACTOMETRY: 1.02 (ref 1–1.03)
SPECIMEN SOURCE FLD: NORMAL
URATE FLD-MCNC: 6 MG/DL
UROBILINOGEN UR QL STRIP.AUTO: 1 EU/DL (ref 0.2–1)

## 2020-02-24 PROCEDURE — 75810000054 HC ARTHOCENTISIS JOINT

## 2020-02-24 PROCEDURE — 99281 EMR DPT VST MAYX REQ PHY/QHP: CPT

## 2020-02-24 PROCEDURE — 87205 SMEAR GRAM STAIN: CPT

## 2020-02-24 PROCEDURE — 81003 URINALYSIS AUTO W/O SCOPE: CPT

## 2020-02-24 PROCEDURE — 73562 X-RAY EXAM OF KNEE 3: CPT

## 2020-02-24 PROCEDURE — 84560 ASSAY OF URINE/URIC ACID: CPT

## 2020-02-24 PROCEDURE — 87086 URINE CULTURE/COLONY COUNT: CPT

## 2020-02-24 PROCEDURE — 87491 CHLMYD TRACH DNA AMP PROBE: CPT

## 2020-02-24 PROCEDURE — 89060 EXAM SYNOVIAL FLUID CRYSTALS: CPT

## 2020-02-24 RX ORDER — TRAMADOL HYDROCHLORIDE 50 MG/1
50 TABLET ORAL
Qty: 20 TAB | Refills: 0 | Status: SHIPPED | OUTPATIENT
Start: 2020-02-24 | End: 2020-02-29

## 2020-02-24 NOTE — DISCHARGE INSTRUCTIONS
Patient Education        Frequent Urination: Care Instructions  Your Care Instructions  An urge to urinate frequently but usually passing only small amounts of urine is a common symptom of urinary problems, such as urinary tract infections. The bladder may become inflamed. This can cause the urge to urinate. You may try to urinate more often than usual to try to soothe that urge. Frequent urination also may be caused by sexually transmitted infections (STIs) or kidney stones. Or it may happen when something irritates the tube that carries urine from the bladder to the outside of the body (urethra). It may also be a sign of diabetes. The cause may be hard to find. You may need tests. Follow-up care is a key part of your treatment and safety. Be sure to make and go to all appointments, and call your doctor if you are having problems. It's also a good idea to know your test results and keep a list of the medicines you take. How can you care for yourself at home? · Drink extra water for the next day or two. This will help make the urine less concentrated. (If you have kidney, heart, or liver disease and have to limit fluids, talk with your doctor before you increase the amount of fluids you drink.)  · Avoid drinks that are carbonated or have caffeine. They can irritate the bladder. For women:  · Urinate right after you have sex. · After you go to the bathroom, wipe from front to back. · Avoid douches, bubble baths, and feminine hygiene sprays. And avoid other feminine hygiene products that have deodorants. When should you call for help? Call your doctor now or seek immediate medical care if:    · You have new symptoms, such as fever, nausea, or vomiting.     · You have new or worse symptoms of a urinary problem. For example:  ? You have blood or pus in your urine. ? You have chills or body aches. ? It hurts to urinate. ? You have groin or belly pain. ?  You have pain in your back just below your rib cage (the flank area).    Watch closely for changes in your health, and be sure to contact your doctor if you feel thirstier than usual.  Where can you learn more? Go to http://[facility]ug[facility].info/. Enter 675 0000 in the search box to learn more about \"Frequent Urination: Care Instructions. \"  Current as of: December 19, 2018  Content Version: 12.2  © 2997-5177 [employer]. Care instructions adapted under license by [employer] (which disclaims liability or warranty for this information). If you have questions about a medical condition or this instruction, always ask your healthcare professional. [address] any warranty or liability for your use of this information.

## 2020-02-24 NOTE — ED TRIAGE NOTES
C/o urinary frequency, pt also states right knee pain. States was on his knees in bathroom when knee pain started.  Pt ambulatory to triage with slight limp no assist

## 2020-02-24 NOTE — ED TRIAGE NOTES
ED TRIAGE MD:  I performed a brief history and physical exam of Mr. Nicole Bennett. I initiated a diagnostic evaluation, and he was referred to the main side emergency department for continued diagnostic and therapeutic care.     Radames Garcia MD

## 2020-02-24 NOTE — ED PROVIDER NOTES
EMERGENCY DEPARTMENT HISTORY AND PHYSICAL EXAM    Date: 2/24/2020  Patient Name: Toma Carl    History of Presenting Illness     Chief Complaint   Patient presents with    Knee Pain    Urinary Frequency         History Provided By: Patient    Additional History (Context): Toma Carl is a 75 y.o. male with hypertension, hyperlipidemia and osteoarthritis who presents with urinary frequency as well as left knee swelling. He said swelling in his left knee and pain for about a month however 1 week ago he was down on the floor and he was doing some bathroom cleaning and then since then he felt as if his symptoms have worsened. Denies instability or additional trauma. Denies fever flank pain hematuria. Denies difficulty starting his stream or urinary retention. PCP: Lakshmi Parra MD    Current Outpatient Medications   Medication Sig Dispense Refill    meloxicam (MOBIC) 15 mg tablet Take 15 mg by mouth daily.  furosemide (LASIX) 40 mg tablet Take 1 Tab by mouth two (2) times a day. 60 Tab 3    simvastatin (ZOCOR) 20 mg tablet Take 1 Tab by mouth nightly. 90 Tab 2    metoclopramide HCl (REGLAN) 10 mg tablet Take 10 mg by mouth Before breakfast, lunch, dinner and at bedtime.  aspirin delayed-release 81 mg tablet Take  by mouth daily.  carvedilol (COREG) 3.125 mg tablet Take 1 Tab by mouth two (2) times daily (with meals). 60 Tab 6    trospium (SANCTURA) 20 mg tablet Take 1 Tab by mouth two (2) times a day. 60 Tab 11    sildenafil citrate (VIAGRA) 100 mg tablet Take 100 mg by mouth as needed.  albuterol (VENTOLIN HFA) 90 mcg/actuation inhaler Take  by inhalation.  tamsulosin (FLOMAX) 0.4 mg capsule Take 1 Cap by mouth daily (after dinner). 90 Cap 3    PARoxetine (PAXIL) 20 mg tablet Take  by mouth daily.  baclofen (LIORESAL) 20 mg tablet Take 20 mg by mouth two (2) times daily as needed.          Past History     Past Medical History:  Past Medical History: Diagnosis Date    Abdominal pain     Benign essential HTN 2/17/2016    Dyslipidemia 1/24/2017    Esophageal reflux     Heartburn     High cholesterol     Incontinence     Prostate cancer screening     Swelling of both lower extremities     Urine incontinence        Past Surgical History:  Past Surgical History:   Procedure Laterality Date    HX CHOLECYSTECTOMY      HX GI      HX HERNIA REPAIR Bilateral     HX TONSILLECTOMY         Family History:  Family History   Problem Relation Age of Onset    Heart Attack Neg Hx     Stroke Neg Hx        Social History:  Social History     Tobacco Use    Smoking status: Never Smoker    Smokeless tobacco: Never Used   Substance Use Topics    Alcohol use: No    Drug use: No       Allergies:  No Known Allergies      Review of Systems   Review of Systems   Genitourinary: Positive for frequency. Musculoskeletal: Positive for arthralgias. All Other Systems Negative  Physical Exam     Vitals:    02/24/20 1130   BP: 134/88   Pulse: 89   Resp: 16   Temp: 98.8 °F (37.1 °C)   SpO2: 97%     Physical Exam  Vitals signs and nursing note reviewed. Constitutional:       General: He is not in acute distress. Appearance: He is well-developed. He is not ill-appearing, toxic-appearing or diaphoretic. HENT:      Head: Normocephalic and atraumatic. Neck:      Musculoskeletal: Normal range of motion and neck supple. Thyroid: No thyromegaly. Vascular: No carotid bruit. Trachea: No tracheal deviation. Cardiovascular:      Rate and Rhythm: Normal rate and regular rhythm. Heart sounds: Normal heart sounds. No murmur. No friction rub. No gallop. Pulmonary:      Effort: Pulmonary effort is normal. No respiratory distress. Breath sounds: Normal breath sounds. No stridor. No wheezing or rales. Chest:      Chest wall: No tenderness. Abdominal:      General: There is no distension. Palpations: Abdomen is soft. There is no mass. Tenderness: There is no abdominal tenderness. There is no guarding or rebound. Genitourinary:     Prostate: Normal.   Musculoskeletal: Normal range of motion. General: Swelling and tenderness present. Comments: Left knee:   Extension 0  Flexion 90 with pain  Positive Khris's. Negative Lockman and anterior drawer. No varus or valgus stressors. Large effusion. Medial and lateral jt line TTP. DP PT pulses palpable. Skin:     General: Skin is warm and dry. Coloration: Skin is not pale. Neurological:      Mental Status: He is alert. Psychiatric:         Speech: Speech normal.         Behavior: Behavior normal.         Thought Content: Thought content normal.         Judgment: Judgment normal.            Diagnostic Study Results     Labs -     Recent Results (from the past 12 hour(s))   URINALYSIS W/ RFLX MICROSCOPIC    Collection Time: 02/24/20 11:02 AM   Result Value Ref Range    Color YELLOW      Appearance CLEAR      Specific gravity 1.024 1.005 - 1.030      pH (UA) 5.0 5.0 - 8.0      Protein NEGATIVE  NEG mg/dL    Glucose NEGATIVE  NEG mg/dL    Ketone TRACE (A) NEG mg/dL    Bilirubin NEGATIVE  NEG      Blood NEGATIVE  NEG      Urobilinogen 1.0 0.2 - 1.0 EU/dL    Nitrites NEGATIVE  NEG      Leukocyte Esterase NEGATIVE  NEG     CRYSTALS, SYNOVIAL FLUID    Collection Time: 02/24/20 12:30 PM   Result Value Ref Range    FLUID TYPE(7) SYNOVIAL FLUID      Crystals, body fluid NEGATIVE      GRAM STAIN    Collection Time: 02/24/20 12:32 PM   Result Value Ref Range    Special Requests: SYNOVIAL FLUID      GRAM STAIN NO ORGANISMS SEEN      GRAM STAIN RARE WBC'S         Radiologic Studies -   XR KNEE RT 3 V   Final Result   IMPRESSION:      Severe degenerative arthritis at the knee with a large joint effusion not   significantly changed from May 15, 2019.         CT Results  (Last 48 hours)    None        CXR Results  (Last 48 hours)    None            Medical Decision Making   I am the first provider for this patient. I reviewed the vital signs, available nursing notes, past medical history, past surgical history, family history and social history. Vital Signs-Reviewed the patient's vital signs. Procedures:  Arthrocentesis  Date/Time: 2/24/2020 3:35 PM  Performed by: MOY Raya  Authorized by: MOY Raya     Consent:     Consent obtained:  Verbal    Consent given by:  Patient    Risks discussed:  Pain and infection    Alternatives discussed:  Referral  Location:     Location:  Knee    Knee:  L knee  Anesthesia (see MAR for exact dosages): Anesthesia method:  Local infiltration    Local anesthetic:  Lidocaine 1% w/o epi  Procedure details:     Needle gauge:  18 G    Ultrasound guidance: no      Approach:  Lateral    Aspirate amount:  70    Aspirate characteristics:  Clear, serous and yellow    Steroid injected: no      Specimen collected: yes    Post-procedure details:     Dressing:  Adhesive bandage (ace wrap)        Provider Notes (Medical Decision Making): synovial fluid sent to lab; no crystals. UA negative. Urine sent for culture as well as GC testing. Refer to ortho. MED RECONCILIATION:  No current facility-administered medications for this encounter. Current Outpatient Medications   Medication Sig    meloxicam (MOBIC) 15 mg tablet Take 15 mg by mouth daily.  furosemide (LASIX) 40 mg tablet Take 1 Tab by mouth two (2) times a day.  simvastatin (ZOCOR) 20 mg tablet Take 1 Tab by mouth nightly.  metoclopramide HCl (REGLAN) 10 mg tablet Take 10 mg by mouth Before breakfast, lunch, dinner and at bedtime.  aspirin delayed-release 81 mg tablet Take  by mouth daily.  carvedilol (COREG) 3.125 mg tablet Take 1 Tab by mouth two (2) times daily (with meals).  trospium (SANCTURA) 20 mg tablet Take 1 Tab by mouth two (2) times a day.  sildenafil citrate (VIAGRA) 100 mg tablet Take 100 mg by mouth as needed.     albuterol (VENTOLIN HFA) 90 mcg/actuation inhaler Take  by inhalation.  tamsulosin (FLOMAX) 0.4 mg capsule Take 1 Cap by mouth daily (after dinner).  PARoxetine (PAXIL) 20 mg tablet Take  by mouth daily.  baclofen (LIORESAL) 20 mg tablet Take 20 mg by mouth two (2) times daily as needed. Disposition:  discharge    Follow-up Information     Follow up With Specialties Details Why Contact Info    Daryl Vora MD Family Practice Schedule an appointment as soon as possible for a visit in 1 day  [facility]  [id number]  [address], [facility] Jossy Joyce Physician Assistant Schedule an appointment as soon as possible for a visit in 2 days  [address] [facility]      Beatriz Rush MD Orthopedic Surgery Schedule an appointment as soon as possible for a visit in 2 days  [address] [place] [address].      [facility] EMERGENCY DEPT Emergency Medicine  If symptoms worsen return immediately [address]  [phone]          Current Discharge Medication List          Diagnosis     Clinical Impression:   1. Effusion, left knee    2.  Urinary frequency

## 2020-02-25 LAB
C TRACH RRNA SPEC QL NAA+PROBE: NEGATIVE
N GONORRHOEA RRNA SPEC QL NAA+PROBE: NEGATIVE
SPECIMEN SOURCE: NORMAL

## 2020-02-26 LAB
BACTERIA SPEC CULT: NORMAL
SERVICE CMNT-IMP: NORMAL

## 2020-02-28 LAB
BACTERIA SPEC CULT: NORMAL
BACTERIA SPEC CULT: NORMAL
GRAM STN SPEC: NORMAL
GRAM STN SPEC: NORMAL
SERVICE CMNT-IMP: NORMAL

## 2020-08-18 ENCOUNTER — OFFICE VISIT (OUTPATIENT)
Dept: CARDIOLOGY CLINIC | Age: 75
End: 2020-08-18

## 2020-08-18 VITALS
SYSTOLIC BLOOD PRESSURE: 126 MMHG | WEIGHT: 205.8 LBS | BODY MASS INDEX: 25.59 KG/M2 | HEIGHT: 75 IN | HEART RATE: 73 BPM | OXYGEN SATURATION: 98 % | DIASTOLIC BLOOD PRESSURE: 94 MMHG | TEMPERATURE: 97.8 F

## 2020-08-18 DIAGNOSIS — E78.5 DYSLIPIDEMIA: ICD-10-CM

## 2020-08-18 DIAGNOSIS — I51.9 LV DYSFUNCTION: ICD-10-CM

## 2020-08-18 DIAGNOSIS — I73.9 PAD (PERIPHERAL ARTERY DISEASE) (HCC): ICD-10-CM

## 2020-08-18 DIAGNOSIS — I25.118 CORONARY ARTERY DISEASE OF NATIVE ARTERY OF NATIVE HEART WITH STABLE ANGINA PECTORIS (HCC): ICD-10-CM

## 2020-08-18 DIAGNOSIS — I50.32 CHRONIC DIASTOLIC HEART FAILURE (HCC): Primary | ICD-10-CM

## 2020-08-18 RX ORDER — LANSOPRAZOLE 30 MG/1
CAPSULE, DELAYED RELEASE ORAL
COMMUNITY

## 2020-08-18 NOTE — PROGRESS NOTES
1. Have you been to the ER, urgent care clinic since your last visit? Hospitalized since your last visit? Yes When: 02/24/20 Where: [facility] Reason for visit: Knee Pain, Urinary Frequency    2. Have you seen or consulted any other health care providers outside of the [address] [place] [address] since your last visit? Include any pap smears or colon screening.  No

## 2020-08-18 NOTE — PROGRESS NOTES
HISTORY OF PRESENT ILLNESS  Erica Scott is a 67 y.o. male. Shortness of Breath   The history is provided by the patient. This is a chronic problem. The problem occurs intermittently. The problem has not changed since onset. Associated symptoms include claudication. Pertinent negatives include no PND and no orthopnea. Claudication   The history is provided by the patient. This is a chronic problem. The current episode started more than 1 week ago. The problem occurs daily. The problem has been gradually improving. Associated symptoms include shortness of breath. The symptoms are aggravated by exertion and walking. The symptoms are relieved by rest. He has tried rest for the symptoms. CHF   The history is provided by the patient. This is a chronic problem. The current episode started more than 1 week ago. The problem occurs daily. The problem has not changed since onset. Associated symptoms include shortness of breath. Review of Systems   Constitutional: Negative for chills and weight loss. HENT: Negative for hearing loss. Eyes: Negative for blurred vision. Respiratory: Positive for shortness of breath. Negative for stridor. Cardiovascular: Positive for claudication. Negative for orthopnea and PND. Gastrointestinal: Negative for heartburn. Musculoskeletal: Negative for myalgias. Neurological: Negative for tingling, tremors, focal weakness and loss of consciousness. Psychiatric/Behavioral: Negative for depression and suicidal ideas.      Family History   Problem Relation Age of Onset    Heart Attack Neg Hx     Stroke Neg Hx        Past Medical History:   Diagnosis Date    Abdominal pain     Benign essential HTN 2/17/2016    Dyslipidemia 1/24/2017    Esophageal reflux     Heartburn     High cholesterol     Incontinence     Prostate cancer screening     Swelling of both lower extremities     Urine incontinence        Past Surgical History:   Procedure Laterality Date    HX CHOLECYSTECTOMY      HX GI      HX HERNIA REPAIR Bilateral     HX TONSILLECTOMY         Social History     Tobacco Use    Smoking status: Never Smoker    Smokeless tobacco: Never Used   Substance Use Topics    Alcohol use: No       No Known Allergies    Outpatient Medications Marked as Taking for the 8/18/20 encounter (Office Visit) with Cate Harp MD   Medication Sig Dispense Refill    lansoprazole (PREVACID) 30 mg capsule Take  by mouth Daily (before breakfast).  furosemide (LASIX) 40 mg tablet Take 1 Tab by mouth two (2) times a day. 60 Tab 3    simvastatin (ZOCOR) 20 mg tablet Take 1 Tab by mouth nightly. 90 Tab 2    metoclopramide HCl (REGLAN) 10 mg tablet Take 10 mg by mouth Before breakfast, lunch, dinner and at bedtime.  aspirin delayed-release 81 mg tablet Take  by mouth daily.  carvedilol (COREG) 3.125 mg tablet Take 1 Tab by mouth two (2) times daily (with meals). 60 Tab 6    trospium (SANCTURA) 20 mg tablet Take 1 Tab by mouth two (2) times a day. 60 Tab 11    albuterol (VENTOLIN HFA) 90 mcg/actuation inhaler Take  by inhalation. Visit Vitals  BP (!) 126/94 (BP 1 Location: Left arm, BP Patient Position: Sitting)   Pulse 73   Temp 97.8 °F (36.6 °C) (Temporal)   Ht 6' 3\" (1.905 m)   Wt 93.4 kg (205 lb 12.8 oz)   SpO2 98%   BMI 25.72 kg/m²     Physical Exam   Constitutional: He is oriented to person, place, and time. He appears well-developed and well-nourished. No distress. HENT:   Head: Atraumatic. Mouth/Throat: No oropharyngeal exudate. Eyes: Conjunctivae are normal. No scleral icterus. Neck: Neck supple. No JVD present. Cardiovascular: Normal rate and regular rhythm. Exam reveals no gallop. No murmur heard. Pulmonary/Chest: Effort normal and breath sounds normal. No stridor. He has no wheezes. He has no rales. Abdominal: Soft. There is no abdominal tenderness. There is no rebound. Musculoskeletal: Normal range of motion.          General: Edema (mild edema) present. Neurological: He is alert and oriented to person, place, and time. He exhibits normal muscle tone. Skin: Skin is warm. He is not diaphoretic. Psychiatric: He has a normal mood and affect. His behavior is normal.     6/16 Cardiac Cath  FINDINGS:  1. Left main is patent and bifurcates into left anterior descending artery  and circumflex artery. 2. Left anterior descending artery has mild ectasia with moderate to severe  stenosis in the proximal portion. It appears to be a napkin ring type  lesion. By IVUS imaging, we obtained a stenosis of 62% with an area of 3.7  mm2. Mid to distal left anterior descending artery had wall irregularities  with sluggish flow, suggestive of severe microvascular disease. Apical LAD  had wall irregularities. 3. Diagonal 1 and diagonal 2 artery appeared to be small caliber vessel  with wall irregularities. 4. Circumflex artery is codominant with sluggish flow consistent with  microvascular disease. 5. Right coronary artery is codominant with sluggish flow suggestive of  microvascular disease. 6. Left ventricular end-diastolic pressure was 13 mmHg.     CONCLUSION: Single-vessel coronary artery disease.  Left anterior  descending artery stenosis was 62% in the proximal portion by intravascular  ultrasound imaging. Normal left ventricular and diastolic pressure. The  patient is to be on intense medical management and risk factor  Modification. 02/01/19   ECHO ADULT COMPLETE 02/04/2019 2/4/2019    Narrative · Left ventricular mildly decreased systolic function. Estimated left   ventricular ejection fraction is 46 - 50%. Left ventricular global   hypokinesis. Mild (grade 1) left ventricular diastolic dysfunction. · Right atrial cavity size is mildly dilated. · Mild aortic valve regurgitation is present. · Mitral valve thickening. Mild mitral valve regurgitation. · Mild tricuspid valve regurgitation is present.  There is no evidence of   pulmonary hypertension. · Mild pulmonic valve regurgitation is present. Signed by: Soniya Gibbons MD     10/17/19   CARDIAC PROCEDURE 10/17/2019 10/17/2019    Narrative Moderate LAD stenosis. Critical stenosis of D3 ( small vessel) with mild   to moderate LV dysfunction. Continue intense risk factor modification     Signed by: Soniya Gibbons MD     ASSESSMENT and PLAN    ICD-10-CM ICD-9-CM    1. Chronic diastolic heart failure (HCC)  I50.32 428.32 AMB POC EKG ROUTINE W/ 12 LEADS, INTER & REP      ECHO ADULT COMPLETE   2. Dyslipidemia  E78.5 272.4    3. Coronary artery disease of native artery of native heart with stable angina pectoris ([facility] [address]  I25.118 414.01      413.9    4. LV dysfunction  I51.9 429.9    5. PAD (peripheral artery disease) ([facility])  I73.9 443.9      Orders Placed This Encounter    AMB POC EKG ROUTINE W/ 12 LEADS, INTER & REP     Order Specific Question:   Reason for Exam:     Answer:   CHF    ECHO ADULT COMPLETE     Standing Status:   Future     Standing Expiration Date:   2/18/2021     Order Specific Question:   Contrast Enhancement (Bubble Study, Definity, Optison) may be used if criteria listed in established evidence-based protocol has been identified. Answer:   Yes     Follow-up and Dispositions    · Return in about 4 months (around 12/18/2020). Patient with LV dysfunction/ abnormal stress test--underwent repeat cardiac catheterization which revealed mid 50% LAD stenosis. FFR was 0.94. Has mild LV dysfunction which is been stable. Seen today for choking sensation and abdominal bloating for the last several days. Scheduled for EGD/ colonoscopy- can proceed with low cardiac risk. On optimal medical therapy  Will repeat echo prior to next appt. Continue salt restriction.

## 2020-10-16 ENCOUNTER — HOSPITAL ENCOUNTER (OUTPATIENT)
Dept: LAB | Age: 75
Discharge: HOME | End: 2020-10-16

## 2020-10-20 ENCOUNTER — ANESTHESIA EVENT (OUTPATIENT)
Dept: ENDOSCOPY | Age: 75
End: 2020-10-20
Payer: MEDICARE

## 2020-10-21 ENCOUNTER — ANESTHESIA (OUTPATIENT)
Dept: ENDOSCOPY | Age: 75
End: 2020-10-21
Payer: MEDICARE

## 2020-10-21 ENCOUNTER — HOSPITAL ENCOUNTER (OUTPATIENT)
Age: 75
Setting detail: OUTPATIENT SURGERY
Discharge: HOME | End: 2020-10-21
Attending: INTERNAL MEDICINE | Admitting: INTERNAL MEDICINE
Payer: MEDICARE

## 2020-10-21 VITALS
BODY MASS INDEX: 27.05 KG/M2 | OXYGEN SATURATION: 100 % | DIASTOLIC BLOOD PRESSURE: 87 MMHG | HEIGHT: 74 IN | WEIGHT: 210.76 LBS | RESPIRATION RATE: 14 BRPM | SYSTOLIC BLOOD PRESSURE: 127 MMHG | TEMPERATURE: 96.7 F | HEART RATE: 64 BPM

## 2020-10-21 LAB
ATRIAL RATE: 59 BPM
CALCULATED P AXIS, ECG09: 29 DEGREES
CALCULATED R AXIS, ECG10: -5 DEGREES
CALCULATED T AXIS, ECG11: -23 DEGREES
DIAGNOSIS, 93000: NORMAL
P-R INTERVAL, ECG05: 152 MS
Q-T INTERVAL, ECG07: 434 MS
QRS DURATION, ECG06: 80 MS
QTC CALCULATION (BEZET), ECG08: 429 MS
VENTRICULAR RATE, ECG03: 59 BPM

## 2020-10-21 PROCEDURE — 77030008565 HC TBNG SUC IRR ERBE -B: Performed by: INTERNAL MEDICINE

## 2020-10-21 PROCEDURE — 00731 ANES UPR GI NDSC PX NOS: CPT | Performed by: ANESTHESIOLOGY

## 2020-10-21 PROCEDURE — 74011000250 HC RX REV CODE- 250: Performed by: NURSE ANESTHETIST, CERTIFIED REGISTERED

## 2020-10-21 PROCEDURE — 77030019988 HC FCPS ENDOSC DISP BSC -B: Performed by: INTERNAL MEDICINE

## 2020-10-21 PROCEDURE — 93005 ELECTROCARDIOGRAM TRACING: CPT

## 2020-10-21 PROCEDURE — 99100 ANES PT EXTEME AGE<1 YR&>70: CPT | Performed by: ANESTHESIOLOGY

## 2020-10-21 PROCEDURE — 76040000019: Performed by: INTERNAL MEDICINE

## 2020-10-21 PROCEDURE — 76060000031 HC ANESTHESIA FIRST 0.5 HR: Performed by: INTERNAL MEDICINE

## 2020-10-21 PROCEDURE — 74011250636 HC RX REV CODE- 250/636: Performed by: NURSE ANESTHETIST, CERTIFIED REGISTERED

## 2020-10-21 PROCEDURE — 74011250636 HC RX REV CODE- 250/636: Performed by: ANESTHESIOLOGY

## 2020-10-21 PROCEDURE — 2709999900 HC NON-CHARGEABLE SUPPLY: Performed by: INTERNAL MEDICINE

## 2020-10-21 RX ORDER — PROPOFOL 10 MG/ML
INJECTION, EMULSION INTRAVENOUS AS NEEDED
Status: DISCONTINUED | OUTPATIENT
Start: 2020-10-21 | End: 2020-10-21 | Stop reason: HOSPADM

## 2020-10-21 RX ORDER — SODIUM CHLORIDE, SODIUM LACTATE, POTASSIUM CHLORIDE, CALCIUM CHLORIDE 600; 310; 30; 20 MG/100ML; MG/100ML; MG/100ML; MG/100ML
25 INJECTION, SOLUTION INTRAVENOUS CONTINUOUS
Status: DISCONTINUED | OUTPATIENT
Start: 2020-10-21 | End: 2020-10-21 | Stop reason: HOSPADM

## 2020-10-21 RX ADMIN — PROPOFOL 30 MG: 10 INJECTION, EMULSION INTRAVENOUS at 12:15

## 2020-10-21 RX ADMIN — PROPOFOL 100 MG: 10 INJECTION, EMULSION INTRAVENOUS at 12:14

## 2020-10-21 RX ADMIN — SODIUM CHLORIDE, SODIUM LACTATE, POTASSIUM CHLORIDE, AND CALCIUM CHLORIDE 25 ML/HR: 600; 310; 30; 20 INJECTION, SOLUTION INTRAVENOUS at 11:04

## 2020-10-21 RX ADMIN — FAMOTIDINE 20 MG: 10 INJECTION INTRAVENOUS at 11:04

## 2020-10-21 NOTE — PROCEDURES
[place]  [facility], [address]      Brief Procedure Note    Blank Godinez  [birth year]  [id number]    Date of Procedure: 10/21/2020    Preoperative diagnosis: Dysphagia:  787.20 - R13.10  Colorectal cancer Screening:  V76.51 - O51.93  Chronic diastolic heart failure:  060.64 - I50.32  Gastroesophageal reflux disease:  530.81 - K21.9  Bloating symptom:  787.3 - R14.0  Body massi ndex 25.0 - 25.9, adult:   V85.21 - Z68.25    Postoperative diagnosis:  Grade 1 esophagitis, hiatal hernia    Type of Anesthesia: MAC (monitered anesthesia care)    Description of Findings: same as post op dx    Procedure: Procedure(s):  UPPER ENDOSCOPY    :  Dr. Arben Beckford MD    Assistant(s): [unfilled]    Type of Anesthesia:MAC     EBL:None, no implants.     Specimens: None    Findings: See printed and scanned procedure note    Complications: None    Dr. Arben Beckford MD  10/21/2020  12:22 PM

## 2020-10-21 NOTE — DISCHARGE INSTRUCTIONS
DISCHARGE SUMMARY from Nurse  PATIENT INSTRUCTIONS:  After general anesthesia or intravenous sedation, for 24 hours or while taking prescription Narcotics:  · Limit your activities  · Do not drive and operate hazardous machinery  · Do not make important personal or business decisions  · Do  not drink alcoholic beverages  · If you have not urinated within 8 hours after discharge, please contact your surgeon on call. Report the following to your surgeon:  · Excessive pain, swelling, redness or odor of or around the surgical area  · Temperature over 100.5  · Nausea and vomiting lasting longer than 4 hours or if unable to take medications  · Any signs of decreased circulation or nerve impairment to extremity: change in color, persistent  numbness, tingling, coldness or increase pain  · Any questions    What to do at Home:  Recommended activity: Activity as tolerated and no driving for today. *  Please give a list of your current medications to your Primary Care Provider. *  Please update this list whenever your medications are discontinued, doses are      changed, or new medications (including over-the-counter products) are added. *  Please carry medication information at all times in case of emergency situations. These are general instructions for a healthy lifestyle:    No smoking/ No tobacco products/ Avoid exposure to second hand smoke  Surgeon General's Warning:  Quitting smoking now greatly reduces serious risk to your health. Obesity, smoking, and sedentary lifestyle greatly increases your risk for illness    A healthy diet, regular physical exercise & weight monitoring are important for maintaining a healthy lifestyle    You may be retaining fluid if you have a history of heart failure or if you experience any of the following symptoms:  Weight gain of 3 pounds or more overnight or 5 pounds in a week, increased swelling in our hands or feet or shortness of breath while lying flat in bed.   Please call your doctor as soon as you notice any of these symptoms; do not wait until your next office visit. The discharge information has been reviewed with the patient. The patient verbalized understanding. Discharge medications reviewed with the patient and appropriate educational materials and side effects teaching were provided. ___________________________________________________________________________________________________________________________________    Patient Education   Upper GI Endoscopy: What to Expect at [address] [place] [address] had an upper GI endoscopy. Your doctor used a thin, lighted tube that bends to look at the inside of your esophagus, your stomach, and the first part of the small intestine, called the duodenum. After you have an endoscopy, you will stay at the hospital or clinic for 1 to 2 hours. This will allow the medicine to wear off. You will be able to go home after your doctor or nurse checks to make sure that you're not having any problems. You may have to stay overnight if you had treatment during the test. You may have a sore throat for a day or two after the test.  This care sheet gives you a general idea about what to expect after the test.  How can you care for yourself at home? Activity   · Rest as much as you need to after you go home. · You should be able to go back to your usual activities the day after the test.  Diet   · Follow your doctor's directions for eating after the test.  · Drink plenty of fluids (unless your doctor has told you not to). Medications   · If you have a sore throat the day after the test, use an over-the-counter spray to numb your throat. Follow-up care is a key part of your treatment and safety. Be sure to make and go to all appointments, and call your doctor if you are having problems. It's also a good idea to know your test results and keep a list of the medicines you take. When should you call for help?    Call [phone] anytime you think you may need emergency care. For example, call if:    · You passed out (lost consciousness).     · You have trouble breathing.     · You pass maroon or bloody stools. Call your doctor now or seek immediate medical care if:    · You have pain that does not get better after your take pain medicine.     · You have new or worse belly pain.     · You have blood in your stools.     · You are sick to your stomach and cannot keep fluids down.     · You have a fever.     · You cannot pass stools or gas. Watch closely for changes in your health, and be sure to contact your doctor if:    · Your throat still hurts after a day or two.     · You do not get better as expected. Where can you learn more? Go to http://www.crowe.com/  Enter J454 in the search box to learn more about \"Upper GI Endoscopy: What to Expect at Home. \"  Current as of: April 15, 2020               Content Version: 12.6  © 7042-1003 [employer]. Care instructions adapted under license by [employer] (which disclaims liability or warranty for this information). If you have questions about a medical condition or this instruction, always ask your healthcare professional. [place] [address] any warranty or liability for your use of this information. Patient Education   Esophagitis: Care Instructions  Your Care Instructions     Esophagitis (say \"ih-sof-uh-JY-tus\") is irritation of the esophagus, the tube that carries food from your throat to your stomach. Acid reflux is the most common cause of this condition. When you have reflux, stomach acid and juices flow upward. This can cause pain or a burning feeling in your chest. You may have a sore throat. It may be hard to swallow. Other causes of this condition include some medicines and supplements. Allergies or an infection can also cause it. Your doctor will ask about your symptoms and past health.  He or she might do tests to find the cause of your symptoms. Treatment depends on what is causing the problem. Treatment might include changing your diet or taking medicine to relieve your symptoms. It might also include changing a medicine that is causing your symptoms. If you have reflux, medicine that reduces the stomach acid helps your body heal. It might take 1 to 3 weeks to heal.  Follow-up care is a key part of your treatment and safety. Be sure to make and go to all appointments, and call your doctor if you are having problems. It's also a good idea to know your test results and keep a list of the medicines you take. How can you care for yourself at home? · If you have acid reflux, your doctor may recommend that you:  ? Eat several small meals instead of two or three large meals. After you eat, wait 2 to 3 hours before you lie down. ? Avoid chocolate, mint, alcohol, and spicy foods. ? Don't smoke or use smokeless tobacco. Smoking can make this condition worse. If you need help quitting, talk to your doctor about stop-smoking programs and medicines. These can increase your chances of quitting for good. ? Raise the head of your bed 6 to 8 inches if you have symptoms at night. ? Lose weight if you are overweight. ? Take an over-the-counter antacid, such as Maalox, Mylanta, or Tums. Be careful when you take over-the-counter antacid medicines. Many of these medicines have aspirin in them. Read the label to make sure that you are not taking more than the recommended dose. Too much aspirin can be harmful. ? Take stronger acid reducers. Examples are famotidine (such as Pepcid) and omeprazole (such as Prilosec). · If your condition is caused by infection, allergy, or other problems, use the medicine or treatments that your doctor recommends. · Be safe with medicines. Take your medicines exactly as prescribed. Call your doctor if you think you are having a problem with your medicine. When should you call for help?    Call your doctor now or seek immediate medical care if:    · You have new or worse belly pain.     · You are vomiting. Watch closely for changes in your health, and be sure to contact your doctor if:    · You have new or worse symptoms of reflux.     · You have trouble or pain swallowing.     · You are losing weight.     · You do not get better as expected. Where can you learn more? Go to http://www.gray.com/  Enter N977 in the search box to learn more about \"Esophagitis: Care Instructions. \"  Current as of: April 15, 2020               Content Version: 12.6  © 1553-0759 [employer]. Care instructions adapted under license by [employer] (which disclaims liability or warranty for this information). If you have questions about a medical condition or this instruction, always ask your healthcare professional. [address] any warranty or liability for your use of this information. Patient Education   Hiatal Hernia: Care Instructions  Your Care Instructions  A hiatal hernia occurs when part of the stomach bulges into the chest cavity. A hiatal hernia may allow stomach acid and juices to back up into the esophagus (acid reflux). This can cause a feeling of burning, warmth, heat, or pain behind the breastbone. This feeling may often occur after you eat, soon after you lie down, or when you bend forward, and it may come and go. You also may have a sour taste in your mouth. These symptoms are commonly known as heartburn or reflux. But not all hiatal hernias cause symptoms. Follow-up care is a key part of your treatment and safety. Be sure to make and go to all appointments, and call your doctor if you are having problems. It's also a good idea to know your test results and keep a list of the medicines you take. How can you care for yourself at home? · Take your medicines exactly as prescribed.  Call your doctor if you think you are having a problem with your medicine. · Do not take aspirin or other nonsteroidal anti-inflammatory drugs (NSAIDs), such as ibuprofen (Advil, Motrin) or naproxen (Aleve), unless your doctor says it is okay. Ask your doctor what you can take for pain. · Your doctor may recommend over-the-counter medicine. For mild or occasional indigestion, antacids such as Tums, Gaviscon, Maalox, or Mylanta may help. Your doctor also may recommend over-the-counter acid reducers, such as famotidine (Pepcid AC), cimetidine (Tagamet HB), or omeprazole (Prilosec). Read and follow all instructions on the label. If you use these medicines often, talk with your doctor. · Change your eating habits. ? It's best to eat several small meals instead of two or three large meals. ? After you eat, wait 2 to 3 hours before you lie down. Late-night snacks aren't a good idea. ? Chocolate, mint, and alcohol can make heartburn worse. They relax the valve between the esophagus and the stomach. ? Spicy foods, foods that have a lot of acid (like tomatoes and oranges), and coffee can make heartburn symptoms worse in some people. If your symptoms are worse after you eat a certain food, you may want to stop eating that food to see if your symptoms get better. · Do not smoke or chew tobacco.  · If you get heartburn at night, raise the head of your bed 6 to 8 inches by putting the frame on blocks or placing a foam wedge under the head of your mattress. (Adding extra pillows does not work.)  · Do not wear tight clothing around your middle. · Lose weight if you need to. Losing just 5 to 10 pounds can help. When should you call for help? Call your doctor now or seek immediate medical care if:    · You have new or worse belly pain.     · You are vomiting.    Watch closely for changes in your health, and be sure to contact your doctor if:    · You have new or worse symptoms of indigestion.     · You have trouble or pain swallowing.     · You are losing weight.     · You do not get better as expected. Where can you learn more? Go to http://www.cartmi.com/  Enter T074 in the search box to learn more about \"Hiatal Hernia: Care Instructions. \"  Current as of: April 15, 2020               Content Version: 12.6  © 6388-8756 [employer]. Care instructions adapted under license by [employer] (which disclaims liability or warranty for this information). If you have questions about a medical condition or this instruction, always ask your healthcare professional. [address] any warranty or liability for your use of this information.

## 2020-10-21 NOTE — ANESTHESIA POSTPROCEDURE EVALUATION
Procedure(s):  UPPER ENDOSCOPY. MAC    Anesthesia Post Evaluation      Multimodal analgesia: multimodal analgesia used between 6 hours prior to anesthesia start to PACU discharge  Patient location during evaluation: PACU  Patient participation: complete - patient participated  Level of consciousness: awake  Pain management: adequate  Airway patency: patent  Anesthetic complications: no  Cardiovascular status: acceptable  Respiratory status: acceptable  Hydration status: acceptable  Post anesthesia nausea and vomiting:  none      INITIAL Post-op Vital signs:   Vitals Value Taken Time   BP 97/67 10/21/2020 12:44 PM   Temp     Pulse 59 10/21/2020 12:50 PM   Resp 16 10/21/2020 12:50 PM   SpO2 100 % 10/21/2020 12:50 PM   Vitals shown include unvalidated device data.

## 2020-10-21 NOTE — PERIOP NOTES
EKG done Dr. Lucila Beltrán reviewed it no more action needed patient may be D/C'd to phase II then home per Dr. Lucila Beltrán.

## 2020-10-21 NOTE — H&P
Chief Complaint: Dysphagia    History of present illness: Has had dysphagia to solids. Heartburn symptoms from years. PMH:   Past Medical History:   Diagnosis Date    Abdominal pain     Benign essential HTN 2/17/2016    Dyslipidemia 1/24/2017    Esophageal reflux     Heartburn     High cholesterol     Incontinence     Prostate cancer screening     Swelling of both lower extremities     Urine incontinence      Allergies: No Known Allergies  Medications:   Current Facility-Administered Medications:     lactated Ringers infusion, 25 mL/hr, IntraVENous, CONTINUOUS, Lb Arriaza CRNA, Last Rate: 25 mL/hr at 10/21/20 1104, 25 mL/hr at 10/21/20 1104  FH:   Family History   Problem Relation Age of Onset    Heart Attack Neg Hx     Stroke Neg Hx      Social:   Social History     Socioeconomic History    Marital status: SINGLE     Spouse name: Not on file    Number of children: Not on file    Years of education: Not on file    Highest education level: Not on file   Tobacco Use    Smoking status: Never Smoker    Smokeless tobacco: Never Used   Substance and Sexual Activity    Alcohol use: No    Drug use: No     Surgical H:   Past Surgical History:   Procedure Laterality Date    HX CHOLECYSTECTOMY      HX GI      HX HERNIA REPAIR Bilateral     HX TONSILLECTOMY         ROS: positive for reflux symptoms    Physical Exam:   Visit Vitals  BP (!) 142/82   Pulse 67   Temp 97.8 °F (36.6 °C)   Resp 20   Ht 6' 2\" (1.88 m)   Wt 95.6 kg (210 lb 12.2 oz)   SpO2 100%   BMI 27.06 kg/m²     General appearance: alert, no distress  Eyes: pupils equal and reactive, extraocular eye movements intact  Nodes: No gross adenopathy in neck.   Skin: no spider angiomata, jaundice, palmar erythema   Respiratory: clear to auscultation bilaterally  Cardiovascular: regular heart rate, no murmurs, no JVD, normal rate and regular rhythm  Abdomen: soft, non-tender, liver not enlarged, spleen not palpable, no obvious ascites  Extremities: no muscle wasting, no gross arthritic changes  Neurologic: alert and oriented, cranial nerves grossly intact, no asterixis    Labs: No results found for this or any previous visit (from the past 24 hour(s)). Imp/ Plan: Will proceed with EGD as planned. Risk benefits alternative including but not limited to infection, bleeding, perforation of viscous, allergic reaction and resultant morbidity and mortality was discussed. Chance of missing a significant lesion due to various reasons were discussed.       Bel Barr MD  [facility] Christie Vicente 1948, [facility] [place] [facility]

## 2020-10-21 NOTE — ANESTHESIA PREPROCEDURE EVALUATION
Relevant Problems   No relevant active problems       Anesthetic History   No history of anesthetic complications            Review of Systems / Medical History  Patient summary reviewed and pertinent labs reviewed    Pulmonary  Within defined limits                 Neuro/Psych   Within defined limits           Cardiovascular    Hypertension: well controlled          CAD, PAD and hyperlipidemia         GI/Hepatic/Renal     GERD           Endo/Other  Within defined limits           Other Findings              Physical Exam    Airway    TM Distance: > 6 cm  Neck ROM: normal range of motion   Mouth opening: Normal     Cardiovascular    Rhythm: regular  Rate: normal         Dental    Dentition: Edentulous     Pulmonary                 Abdominal         Other Findings            Anesthetic Plan    ASA: 3  Anesthesia type: MAC            Anesthetic plan and risks discussed with: Patient

## 2020-12-15 ENCOUNTER — OFFICE VISIT (OUTPATIENT)
Dept: CARDIOLOGY CLINIC | Age: 75
End: 2020-12-15
Payer: MEDICARE

## 2020-12-15 VITALS
BODY MASS INDEX: 27.62 KG/M2 | HEIGHT: 74 IN | HEART RATE: 59 BPM | DIASTOLIC BLOOD PRESSURE: 82 MMHG | SYSTOLIC BLOOD PRESSURE: 93 MMHG | WEIGHT: 215.2 LBS | OXYGEN SATURATION: 100 % | TEMPERATURE: 97.7 F

## 2020-12-15 DIAGNOSIS — I50.32 CHRONIC DIASTOLIC HEART FAILURE (HCC): Primary | ICD-10-CM

## 2020-12-15 DIAGNOSIS — K21.9 GASTROESOPHAGEAL REFLUX DISEASE WITHOUT ESOPHAGITIS: ICD-10-CM

## 2020-12-15 DIAGNOSIS — E78.5 DYSLIPIDEMIA: ICD-10-CM

## 2020-12-15 DIAGNOSIS — I51.9 LV DYSFUNCTION: ICD-10-CM

## 2020-12-15 DIAGNOSIS — I73.9 PAD (PERIPHERAL ARTERY DISEASE) (HCC): ICD-10-CM

## 2020-12-15 DIAGNOSIS — I25.118 CORONARY ARTERY DISEASE OF NATIVE ARTERY OF NATIVE HEART WITH STABLE ANGINA PECTORIS (HCC): ICD-10-CM

## 2020-12-15 PROCEDURE — G8432 DEP SCR NOT DOC, RNG: HCPCS | Performed by: INTERNAL MEDICINE

## 2020-12-15 PROCEDURE — G8752 SYS BP LESS 140: HCPCS | Performed by: INTERNAL MEDICINE

## 2020-12-15 PROCEDURE — 1101F PT FALLS ASSESS-DOCD LE1/YR: CPT | Performed by: INTERNAL MEDICINE

## 2020-12-15 PROCEDURE — G8754 DIAS BP LESS 90: HCPCS | Performed by: INTERNAL MEDICINE

## 2020-12-15 PROCEDURE — G8419 CALC BMI OUT NRM PARAM NOF/U: HCPCS | Performed by: INTERNAL MEDICINE

## 2020-12-15 PROCEDURE — 99214 OFFICE O/P EST MOD 30 MIN: CPT | Performed by: INTERNAL MEDICINE

## 2020-12-15 PROCEDURE — G8428 CUR MEDS NOT DOCUMENT: HCPCS | Performed by: INTERNAL MEDICINE

## 2020-12-15 PROCEDURE — G8536 NO DOC ELDER MAL SCRN: HCPCS | Performed by: INTERNAL MEDICINE

## 2020-12-15 RX ORDER — SIMVASTATIN 20 MG/1
20 TABLET, FILM COATED ORAL
Qty: 30 TAB | Refills: 6 | Status: SHIPPED | OUTPATIENT
Start: 2020-12-15 | End: 2021-04-28 | Stop reason: SDUPTHER

## 2020-12-15 RX ORDER — METOPROLOL TARTRATE 25 MG/1
12.5 TABLET, FILM COATED ORAL 2 TIMES DAILY
Qty: 30 TAB | Refills: 6 | Status: SHIPPED | OUTPATIENT
Start: 2020-12-15

## 2020-12-15 NOTE — PROGRESS NOTES
1. Have you been to the ER, urgent care clinic since your last visit? Hospitalized since your last visit?     no    2. Have you seen or consulted any other health care providers outside of the [address] [place] [address] since your last visit? Include any pap smears or colon screening. Yes When: x 2-3 months ago with PCP      3. Do you need any refills today?    no

## 2020-12-15 NOTE — PROGRESS NOTES
HISTORY OF PRESENT ILLNESS  Miguel Calderon is a 67 y.o. male. CHF   The history is provided by the patient. This is a chronic problem. The current episode started more than 1 week ago. The problem occurs daily. The problem has not changed since onset. Associated symptoms include shortness of breath. Shortness of Breath   The history is provided by the patient. This is a chronic problem. The problem occurs intermittently. The problem has not changed since onset. Associated symptoms include claudication. Pertinent negatives include no PND and no orthopnea. Claudication   The history is provided by the patient. This is a chronic problem. The current episode started more than 1 week ago. The problem occurs daily. The problem has been gradually improving. Associated symptoms include shortness of breath. The symptoms are aggravated by exertion and walking. The symptoms are relieved by rest. He has tried rest for the symptoms. Review of Systems   Constitutional: Negative for chills and weight loss. HENT: Negative for hearing loss. Eyes: Negative for blurred vision. Respiratory: Positive for shortness of breath. Negative for stridor. Cardiovascular: Positive for claudication. Negative for orthopnea and PND. Gastrointestinal: Negative for heartburn. Musculoskeletal: Negative for myalgias. Neurological: Negative for tingling, tremors, focal weakness and loss of consciousness. Psychiatric/Behavioral: Negative for depression and suicidal ideas.      Family History   Problem Relation Age of Onset    Heart Attack Neg Hx     Stroke Neg Hx        Past Medical History:   Diagnosis Date    Abdominal pain     Benign essential HTN 2/17/2016    Dyslipidemia 1/24/2017    Esophageal reflux     Heartburn     High cholesterol     Incontinence     Prostate cancer screening     Swelling of both lower extremities     Urine incontinence        Past Surgical History:   Procedure Laterality Date    HX CHOLECYSTECTOMY      HX GI      HX HERNIA REPAIR Bilateral     HX TONSILLECTOMY         Social History     Tobacco Use    Smoking status: Never Smoker    Smokeless tobacco: Never Used   Substance Use Topics    Alcohol use: No       No Known Allergies    Outpatient Medications Marked as Taking for the 12/15/20 encounter (Office Visit) with Reji Mejia MD   Medication Sig Dispense Refill    aspirin delayed-release 81 mg tablet Take  by mouth daily.  metoprolol tartrate (LOPRESSOR) 25 mg tablet Take 0.5 Tabs by mouth two (2) times a day. 30 Tab 6    simvastatin (ZOCOR) 20 mg tablet Take 1 Tab by mouth nightly. 30 Tab 6        Visit Vitals  BP 93/82 (BP 1 Location: Left arm, BP Patient Position: Sitting)   Pulse (!) 59   Temp 97.7 °F (36.5 °C) (Temporal)   Ht 6' 2\" (1.88 m)   Wt 97.6 kg (215 lb 3.2 oz)   SpO2 100%   BMI 27.63 kg/m²     Physical Exam   Constitutional: He is oriented to person, place, and time. He appears well-developed and well-nourished. No distress. HENT:   Head: Atraumatic. Mouth/Throat: No oropharyngeal exudate. Eyes: Conjunctivae are normal. No scleral icterus. Neck: Neck supple. No JVD present. Cardiovascular: Normal rate and regular rhythm. Exam reveals no gallop. No murmur heard. Pulmonary/Chest: Effort normal and breath sounds normal. No stridor. He has no wheezes. He has no rales. Abdominal: Soft. There is no abdominal tenderness. There is no rebound. Musculoskeletal: Normal range of motion. General: Edema (mild edema) present. Neurological: He is alert and oriented to person, place, and time. He exhibits normal muscle tone. Skin: Skin is warm. He is not diaphoretic. Psychiatric: He has a normal mood and affect. His behavior is normal.     6/16 Cardiac Cath  FINDINGS:  1. Left main is patent and bifurcates into left anterior descending artery  and circumflex artery.   2. Left anterior descending artery has mild ectasia with moderate to severe  stenosis in the proximal portion. It appears to be a napkin ring type  lesion. By IVUS imaging, we obtained a stenosis of 62% with an area of 3.7  mm2. Mid to distal left anterior descending artery had wall irregularities  with sluggish flow, suggestive of severe microvascular disease. Apical LAD  had wall irregularities. 3. Diagonal 1 and diagonal 2 artery appeared to be small caliber vessel  with wall irregularities. 4. Circumflex artery is codominant with sluggish flow consistent with  microvascular disease. 5. Right coronary artery is codominant with sluggish flow suggestive of  microvascular disease. 6. Left ventricular end-diastolic pressure was 13 mmHg.     CONCLUSION: Single-vessel coronary artery disease.  Left anterior  descending artery stenosis was 62% in the proximal portion by intravascular  ultrasound imaging. Normal left ventricular and diastolic pressure. The  patient is to be on intense medical management and risk factor  Modification. 02/01/19   ECHO ADULT COMPLETE 02/04/2019 2/4/2019    Narrative · Left ventricular mildly decreased systolic function. Estimated left   ventricular ejection fraction is 46 - 50%. Left ventricular global   hypokinesis. Mild (grade 1) left ventricular diastolic dysfunction. · Right atrial cavity size is mildly dilated. · Mild aortic valve regurgitation is present. · Mitral valve thickening. Mild mitral valve regurgitation. · Mild tricuspid valve regurgitation is present. There is no evidence of   pulmonary hypertension. · Mild pulmonic valve regurgitation is present. Signed by: Josse Plunkett MD     10/17/19   CARDIAC PROCEDURE 10/17/2019 10/17/2019    Narrative Moderate LAD stenosis. Critical stenosis of D3 ( small vessel) with mild   to moderate LV dysfunction. Continue intense risk factor modification     Signed by: Josse Plunkett MD     ASSESSMENT and PLAN    ICD-10-CM ICD-9-CM    1.  Chronic diastolic heart failure ([address] I50.32 428.32    2. Dyslipidemia  E78.5 272.4 LIPID PANEL      HEPATIC FUNCTION PANEL   3. Coronary artery disease of native artery of native heart with stable angina pectoris ([facility] [address]  I25.118 414.01      413.9    4. LV dysfunction  I51.9 429.9    5. PAD (peripheral artery disease) (HCC)  I73.9 443.9    6. Gastroesophageal reflux disease without esophagitis  K21.9 530.81      Orders Placed This Encounter    LIPID PANEL     Standing Status:   Future     Standing Expiration Date:   12/16/2021    HEPATIC FUNCTION PANEL     Standing Status:   Future     Standing Expiration Date:   12/16/2021    metoprolol tartrate (LOPRESSOR) 25 mg tablet     Sig: Take 0.5 Tabs by mouth two (2) times a day. Dispense:  30 Tab     Refill:  6    simvastatin (ZOCOR) 20 mg tablet     Sig: Take 1 Tab by mouth nightly. Dispense:  30 Tab     Refill:  6     Follow-up and Dispositions    · Return in about 3 months (around 3/15/2021). Patient with LV dysfunction/ abnormal stress test--underwent repeat cardiac catheterization which revealed mid 50% LAD stenosis. FFR was 0.94. Has mild LV dysfunction which is been stable. Seen today for choking sensation and abdominal bloating for the last several days. EGD revealed esophagitis with hiatal hernia  Will resume low dose Bb and statin.   F/u in 3 months with lipid and liver panel

## 2021-03-16 ENCOUNTER — OFFICE VISIT (OUTPATIENT)
Dept: CARDIOLOGY CLINIC | Age: 76
End: 2021-03-16
Payer: MEDICARE

## 2021-03-16 VITALS
BODY MASS INDEX: 27.44 KG/M2 | DIASTOLIC BLOOD PRESSURE: 89 MMHG | HEIGHT: 74 IN | OXYGEN SATURATION: 99 % | HEART RATE: 80 BPM | TEMPERATURE: 96.8 F | WEIGHT: 213.8 LBS | SYSTOLIC BLOOD PRESSURE: 124 MMHG

## 2021-03-16 DIAGNOSIS — I10 BENIGN ESSENTIAL HTN: ICD-10-CM

## 2021-03-16 DIAGNOSIS — E78.5 DYSLIPIDEMIA: ICD-10-CM

## 2021-03-16 DIAGNOSIS — I73.9 PAD (PERIPHERAL ARTERY DISEASE) (HCC): ICD-10-CM

## 2021-03-16 DIAGNOSIS — I25.118 CORONARY ARTERY DISEASE OF NATIVE ARTERY OF NATIVE HEART WITH STABLE ANGINA PECTORIS (HCC): ICD-10-CM

## 2021-03-16 DIAGNOSIS — I50.32 CHRONIC DIASTOLIC HEART FAILURE (HCC): Primary | ICD-10-CM

## 2021-03-16 DIAGNOSIS — K21.9 GASTROESOPHAGEAL REFLUX DISEASE WITHOUT ESOPHAGITIS: ICD-10-CM

## 2021-03-16 DIAGNOSIS — I51.9 LV DYSFUNCTION: ICD-10-CM

## 2021-03-16 PROCEDURE — G8536 NO DOC ELDER MAL SCRN: HCPCS | Performed by: INTERNAL MEDICINE

## 2021-03-16 PROCEDURE — G8419 CALC BMI OUT NRM PARAM NOF/U: HCPCS | Performed by: INTERNAL MEDICINE

## 2021-03-16 PROCEDURE — G8752 SYS BP LESS 140: HCPCS | Performed by: INTERNAL MEDICINE

## 2021-03-16 PROCEDURE — G8754 DIAS BP LESS 90: HCPCS | Performed by: INTERNAL MEDICINE

## 2021-03-16 PROCEDURE — 1101F PT FALLS ASSESS-DOCD LE1/YR: CPT | Performed by: INTERNAL MEDICINE

## 2021-03-16 PROCEDURE — G8428 CUR MEDS NOT DOCUMENT: HCPCS | Performed by: INTERNAL MEDICINE

## 2021-03-16 PROCEDURE — G8432 DEP SCR NOT DOC, RNG: HCPCS | Performed by: INTERNAL MEDICINE

## 2021-03-16 PROCEDURE — 99214 OFFICE O/P EST MOD 30 MIN: CPT | Performed by: INTERNAL MEDICINE

## 2021-03-16 RX ORDER — ONDANSETRON 4 MG/1
4 TABLET, ORALLY DISINTEGRATING ORAL
COMMUNITY
End: 2021-09-15

## 2021-03-16 RX ORDER — CARVEDILOL 3.12 MG/1
3.12 TABLET ORAL 2 TIMES DAILY WITH MEALS
COMMUNITY
End: 2021-09-15

## 2021-03-16 NOTE — PROGRESS NOTES
1. Have you been to the ER, urgent care clinic since your last visit? Hospitalized since your last visit? No    2. Have you seen or consulted any other health care providers outside of the [address] [place] [address] since your last visit? Include any pap smears or colon screening.  Yes Where: Gastroenterology

## 2021-03-16 NOTE — PROGRESS NOTES
HISTORY OF PRESENT ILLNESS  Carolina Short is a 67 y.o. male. Claudication  The history is provided by the patient. This is a chronic problem. The current episode started more than 1 week ago. The problem occurs daily. The problem has been gradually improving. Associated symptoms include shortness of breath. The symptoms are aggravated by exertion and walking. The symptoms are relieved by rest. He has tried rest for the symptoms. CHF  The history is provided by the patient. This is a chronic problem. The current episode started more than 1 week ago. The problem occurs daily. The problem has been gradually improving. Associated symptoms include shortness of breath. Shortness of Breath  The history is provided by the patient. This is a chronic problem. The problem occurs intermittently. The problem has not changed since onset. Associated symptoms include claudication. Pertinent negatives include no PND and no orthopnea. Hypertension  Associated symptoms include shortness of breath. Review of Systems   Constitutional: Negative for chills and weight loss. HENT: Negative for hearing loss. Eyes: Negative for blurred vision. Respiratory: Positive for shortness of breath. Negative for stridor. Cardiovascular: Positive for claudication. Negative for orthopnea and PND. Gastrointestinal: Negative for heartburn. Musculoskeletal: Negative for myalgias. Neurological: Negative for tingling, tremors, focal weakness and loss of consciousness. Psychiatric/Behavioral: Negative for depression and suicidal ideas.      Family History   Problem Relation Age of Onset    Heart Attack Neg Hx     Stroke Neg Hx        Past Medical History:   Diagnosis Date    Abdominal pain     Benign essential HTN 2/17/2016    Dyslipidemia 1/24/2017    Esophageal reflux     Heartburn     High cholesterol     Incontinence     Prostate cancer screening     Swelling of both lower extremities     Urine incontinence Past Surgical History:   Procedure Laterality Date    HX CHOLECYSTECTOMY      HX GI      HX HERNIA REPAIR Bilateral     HX TONSILLECTOMY         Social History     Tobacco Use    Smoking status: Never Smoker    Smokeless tobacco: Never Used   Substance Use Topics    Alcohol use: No       No Known Allergies    Outpatient Medications Marked as Taking for the 3/16/21 encounter (Office Visit) with Jazmyne Bowers MD   Medication Sig Dispense Refill    carvediloL (COREG) 3.125 mg tablet Take 3.125 mg by mouth two (2) times daily (with meals).  ondansetron (ZOFRAN ODT) 4 mg disintegrating tablet Take 4 mg by mouth every eight (8) hours as needed for Nausea or Vomiting.  psyllium (METAMUCIL) powd Take  by mouth.  metoprolol tartrate (LOPRESSOR) 25 mg tablet Take 0.5 Tabs by mouth two (2) times a day. 30 Tab 6    simvastatin (ZOCOR) 20 mg tablet Take 1 Tab by mouth nightly. 30 Tab 6    lansoprazole (PREVACID) 30 mg capsule Take  by mouth Daily (before breakfast).  furosemide (LASIX) 40 mg tablet Take 1 Tab by mouth two (2) times a day. 60 Tab 3    aspirin delayed-release 81 mg tablet Take  by mouth daily. Visit Vitals  /89 (BP 1 Location: Left arm, BP Patient Position: Sitting, BP Cuff Size: Adult)   Pulse 80   Temp 96.8 °F (36 °C) (Temporal)   Ht 6' 2\" (1.88 m)   Wt 97 kg (213 lb 12.8 oz)   SpO2 99%   BMI 27.45 kg/m²     Physical Exam   Constitutional: He is oriented to person, place, and time. He appears well-developed and well-nourished. No distress. HENT:   Head: Atraumatic. Mouth/Throat: No oropharyngeal exudate. Eyes: Conjunctivae are normal. No scleral icterus. Neck: Neck supple. No JVD present. Cardiovascular: Normal rate and regular rhythm. Exam reveals no gallop. No murmur heard. Pulmonary/Chest: Effort normal and breath sounds normal. No stridor. He has no wheezes. He has no rales. Abdominal: Soft. There is no abdominal tenderness.  There is no rebound. Musculoskeletal: Normal range of motion. General: Edema (mild edema) present. Neurological: He is alert and oriented to person, place, and time. He exhibits normal muscle tone. Skin: Skin is warm. He is not diaphoretic. Psychiatric: He has a normal mood and affect. His behavior is normal.     6/16 Cardiac Cath  FINDINGS:  1. Left main is patent and bifurcates into left anterior descending artery  and circumflex artery. 2. Left anterior descending artery has mild ectasia with moderate to severe  stenosis in the proximal portion. It appears to be a napkin ring type  lesion. By IVUS imaging, we obtained a stenosis of 62% with an area of 3.7  mm2. Mid to distal left anterior descending artery had wall irregularities  with sluggish flow, suggestive of severe microvascular disease. Apical LAD  had wall irregularities. 3. Diagonal 1 and diagonal 2 artery appeared to be small caliber vessel  with wall irregularities. 4. Circumflex artery is codominant with sluggish flow consistent with  microvascular disease. 5. Right coronary artery is codominant with sluggish flow suggestive of  microvascular disease. 6. Left ventricular end-diastolic pressure was 13 mmHg.     CONCLUSION: Single-vessel coronary artery disease.  Left anterior  descending artery stenosis was 62% in the proximal portion by intravascular  ultrasound imaging. Normal left ventricular and diastolic pressure. The  patient is to be on intense medical management and risk factor  Modification. 02/01/19   ECHO ADULT COMPLETE 02/04/2019 2/4/2019    Narrative · Left ventricular mildly decreased systolic function. Estimated left   ventricular ejection fraction is 46 - 50%. Left ventricular global   hypokinesis. Mild (grade 1) left ventricular diastolic dysfunction. · Right atrial cavity size is mildly dilated. · Mild aortic valve regurgitation is present. · Mitral valve thickening. Mild mitral valve regurgitation.   · Mild tricuspid valve regurgitation is present. There is no evidence of   pulmonary hypertension. · Mild pulmonic valve regurgitation is present. Signed by: Roscoe Gann MD     10/17/19   CARDIAC PROCEDURE 10/17/2019 10/17/2019    Narrative Moderate LAD stenosis. Critical stenosis of D3 ( small vessel) with mild   to moderate LV dysfunction. Continue intense risk factor modification     Signed by: Roscoe Gann MD     ASSESSMENT and PLAN    ICD-10-CM ICD-9-CM    1. Chronic diastolic heart failure (HCC)  I50.32 428.32    2. Dyslipidemia  E78.5 272.4    3. Coronary artery disease of native artery of native heart with stable angina pectoris ([facility] [address]  I25.118 414.01      413.9    4. LV dysfunction  I51.9 429.9    5. PAD (peripheral artery disease) ([facility])  I73.9 443.9    6. Gastroesophageal reflux disease without esophagitis  K21.9 530.81    7. Benign essential HTN  I10 401.1      No orders of the defined types were placed in this encounter. Follow-up and Dispositions    · Return in about 4 months (around 7/16/2021). Patient with LV dysfunction/ abnormal stress test--underwent repeat cardiac catheterization which revealed mid 50% LAD stenosis. FFR was 0.94. Has mild LV dysfunction which is been stable. Has chronic diastolic CHF NYHA class II symptoms. Currently on optimal medical therapy. Scheduled to follow-up with his PCP. Will need repeat lipid and liver panel. Follow-up in 4 months. My signature below certifies that the above stated patient is homebound and upon completion of the Face-To-Face encounter, has the need for intermittent skilled nursing, physical therapy and/or speech or occupational therapy services in their home for their current diagnosis as outlined in their initial plan of care. These services will continue to be monitored by myself or another physician.

## 2021-03-30 ENCOUNTER — TRANSCRIBE ORDER (OUTPATIENT)
Dept: SCHEDULING | Age: 76
End: 2021-03-30

## 2021-03-30 DIAGNOSIS — F45.8 OTHER SOMATOFORM DISORDERS: Primary | ICD-10-CM

## 2021-04-28 ENCOUNTER — HOSPITAL ENCOUNTER (OUTPATIENT)
Dept: GENERAL RADIOLOGY | Age: 76
Discharge: HOME | End: 2021-04-28
Attending: INTERNAL MEDICINE
Payer: MEDICARE

## 2021-04-28 DIAGNOSIS — F45.8 OTHER SOMATOFORM DISORDERS: ICD-10-CM

## 2021-04-28 PROCEDURE — 74011000250 HC RX REV CODE- 250: Performed by: INTERNAL MEDICINE

## 2021-04-28 PROCEDURE — 74220 X-RAY XM ESOPHAGUS 1CNTRST: CPT

## 2021-04-28 RX ORDER — SIMVASTATIN 20 MG/1
20 TABLET, FILM COATED ORAL
Qty: 90 TAB | Refills: 1 | Status: SHIPPED | OUTPATIENT
Start: 2021-04-28 | End: 2022-01-18 | Stop reason: SDUPTHER

## 2021-04-28 RX ADMIN — ANTACID/ANTIFLATULENT 4 G: 380; 550; 10; 10 GRANULE, EFFERVESCENT ORAL at 08:15

## 2021-04-28 RX ADMIN — BARIUM SULFATE 700 MG: 700 TABLET ORAL at 08:15

## 2021-04-28 RX ADMIN — BARIUM SULFATE 135 ML: 980 POWDER, FOR SUSPENSION ORAL at 08:15

## 2021-04-28 RX ADMIN — BARIUM SULFATE 176 G: 960 POWDER, FOR SUSPENSION ORAL at 08:15

## 2021-06-02 NOTE — ED PROVIDER NOTES
HPI Comments: 9:22 AM Hector Kumar is a 67 y.o. male with a hx of HTN, CHF, CAD and Dyslipidemia and who presents to the ED c/o SOB x 1 month \"or really longer\". He also notes leg edema, mild lower back pain and multiple other complaints but states that his emergent symptoms are his dyspnea. He denies any change in his dyspnea over the past several month. No associated fevers or chills, no chest pain or palpitations, no new pedal edema, no productive cough or wheezing. He states that he has discussed his symptoms with his PMD, cardiologist and pulmonologist (he can't remember the names of his cardiologist or pulmonologist). He states that he was told by all 3 doctors to come to the emergency room. He states he has not been given a diagnosis or explanation of his symptoms. He does not have any follow up appointments scheduled. He denies any acute changes to his symptoms today. He states that he has been taking all medications as prescribed. No other acute symptoms or complaints were noted. The history is provided by the patient. Past Medical History:   Diagnosis Date    Benign essential HTN 2/17/2016    Dyslipidemia 1/24/2017       Past Surgical History:   Procedure Laterality Date    HX CHOLECYSTECTOMY      HX GI      HX HERNIA REPAIR Bilateral          Family History:   Problem Relation Age of Onset    Heart Attack Neg Hx     Stroke Neg Hx        Social History     Social History    Marital status: SINGLE     Spouse name: N/A    Number of children: N/A    Years of education: N/A     Occupational History    Not on file. Social History Main Topics    Smoking status: Never Smoker    Smokeless tobacco: Never Used    Alcohol use No    Drug use: No    Sexual activity: Not on file     Other Topics Concern    Not on file     Social History Narrative         ALLERGIES: Review of patient's allergies indicates no known allergies.     Review of Systems   Constitutional: Negative done   for chills, diaphoresis and fever. HENT: Negative. Negative for congestion, postnasal drip and rhinorrhea. Eyes: Negative for visual disturbance. Respiratory: Positive for shortness of breath. Negative for cough. Cardiovascular: Positive for leg swelling. Negative for chest pain and palpitations. Gastrointestinal: Negative for abdominal pain, nausea and vomiting. Endocrine: Negative. Genitourinary: Negative for difficulty urinating, flank pain and hematuria. Musculoskeletal: Positive for back pain. Negative for arthralgias, gait problem, neck pain and neck stiffness. Skin: Negative for rash. Allergic/Immunologic: Negative. Neurological: Negative for dizziness, syncope, weakness, light-headedness, numbness and headaches. Hematological: Negative. Does not bruise/bleed easily. Psychiatric/Behavioral: Negative. All other systems reviewed and are negative. Vitals:    07/31/17 0908   BP: (!) 151/92   Pulse: 77   Resp: 18   Temp: 97.5 °F (36.4 °C)   SpO2: 100%   Weight: 81.6 kg (180 lb)   Height: 6' 2\" (1.88 m)            Physical Exam   Constitutional: He is oriented to person, place, and time. He appears well-developed and well-nourished. No distress. Resting comfortably on stretcher, speaking comfortably in full sentences. HENT:   Head: Normocephalic and atraumatic. MM moist   Eyes: Conjunctivae and EOM are normal. No scleral icterus. Sclera clear bilaterally   Neck: Normal range of motion. Neck supple. No JVD present. Non-tender to palpation   Cardiovascular: Normal rate, regular rhythm and normal heart sounds. Exam reveals no gallop and no friction rub. No murmur heard. Pulmonary/Chest: Effort normal and breath sounds normal. No respiratory distress. He has no wheezes. He has no rales. He exhibits no tenderness. No crepitance with palpation   Abdominal: Soft. Bowel sounds are normal. He exhibits no distension. There is no tenderness.  There is no rebound and no guarding. Genitourinary:   Genitourinary Comments: No CVA tenderness   Musculoskeletal: He exhibits no tenderness. Normal inspection of upper extremities. Mild trace edema noted to bilateral lower extremities. No calf tenderness with palpation. Lymphadenopathy:     He has no cervical adenopathy. Neurological: He is alert and oriented to person, place, and time. No cranial nerve deficit. He exhibits normal muscle tone. Normal motor and sensation bilaterally to upper and lower extremities   Skin: Skin is warm and dry. He is not diaphoretic. No erythema. Psychiatric:   Normal mood and affect. Vitals reviewed. MDM  Number of Diagnoses or Management Options  Diagnosis management comments: Pt with history of CHF, CAD and chronic dyspnea, presents to ED with complaint of unchanged symptoms of chronic dyspnea. No distress appreciated. VS are reassuring. Will check labs and XR, encouraged pt to schedule follow up appointments with his doctors to learn etiology of his symptoms as this will help him know how to manage his chronic dyspnea. Will reassess but anticipate discharge to home. Reviewed old chart. Pt is followed by Dr. Arash Solorio and is overdue for his office follow up appointment. Reviewed results with pt, will discharge to home with PMD/cardiology/pulmonology follow up. Pt is in agreement with discharge and follow up plans.         Amount and/or Complexity of Data Reviewed  Clinical lab tests: ordered and reviewed  Tests in the radiology section of CPT®: ordered and reviewed  Tests in the medicine section of CPT®: ordered and reviewed  Decide to obtain previous medical records or to obtain history from someone other than the patient: yes  Obtain history from someone other than the patient: yes  Review and summarize past medical records: yes  Independent visualization of images, tracings, or specimens: yes    Risk of Complications, Morbidity, and/or Mortality  Presenting problems: moderate  Management options: moderate    Patient Progress  Patient progress: stable    ED Course       Procedures    EKG:  NSR, rate 73, normal axis, non-specific ST-T abnormalities. No significant changes noted from 6/20/17. SCRIBE ATTESTATION STATEMENT  Documented by: Lynnann Leyden scribing for, and in the presence of, Lilliam Cason MD 07/31/17 9:29 AM     Signed by: Lynnann Leyden, Scribe, 07/31/17 9:29 AM     PROVIDER ATTESTATION STATEMENT  I personally performed the services described in the documentation, reviewed the documentation, as recorded by the scribe in my presence, and it accurately and completely records my words and actions.   Lilliam Cason MD

## 2021-07-13 ENCOUNTER — OFFICE VISIT (OUTPATIENT)
Dept: CARDIOLOGY CLINIC | Age: 76
End: 2021-07-13
Payer: MEDICARE

## 2021-07-13 VITALS
HEART RATE: 69 BPM | SYSTOLIC BLOOD PRESSURE: 124 MMHG | HEIGHT: 74 IN | WEIGHT: 222.4 LBS | BODY MASS INDEX: 28.54 KG/M2 | OXYGEN SATURATION: 99 % | DIASTOLIC BLOOD PRESSURE: 81 MMHG

## 2021-07-13 DIAGNOSIS — I73.9 PAD (PERIPHERAL ARTERY DISEASE) (HCC): ICD-10-CM

## 2021-07-13 DIAGNOSIS — K21.9 GASTROESOPHAGEAL REFLUX DISEASE WITHOUT ESOPHAGITIS: ICD-10-CM

## 2021-07-13 DIAGNOSIS — I51.9 LV DYSFUNCTION: ICD-10-CM

## 2021-07-13 DIAGNOSIS — I25.118 CORONARY ARTERY DISEASE OF NATIVE ARTERY OF NATIVE HEART WITH STABLE ANGINA PECTORIS (HCC): ICD-10-CM

## 2021-07-13 DIAGNOSIS — I50.32 CHRONIC DIASTOLIC HEART FAILURE (HCC): Primary | ICD-10-CM

## 2021-07-13 DIAGNOSIS — E78.5 DYSLIPIDEMIA: ICD-10-CM

## 2021-07-13 PROCEDURE — 99214 OFFICE O/P EST MOD 30 MIN: CPT | Performed by: INTERNAL MEDICINE

## 2021-07-13 PROCEDURE — G8752 SYS BP LESS 140: HCPCS | Performed by: INTERNAL MEDICINE

## 2021-07-13 PROCEDURE — G8428 CUR MEDS NOT DOCUMENT: HCPCS | Performed by: INTERNAL MEDICINE

## 2021-07-13 PROCEDURE — 1101F PT FALLS ASSESS-DOCD LE1/YR: CPT | Performed by: INTERNAL MEDICINE

## 2021-07-13 PROCEDURE — G8432 DEP SCR NOT DOC, RNG: HCPCS | Performed by: INTERNAL MEDICINE

## 2021-07-13 PROCEDURE — G8419 CALC BMI OUT NRM PARAM NOF/U: HCPCS | Performed by: INTERNAL MEDICINE

## 2021-07-13 PROCEDURE — G8536 NO DOC ELDER MAL SCRN: HCPCS | Performed by: INTERNAL MEDICINE

## 2021-07-13 PROCEDURE — G8754 DIAS BP LESS 90: HCPCS | Performed by: INTERNAL MEDICINE

## 2021-07-13 NOTE — PROGRESS NOTES
1. Have you been to the ER, urgent care clinic since your last visit? Hospitalized since your last visit? No    2. Have you seen or consulted any other health care providers outside of the [facility] since your last visit? Include any pap smears or colon screening. No        3. Do you need any refills today?    No

## 2021-07-13 NOTE — PROGRESS NOTES
HISTORY OF PRESENT ILLNESS  Ebony Roche is a 67 y.o. male. CHF  The history is provided by the patient. This is a chronic problem. The current episode started more than 1 week ago. The problem occurs daily. The problem has been gradually improving. Associated symptoms include shortness of breath. Hypertension  Associated symptoms include shortness of breath. Claudication  The history is provided by the patient. This is a chronic problem. The current episode started more than 1 week ago. The problem occurs daily. The problem has been gradually improving. Associated symptoms include shortness of breath. The symptoms are aggravated by exertion and walking. The symptoms are relieved by rest. He has tried rest for the symptoms. Shortness of Breath  The history is provided by the patient. This is a chronic problem. The problem occurs intermittently. The problem has not changed since onset. Associated symptoms include claudication. Pertinent negatives include no PND and no orthopnea. Review of Systems   Constitutional: Negative for chills and weight loss. HENT: Negative for hearing loss. Eyes: Negative for blurred vision. Respiratory: Positive for shortness of breath. Negative for stridor. Cardiovascular: Positive for claudication. Negative for orthopnea and PND. Gastrointestinal: Negative for heartburn. Musculoskeletal: Negative for myalgias. Neurological: Negative for tingling, tremors, focal weakness and loss of consciousness. Psychiatric/Behavioral: Negative for depression and suicidal ideas.      Family History   Problem Relation Age of Onset    Heart Attack Neg Hx     Stroke Neg Hx        Past Medical History:   Diagnosis Date    Abdominal pain     Benign essential HTN 2/17/2016    Dyslipidemia 1/24/2017    Esophageal reflux     Heartburn     High cholesterol     Incontinence     Prostate cancer screening     Swelling of both lower extremities     Urine incontinence Past Surgical History:   Procedure Laterality Date    HX CHOLECYSTECTOMY      HX GI      HX HERNIA REPAIR Bilateral     HX TONSILLECTOMY         Social History     Tobacco Use    Smoking status: Never Smoker    Smokeless tobacco: Never Used   Substance Use Topics    Alcohol use: No       No Known Allergies    Outpatient Medications Marked as Taking for the 7/13/21 encounter (Office Visit) with Ravinder Goodman MD   Medication Sig Dispense Refill    simvastatin (ZOCOR) 20 mg tablet Take 1 Tab by mouth nightly. 90 Tab 1    metoprolol tartrate (LOPRESSOR) 25 mg tablet Take 0.5 Tabs by mouth two (2) times a day. (Patient taking differently: Take 25 mg by mouth two (2) times a day.) 30 Tab 6    lansoprazole (PREVACID) 30 mg capsule Take  by mouth Daily (before breakfast).  meloxicam (MOBIC) 15 mg tablet Take 15 mg by mouth daily.  furosemide (LASIX) 40 mg tablet Take 1 Tab by mouth two (2) times a day. 60 Tab 3        Visit Vitals  /81 (BP 1 Location: Left upper arm, BP Patient Position: Sitting, BP Cuff Size: Large adult)   Pulse 69   Ht 6' 2\" (1.88 m)   Wt 100.9 kg (222 lb 6.4 oz)   SpO2 99%   BMI 28.55 kg/m²     Physical Exam  Constitutional:       General: He is not in acute distress. Appearance: He is well-developed. He is not diaphoretic. HENT:      Head: Atraumatic. Mouth/Throat:      Pharynx: No oropharyngeal exudate. Eyes:      General: No scleral icterus. Conjunctiva/sclera: Conjunctivae normal.   Neck:      Vascular: No JVD. Cardiovascular:      Rate and Rhythm: Normal rate and regular rhythm. Heart sounds: No murmur heard. No gallop. Pulmonary:      Effort: Pulmonary effort is normal.      Breath sounds: Normal breath sounds. No stridor. No wheezing or rales. Abdominal:      Palpations: Abdomen is soft. Tenderness: There is no abdominal tenderness. There is no rebound. Musculoskeletal:         General: Normal range of motion. Cervical back: Neck supple. Skin:     General: Skin is warm. Neurological:      Mental Status: He is alert and oriented to person, place, and time. Motor: No abnormal muscle tone. Psychiatric:         Behavior: Behavior normal.       6/16 Cardiac Cath  FINDINGS:  1. Left main is patent and bifurcates into left anterior descending artery  and circumflex artery. 2. Left anterior descending artery has mild ectasia with moderate to severe  stenosis in the proximal portion. It appears to be a napkin ring type  lesion. By IVUS imaging, we obtained a stenosis of 62% with an area of 3.7  mm2. Mid to distal left anterior descending artery had wall irregularities  with sluggish flow, suggestive of severe microvascular disease. Apical LAD  had wall irregularities. 3. Diagonal 1 and diagonal 2 artery appeared to be small caliber vessel  with wall irregularities. 4. Circumflex artery is codominant with sluggish flow consistent with  microvascular disease. 5. Right coronary artery is codominant with sluggish flow suggestive of  microvascular disease. 6. Left ventricular end-diastolic pressure was 13 mmHg.     CONCLUSION: Single-vessel coronary artery disease.  Left anterior  descending artery stenosis was 62% in the proximal portion by intravascular  ultrasound imaging. Normal left ventricular and diastolic pressure. The  patient is to be on intense medical management and risk factor  Modification. 02/01/19   ECHO ADULT COMPLETE 02/04/2019 2/4/2019    Narrative · Left ventricular mildly decreased systolic function. Estimated left   ventricular ejection fraction is 46 - 50%. Left ventricular global   hypokinesis. Mild (grade 1) left ventricular diastolic dysfunction. · Right atrial cavity size is mildly dilated. · Mild aortic valve regurgitation is present. · Mitral valve thickening. Mild mitral valve regurgitation. · Mild tricuspid valve regurgitation is present.  There is no evidence of   pulmonary hypertension. · Mild pulmonic valve regurgitation is present. Signed by: Melvin Donnelly MD     10/17/19   CARDIAC PROCEDURE 10/17/2019 10/17/2019    Narrative Moderate LAD stenosis. Critical stenosis of D3 ( small vessel) with mild   to moderate LV dysfunction. Continue intense risk factor modification     Signed by: Melvin Donnelly MD     ASSESSMENT and PLAN    ICD-10-CM ICD-9-CM    1. Chronic diastolic heart failure ([facility])  I50.32 428.32    2. Dyslipidemia  E78.5 272.4    3. Coronary artery disease of native artery of native heart with stable angina pectoris ([facility]  I25.118 414.01      413.9    4. LV dysfunction  I51.9 429.9    5. PAD (peripheral artery disease) ([facility])  I73.9 443.9    6. Gastroesophageal reflux disease without esophagitis  K21.9 530.81      No orders of the defined types were placed in this encounter. Follow-up and Dispositions    · Return in about 6 months (around 1/13/2022). Patient with LV dysfunction/ abnormal stress test--underwent repeat cardiac catheterization which revealed mid 50% LAD stenosis. FFR was 0.94. Has mild LV dysfunction which is been stable. Has chronic diastolic CHF NYHA class II symptoms. Currently on optimal medical therapy. Had recent endoscopy and ?  TURP. Advised to continue aspirin, low-dose beta-blocker, statin and Lasix. Follow-up in 6 months to reevaluate symptoms.

## 2021-09-15 ENCOUNTER — OFFICE VISIT (OUTPATIENT)
Dept: CARDIOLOGY CLINIC | Age: 76
End: 2021-09-15
Payer: MEDICARE

## 2021-09-15 VITALS
WEIGHT: 209 LBS | OXYGEN SATURATION: 98 % | BODY MASS INDEX: 26.82 KG/M2 | DIASTOLIC BLOOD PRESSURE: 68 MMHG | HEIGHT: 74 IN | HEART RATE: 57 BPM | SYSTOLIC BLOOD PRESSURE: 112 MMHG

## 2021-09-15 DIAGNOSIS — I10 BENIGN ESSENTIAL HTN: ICD-10-CM

## 2021-09-15 DIAGNOSIS — I25.118 CORONARY ARTERY DISEASE OF NATIVE ARTERY OF NATIVE HEART WITH STABLE ANGINA PECTORIS (HCC): ICD-10-CM

## 2021-09-15 DIAGNOSIS — I73.9 PAD (PERIPHERAL ARTERY DISEASE) (HCC): ICD-10-CM

## 2021-09-15 DIAGNOSIS — I50.32 CHRONIC DIASTOLIC HEART FAILURE (HCC): Primary | ICD-10-CM

## 2021-09-15 DIAGNOSIS — I51.9 LV DYSFUNCTION: ICD-10-CM

## 2021-09-15 DIAGNOSIS — E78.5 DYSLIPIDEMIA: ICD-10-CM

## 2021-09-15 PROCEDURE — 99214 OFFICE O/P EST MOD 30 MIN: CPT | Performed by: NURSE PRACTITIONER

## 2021-09-15 RX ORDER — FUROSEMIDE 40 MG/1
40 TABLET ORAL DAILY
Qty: 60 TABLET | Refills: 3 | Status: SHIPPED | OUTPATIENT
Start: 2021-09-15

## 2021-09-15 NOTE — PATIENT INSTRUCTIONS
Heart-Healthy Diet: Care Instructions  Your Care Instructions     A heart-healthy diet has lots of vegetables, fruits, nuts, beans, and whole grains, and is low in salt. It limits foods that are high in saturated fat, such as meats, cheeses, and fried foods. It may be hard to change your diet, but even small changes can lower your risk of heart attack and heart disease. Follow-up care is a key part of your treatment and safety. Be sure to make and go to all appointments, and call your doctor if you are having problems. It's also a good idea to know your test results and keep a list of the medicines you take. How can you care for yourself at home? Watch your portions  · Use food labels to learn what the recommended servings are for the foods you eat. · Eat only the number of calories you need to stay at a healthy weight. If you need to lose weight, eat fewer calories than your body burns (through exercise and other physical activity). Eat more fruits and vegetables  · Eat a variety of fruit and vegetables every day. Dark green, deep orange, red, or yellow fruits and vegetables are especially good for you. Examples include spinach, carrots, peaches, and berries. · Keep carrots, celery, and other veggies handy for snacks. Buy fruit that is in season and store it where you can see it so that you will be tempted to eat it. · Cook dishes that have a lot of veggies in them, such as stir-fries and soups. Limit saturated fat  · Read food labels, and try to avoid saturated fats. They increase your risk of heart disease. · Use olive or canola oil when you cook. · Bake, broil, grill, or steam foods instead of frying them. · Choose lean meats instead of high-fat meats such as hot dogs and sausages. Cut off all visible fat when you prepare meat. · Eat fish, skinless poultry, and meat alternatives such as soy products instead of high-fat meats.  Soy products, such as tofu, may be especially good for your heart.  · Choose low-fat or fat-free milk and dairy products. Eat foods high in fiber  · Eat a variety of grain products every day. Include whole-grain foods that have lots of fiber and nutrients. Examples of whole-grain foods include oats, whole wheat bread, and brown rice. · Buy whole-grain breads and cereals, instead of white bread or pastries. Limit salt and sodium  · Limit how much salt and sodium you eat to help lower your blood pressure. · Taste food before you salt it. Add only a little salt when you think you need it. With time, your taste buds will adjust to less salt. · Eat fewer snack items, fast foods, and other high-salt, processed foods. Check food labels for the amount of sodium in packaged foods. · Choose low-sodium versions of canned goods (such as soups, vegetables, and beans). Limit sugar  · Limit drinks and foods with added sugar. These include candy, desserts, and soda pop. Limit alcohol  · Limit alcohol to no more than 2 drinks a day for men and 1 drink a day for women. Too much alcohol can cause health problems. When should you call for help? Watch closely for changes in your health, and be sure to contact your doctor if:    · You would like help planning heart-healthy meals. Where can you learn more? Go to http://www.crowe.com/  Enter V137 in the search box to learn more about \"Heart-Healthy Diet: Care Instructions. \"  Current as of: December 17, 2020               Content Version: 12.8  © 2006-2021 [employer]. Care instructions adapted under license by [employer] (which disclaims liability or warranty for this information). If you have questions about a medical condition or this instruction, always ask your healthcare professional. [place] [address] any warranty or liability for your use of this information.

## 2021-09-15 NOTE — PROGRESS NOTES
1. Have you been to the ER, urgent care clinic since your last visit? Hospitalized since your last visit? No    2. Have you seen or consulted any other health care providers outside of the [address] [place] [address] since your last visit? Include any pap smears or colon screening. Yes Where: PCP Routine/ Gastrology Abdominal pain    3. Do you need any refills today?   No

## 2021-09-15 NOTE — PROGRESS NOTES
HISTORY OF PRESENT ILLNESS  Yo West is a 67 y.o. male. 9/2021 Patient presents with c/o abdominal distension associated with hoarseness and gas pain. C/O urinary difficulties with burning sensation. He also c/o BLE edema. Medications were reconciled. He has no longer taking Lasix he believes this was denied by insurance. He denies chest pain shortness of breath or palpitations. CHF  The history is provided by the patient. This is a chronic problem. The current episode started more than 1 week ago. The problem occurs daily. The problem has been gradually improving. Pertinent negatives include no shortness of breath. Hypertension  Pertinent negatives include no shortness of breath. Claudication  The history is provided by the patient. This is a chronic problem. The current episode started more than 1 week ago. The problem occurs daily. The problem has been gradually improving. Pertinent negatives include no shortness of breath. The symptoms are aggravated by exertion and walking. The symptoms are relieved by rest. He has tried rest for the symptoms. Shortness of Breath  The history is provided by the patient. This is a chronic problem. The problem occurs intermittently. The problem has not changed since onset. Pertinent negatives include no PND, no orthopnea and no claudication. Follow-up  Pertinent negatives include no shortness of breath. Review of Systems   Constitutional: Negative for chills and weight loss. HENT: Negative for hearing loss. Eyes: Negative for blurred vision. Respiratory: Negative for shortness of breath and stridor. Cardiovascular: Negative for orthopnea, claudication and PND. Gastrointestinal: Positive for heartburn. Genitourinary: Positive for dysuria. Musculoskeletal: Negative for myalgias. Neurological: Negative for dizziness, tingling, tremors, focal weakness and loss of consciousness.    Psychiatric/Behavioral: Negative for depression and suicidal ideas. Family History   Problem Relation Age of Onset    Heart Attack Neg Hx     Stroke Neg Hx        Past Medical History:   Diagnosis Date    Abdominal pain     Benign essential HTN 2/17/2016    Dyslipidemia 1/24/2017    Esophageal reflux     Heartburn     High cholesterol     Incontinence     Prostate cancer screening     Swelling of both lower extremities     Urine incontinence        Past Surgical History:   Procedure Laterality Date    HX CHOLECYSTECTOMY      HX GI      HX HERNIA REPAIR Bilateral     HX TONSILLECTOMY         Social History     Tobacco Use    Smoking status: Never Smoker    Smokeless tobacco: Never Used   Substance Use Topics    Alcohol use: No       No Known Allergies         Visit Vitals  /68 (BP 1 Location: Left upper arm, BP Patient Position: Sitting, BP Cuff Size: Adult)   Pulse (!) 57   Ht 6' 2\" (1.88 m)   Wt 94.8 kg (209 lb)   SpO2 98%   BMI 26.83 kg/m²     Physical Exam  Constitutional:       General: He is not in acute distress. Appearance: He is well-developed. He is not diaphoretic. HENT:      Head: Atraumatic. Mouth/Throat:      Pharynx: No oropharyngeal exudate. Eyes:      General: No scleral icterus. Conjunctiva/sclera: Conjunctivae normal.   Neck:      Vascular: No JVD. Cardiovascular:      Rate and Rhythm: Normal rate and regular rhythm. Heart sounds: No murmur heard. No gallop. Pulmonary:      Effort: Pulmonary effort is normal.      Breath sounds: Normal breath sounds. No stridor. No wheezing or rales. Abdominal:      Palpations: Abdomen is soft. Tenderness: There is no abdominal tenderness. There is no rebound. Musculoskeletal:         General: Normal range of motion. Cervical back: Neck supple. Skin:     General: Skin is warm. Neurological:      Mental Status: He is alert and oriented to person, place, and time. Motor: No abnormal muscle tone.    Psychiatric:         Behavior: Behavior normal.       6/16 Cardiac Cath  FINDINGS:  1. Left main is patent and bifurcates into left anterior descending artery  and circumflex artery. 2. Left anterior descending artery has mild ectasia with moderate to severe  stenosis in the proximal portion. It appears to be a napkin ring type  lesion. By IVUS imaging, we obtained a stenosis of 62% with an area of 3.7  mm2. Mid to distal left anterior descending artery had wall irregularities  with sluggish flow, suggestive of severe microvascular disease. Apical LAD  had wall irregularities. 3. Diagonal 1 and diagonal 2 artery appeared to be small caliber vessel  with wall irregularities. 4. Circumflex artery is codominant with sluggish flow consistent with  microvascular disease. 5. Right coronary artery is codominant with sluggish flow suggestive of  microvascular disease. 6. Left ventricular end-diastolic pressure was 13 mmHg.     CONCLUSION: Single-vessel coronary artery disease.  Left anterior  descending artery stenosis was 62% in the proximal portion by intravascular  ultrasound imaging. Normal left ventricular and diastolic pressure. The  patient is to be on intense medical management and risk factor  Modification. 02/01/19   ECHO ADULT COMPLETE 02/04/2019 2/4/2019    Narrative · Left ventricular mildly decreased systolic function. Estimated left   ventricular ejection fraction is 46 - 50%. Left ventricular global   hypokinesis. Mild (grade 1) left ventricular diastolic dysfunction. · Right atrial cavity size is mildly dilated. · Mild aortic valve regurgitation is present. · Mitral valve thickening. Mild mitral valve regurgitation. · Mild tricuspid valve regurgitation is present. There is no evidence of   pulmonary hypertension. · Mild pulmonic valve regurgitation is present. Signed by: Luz Marina Webster MD     10/17/19   CARDIAC PROCEDURE 10/17/2019 10/17/2019    Narrative Moderate LAD stenosis.  Critical stenosis of D3 ( small vessel) with mild   to moderate LV dysfunction. Continue intense risk factor modification     Signed by: Santi Wang MD     ASSESSMENT and PLAN    ICD-10-CM ICD-9-CM    1. Chronic diastolic heart failure ([facility])  I50.32 428.32 furosemide (LASIX) 40 mg tablet      METABOLIC PANEL, BASIC   2. Coronary artery disease of native artery of native heart with stable angina pectoris ([facility]  I25.118 414.01      413.9    3. Dyslipidemia  E78.5 272.4    4. PAD (peripheral artery disease) ([facility])  I73.9 443.9    5. Benign essential HTN  I10 401.1    6. LV dysfunction  I51.9 429.9      Orders Placed This Encounter    METABOLIC PANEL, BASIC     Standing Status:   Future     Standing Expiration Date:   9/16/2022    furosemide (LASIX) 40 mg tablet     Sig: Take 1 Tablet by mouth daily. Dispense:  60 Tablet     Refill:  3     Follow-up and Dispositions    · Return for Obtain labs with lipids from PCP f/u as previously scheduled with Dr. Lizandro Gray. Patient with LV dysfunction/ abnormal stress test--underwent repeat cardiac catheterization which revealed mid 50% LAD stenosis. FFR was 0.94. Has mild LV dysfunction which is been stable. Has chronic diastolic CHF NYHA class II symptoms. Currently on optimal medical therapy. Had recent endoscopy and ?  TURP. Advised to continue aspirin, low-dose beta-blocker, statin and Lasix. Follow-up in 6 months to reevaluate symptoms. 6/2766  chronic diastolic CHF NYHA class II symptoms. No significant lower extremity edema noted in office today. Will resume Lasix 40 mg/day check BMP in 1 week. Patient is advised to follow-up with GI and  due to ongoing symptoms. Pressure is controlled continue metoprolol twice daily.

## 2022-01-18 ENCOUNTER — OFFICE VISIT (OUTPATIENT)
Dept: CARDIOLOGY CLINIC | Age: 77
End: 2022-01-18
Payer: MEDICARE

## 2022-01-18 ENCOUNTER — HOSPITAL ENCOUNTER (OUTPATIENT)
Dept: LAB | Age: 77
Discharge: HOME | End: 2022-01-18
Payer: MEDICARE

## 2022-01-18 VITALS
WEIGHT: 221 LBS | DIASTOLIC BLOOD PRESSURE: 70 MMHG | BODY MASS INDEX: 28.36 KG/M2 | HEART RATE: 90 BPM | SYSTOLIC BLOOD PRESSURE: 110 MMHG | HEIGHT: 74 IN | OXYGEN SATURATION: 98 %

## 2022-01-18 DIAGNOSIS — I25.118 CORONARY ARTERY DISEASE OF NATIVE ARTERY OF NATIVE HEART WITH STABLE ANGINA PECTORIS (HCC): ICD-10-CM

## 2022-01-18 DIAGNOSIS — E78.5 DYSLIPIDEMIA: ICD-10-CM

## 2022-01-18 DIAGNOSIS — I51.9 LV DYSFUNCTION: ICD-10-CM

## 2022-01-18 DIAGNOSIS — I73.9 PAD (PERIPHERAL ARTERY DISEASE) (HCC): ICD-10-CM

## 2022-01-18 DIAGNOSIS — K21.9 GASTROESOPHAGEAL REFLUX DISEASE WITHOUT ESOPHAGITIS: ICD-10-CM

## 2022-01-18 DIAGNOSIS — I10 BENIGN ESSENTIAL HTN: ICD-10-CM

## 2022-01-18 DIAGNOSIS — I50.32 CHRONIC DIASTOLIC HEART FAILURE (HCC): Primary | ICD-10-CM

## 2022-01-18 DIAGNOSIS — I50.32 CHRONIC DIASTOLIC HEART FAILURE (HCC): ICD-10-CM

## 2022-01-18 LAB
ANION GAP SERPL CALC-SCNC: 2 MMOL/L (ref 3–18)
BUN SERPL-MCNC: 14 MG/DL (ref 7–18)
BUN/CREAT SERPL: 11 (ref 12–20)
CALCIUM SERPL-MCNC: 9.4 MG/DL (ref 8.5–10.1)
CHLORIDE SERPL-SCNC: 108 MMOL/L (ref 100–111)
CO2 SERPL-SCNC: 32 MMOL/L (ref 21–32)
CREAT SERPL-MCNC: 1.23 MG/DL (ref 0.6–1.3)
GLUCOSE SERPL-MCNC: 88 MG/DL (ref 74–99)
MAGNESIUM SERPL-MCNC: 2 MG/DL (ref 1.6–2.6)
POTASSIUM SERPL-SCNC: 4 MMOL/L (ref 3.5–5.5)
SODIUM SERPL-SCNC: 142 MMOL/L (ref 136–145)

## 2022-01-18 PROCEDURE — 80048 BASIC METABOLIC PNL TOTAL CA: CPT

## 2022-01-18 PROCEDURE — G8536 NO DOC ELDER MAL SCRN: HCPCS | Performed by: INTERNAL MEDICINE

## 2022-01-18 PROCEDURE — G8419 CALC BMI OUT NRM PARAM NOF/U: HCPCS | Performed by: INTERNAL MEDICINE

## 2022-01-18 PROCEDURE — 99214 OFFICE O/P EST MOD 30 MIN: CPT | Performed by: INTERNAL MEDICINE

## 2022-01-18 PROCEDURE — G8754 DIAS BP LESS 90: HCPCS | Performed by: INTERNAL MEDICINE

## 2022-01-18 PROCEDURE — G8752 SYS BP LESS 140: HCPCS | Performed by: INTERNAL MEDICINE

## 2022-01-18 PROCEDURE — G8510 SCR DEP NEG, NO PLAN REQD: HCPCS | Performed by: INTERNAL MEDICINE

## 2022-01-18 PROCEDURE — G8427 DOCREV CUR MEDS BY ELIG CLIN: HCPCS | Performed by: INTERNAL MEDICINE

## 2022-01-18 PROCEDURE — 93000 ELECTROCARDIOGRAM COMPLETE: CPT | Performed by: INTERNAL MEDICINE

## 2022-01-18 PROCEDURE — 36415 COLL VENOUS BLD VENIPUNCTURE: CPT

## 2022-01-18 PROCEDURE — 1101F PT FALLS ASSESS-DOCD LE1/YR: CPT | Performed by: INTERNAL MEDICINE

## 2022-01-18 PROCEDURE — 83735 ASSAY OF MAGNESIUM: CPT

## 2022-01-18 RX ORDER — SIMVASTATIN 20 MG/1
20 TABLET, FILM COATED ORAL
Qty: 90 TABLET | Refills: 3 | Status: SHIPPED | OUTPATIENT
Start: 2022-01-18 | End: 2022-01-18 | Stop reason: SDUPTHER

## 2022-01-18 RX ORDER — SIMVASTATIN 20 MG/1
20 TABLET, FILM COATED ORAL
Qty: 90 TABLET | Refills: 3 | Status: SHIPPED | OUTPATIENT
Start: 2022-01-18

## 2022-01-18 NOTE — PROGRESS NOTES
1. Have you been to the ER, urgent care clinic since your last visit? Hospitalized since your last visit?     no    2. Have you seen or consulted any other health care providers outside of the [address] [place] [address] since your last visit? Include any pap smears or colon screening.       no

## 2022-01-18 NOTE — PROGRESS NOTES
HISTORY OF PRESENT ILLNESS  Wyatt Odell is a 67 y.o. male. 9/2021 Patient presents with c/o abdominal distension associated with hoarseness and gas pain. C/O urinary difficulties with burning sensation. He also c/o BLE edema. Medications were reconciled. He has no longer taking Lasix he believes this was denied by insurance. He denies chest pain shortness of breath or palpitations. Claudication  The history is provided by the patient. This is a chronic problem. The current episode started more than 1 week ago. The problem occurs daily. The problem has been gradually improving. The symptoms are aggravated by exertion and walking. The symptoms are relieved by rest. He has tried rest for the symptoms. Shortness of Breath  The history is provided by the patient. This is a chronic problem. The problem occurs intermittently. The problem has not changed since onset. Pertinent negatives include no PND, no orthopnea and no claudication. Review of Systems   Constitutional: Negative for chills and weight loss. HENT: Negative for hearing loss. Eyes: Negative for blurred vision. Respiratory: Negative for stridor. Cardiovascular: Negative for orthopnea, claudication and PND. Gastrointestinal: Positive for heartburn. Genitourinary: Positive for dysuria. Musculoskeletal: Negative for myalgias. Neurological: Negative for dizziness, tingling, tremors, focal weakness and loss of consciousness. Psychiatric/Behavioral: Negative for depression and suicidal ideas.      Family History   Problem Relation Age of Onset    Heart Attack Neg Hx     Stroke Neg Hx        Past Medical History:   Diagnosis Date    Abdominal pain     Benign essential HTN 2/17/2016    Dyslipidemia 1/24/2017    Esophageal reflux     Heartburn     High cholesterol     Incontinence     Prostate cancer screening     Swelling of both lower extremities     Urine incontinence        Past Surgical History:   Procedure Laterality Date    HX CHOLECYSTECTOMY      HX GI      HX HERNIA REPAIR Bilateral     HX TONSILLECTOMY         Social History     Tobacco Use    Smoking status: Never Smoker    Smokeless tobacco: Never Used   Substance Use Topics    Alcohol use: No       No Known Allergies         Visit Vitals  /70 (BP 1 Location: Left upper arm, BP Patient Position: Sitting)   Pulse 90   Ht 6' 2\" (1.88 m)   Wt 100.2 kg (221 lb)   SpO2 98%   BMI 28.37 kg/m²     Physical Exam  Constitutional:       General: He is not in acute distress. Appearance: He is well-developed. He is not diaphoretic. HENT:      Head: Atraumatic. Mouth/Throat:      Pharynx: No oropharyngeal exudate. Eyes:      General: No scleral icterus. Conjunctiva/sclera: Conjunctivae normal.   Neck:      Vascular: No JVD. Cardiovascular:      Rate and Rhythm: Normal rate and regular rhythm. Heart sounds: No murmur heard. No gallop. Pulmonary:      Effort: Pulmonary effort is normal.      Breath sounds: Normal breath sounds. No stridor. No wheezing or rales. Abdominal:      Palpations: Abdomen is soft. Tenderness: There is no abdominal tenderness. There is no rebound. Musculoskeletal:         General: Normal range of motion. Cervical back: Neck supple. Skin:     General: Skin is warm. Neurological:      Mental Status: He is alert and oriented to person, place, and time. Motor: No abnormal muscle tone. Psychiatric:         Behavior: Behavior normal.       6/16 Cardiac Cath  FINDINGS:  1. Left main is patent and bifurcates into left anterior descending artery  and circumflex artery. 2. Left anterior descending artery has mild ectasia with moderate to severe  stenosis in the proximal portion. It appears to be a napkin ring type  lesion. By IVUS imaging, we obtained a stenosis of 62% with an area of 3.7  mm2.  Mid to distal left anterior descending artery had wall irregularities  with sluggish flow, suggestive of severe microvascular disease. Apical LAD  had wall irregularities. 3. Diagonal 1 and diagonal 2 artery appeared to be small caliber vessel  with wall irregularities. 4. Circumflex artery is codominant with sluggish flow consistent with  microvascular disease. 5. Right coronary artery is codominant with sluggish flow suggestive of  microvascular disease. 6. Left ventricular end-diastolic pressure was 13 mmHg.     CONCLUSION: Single-vessel coronary artery disease.  Left anterior  descending artery stenosis was 62% in the proximal portion by intravascular  ultrasound imaging. Normal left ventricular and diastolic pressure. The  patient is to be on intense medical management and risk factor  Modification. 02/01/19   ECHO ADULT COMPLETE 02/04/2019 2/4/2019    Narrative · Left ventricular mildly decreased systolic function. Estimated left   ventricular ejection fraction is 46 - 50%. Left ventricular global   hypokinesis. Mild (grade 1) left ventricular diastolic dysfunction. · Right atrial cavity size is mildly dilated. · Mild aortic valve regurgitation is present. · Mitral valve thickening. Mild mitral valve regurgitation. · Mild tricuspid valve regurgitation is present. There is no evidence of   pulmonary hypertension. · Mild pulmonic valve regurgitation is present. Signed by: Leonid Carpenter MD     10/17/19   CARDIAC PROCEDURE 10/17/2019 10/17/2019    Narrative Moderate LAD stenosis. Critical stenosis of D3 ( small vessel) with mild   to moderate LV dysfunction. Continue intense risk factor modification     Signed by: Leonid Carpenter MD     ASSESSMENT and PLAN    ICD-10-CM ICD-9-CM    1. Chronic diastolic heart failure (HCC)  I50.32 428.32 MAGNESIUM      METABOLIC PANEL, BASIC   2. Benign essential HTN  I10 401.1 AMB POC EKG ROUTINE W/ 12 LEADS, INTER & REP   3.  Coronary artery disease of native artery of native heart with stable angina pectoris ([address]  I25.118 414.01      413.9    4. Dyslipidemia  E78.5 272.4    5. PAD (peripheral artery disease) ([facility])  I73.9 443.9    6. LV dysfunction  I51.9 429.9    7. Gastroesophageal reflux disease without esophagitis  K21.9 530.81      Orders Placed This Encounter    MAGNESIUM     Standing Status:   Future     Standing Expiration Date:   8/46/2122    METABOLIC PANEL, BASIC     Standing Status:   Future     Standing Expiration Date:   1/19/2023    AMB POC EKG ROUTINE W/ 12 LEADS, INTER & REP     Order Specific Question:   Reason for Exam:     Answer:   routine    DISCONTD: simvastatin (ZOCOR) 20 mg tablet     Sig: Take 1 Tablet by mouth nightly. Dispense:  90 Tablet     Refill:  3    simvastatin (ZOCOR) 20 mg tablet     Sig: Take 1 Tablet by mouth nightly. Dispense:  90 Tablet     Refill:  3     Follow-up and Dispositions    · Return in about 6 months (around 7/18/2022). Patient with LV dysfunction/ abnormal stress test--underwent repeat cardiac catheterization which revealed mid 50% LAD stenosis. FFR was 0.94. Has mild LV dysfunction which is been stable. Has chronic diastolic CHF NYHA class II symptoms. Currently on optimal medical therapy. Stable symptoms. Will obtain bmp/ mg in 1 week  Continue current meds and f/u in 6 months.

## 2022-03-18 PROBLEM — E78.5 DYSLIPIDEMIA: Status: ACTIVE | Noted: 2017-01-24

## 2022-03-19 PROBLEM — I51.9 LV DYSFUNCTION: Status: ACTIVE | Noted: 2017-03-21

## 2022-03-20 PROBLEM — I25.119 ANGINA CONCURRENT WITH AND DUE TO ARTERIOSCLEROSIS OF CORONARY ARTERY (HCC): Status: ACTIVE | Noted: 2019-10-01

## 2022-11-14 NOTE — ED TRIAGE NOTES
Per Appt. Tremayne Arias has a note loaded no pre auth for CPT [id number]. Pt arrived c/o hot and cold chills, abdominal pain, states he is urinating more frequently, also c/o leg swelling bilateral from the knees down, states his throat just feels swollen - no swelling or redness noted on visual exam, states he has been sick since his last visit to the ED which was some time ago

## 2023-02-09 ENCOUNTER — APPOINTMENT (OUTPATIENT)
Dept: CT IMAGING | Age: 78
End: 2023-02-09
Attending: PHYSICIAN ASSISTANT
Payer: MEDICARE

## 2023-02-09 ENCOUNTER — HOSPITAL ENCOUNTER (EMERGENCY)
Age: 78
Discharge: HOME | End: 2023-02-09
Attending: EMERGENCY MEDICINE
Payer: MEDICARE

## 2023-02-09 VITALS
SYSTOLIC BLOOD PRESSURE: 150 MMHG | OXYGEN SATURATION: 99 % | HEART RATE: 69 BPM | TEMPERATURE: 97.6 F | DIASTOLIC BLOOD PRESSURE: 105 MMHG | RESPIRATION RATE: 20 BRPM
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
## 2023-02-09 PROCEDURE — 70450 CT HEAD/BRAIN W/O DYE: CPT

## 2023-02-09 PROCEDURE — 72125 CT NECK SPINE W/O DYE: CPT

## 2023-02-09 PROCEDURE — 99284 EMERGENCY DEPT VISIT MOD MDM: CPT

## 2023-02-09 PROCEDURE — 93005 ELECTROCARDIOGRAM TRACING: CPT

## 2023-02-09 NOTE — ED TRIAGE NOTES
Client reports episode of syncopy hitting l. Side of head. Client reports having gash over l. Eye brown. Deneis any coagualtion therapy. Ambulatory into er,  strong gait.

## 2023-02-09 NOTE — ED PROVIDER NOTES
EMERGENCY DEPARTMENT HISTORY AND PHYSICAL EXAM    Date: 2/9/2023  Patient Name: Angela Hammre    History of Presenting Illness         History Provided By: Patient      Additional History (Context): Angela Hammer is a 65 y.o. male with a history of hypertension who presents today for a laceration and fall. Patient reports he was sitting on the sidewalk when he fell forward. Denies known cause for this. Is unsure if he had a true syncope event or not. Patient reports he was advised come the emergency department for stitches. States tetanus is up-to-date. Is not on any blood thinners. PCP: Romaine Whitfield MD    Current Outpatient Medications   Medication Sig Dispense Refill    simvastatin (ZOCOR) 20 mg tablet Take 1 Tablet by mouth nightly. 90 Tablet 3    furosemide (LASIX) 40 mg tablet Take 1 Tablet by mouth daily. 60 Tablet 3    metoprolol tartrate (LOPRESSOR) 25 mg tablet Take 0.5 Tabs by mouth two (2) times a day. (Patient taking differently: Take 25 mg by mouth two (2) times a day.) 30 Tab 6    lansoprazole (PREVACID) 30 mg capsule Take  by mouth Daily (before breakfast). meloxicam (MOBIC) 15 mg tablet Take 15 mg by mouth daily. aspirin delayed-release 81 mg tablet Take  by mouth daily.          Past History     Past Medical History:  Past Medical History:   Diagnosis Date    Abdominal pain     Benign essential HTN 2/17/2016    Dyslipidemia 1/24/2017    Esophageal reflux     Heartburn     High cholesterol     Incontinence     Prostate cancer screening     Swelling of both lower extremities     Urine incontinence        Past Surgical History:  Past Surgical History:   Procedure Laterality Date    HX CHOLECYSTECTOMY      HX GI      HX HERNIA REPAIR Bilateral     HX TONSILLECTOMY         Family History:  Family History   Problem Relation Age of Onset    Heart Attack Neg Hx     Stroke Neg Hx        Social History:  Social History     Tobacco Use    Smoking status: Never    Smokeless tobacco: Never   Substance Use Topics    Alcohol use: No    Drug use: No       Allergies:  No Known Allergies      Review of Systems   Review of Systems   Constitutional:  Negative for chills and fever. HENT:  Negative for congestion, rhinorrhea and sore throat. Respiratory:  Negative for cough and shortness of breath. Cardiovascular:  Negative for chest pain. Gastrointestinal:  Negative for abdominal pain, blood in stool, constipation, diarrhea, nausea and vomiting. Genitourinary:  Negative for dysuria, frequency and hematuria. Musculoskeletal:  Negative for back pain and myalgias. Skin:  Positive for wound. Negative for rash. Neurological:  Positive for syncope. Negative for dizziness and headaches. All other systems reviewed and are negative. All Other Systems Negative  Physical Exam     Vitals:    02/09/23 0933   BP: (!) 150/105   Pulse: 69   Resp: 20   Temp: 97.6 °F (36.4 °C)   SpO2: 99%     Physical Exam  Vitals and nursing note reviewed. Constitutional:       General: He is not in acute distress. Appearance: He is well-developed. He is not diaphoretic. HENT:      Head: Normocephalic and atraumatic. Eyes:      Conjunctiva/sclera: Conjunctivae normal.   Cardiovascular:      Rate and Rhythm: Normal rate and regular rhythm. Heart sounds: Normal heart sounds. Pulmonary:      Effort: Pulmonary effort is normal. No respiratory distress. Breath sounds: Normal breath sounds. Chest:      Chest wall: No tenderness. Abdominal:      General: Bowel sounds are normal. There is no distension. Palpations: Abdomen is soft. Tenderness: There is no abdominal tenderness. There is no guarding or rebound. Musculoskeletal:         General: No deformity. Normal range of motion. Cervical back: Normal range of motion and neck supple. Skin:     General: Skin is warm and dry. Findings: Abrasion present.       Comments: Abrasion noted to the left side of his forehead, no bleeding or concern for infection. No foreign body   Neurological:      Mental Status: He is alert and oriented to person, place, and time. Diagnostic Study Results     Labs -     Recent Results (from the past 12 hour(s))   EKG, 12 LEAD, INITIAL    Collection Time: 02/09/23 10:17 AM   Result Value Ref Range    Ventricular Rate 69 BPM    Atrial Rate 69 BPM    P-R Interval 98 ms    QRS Duration 102 ms    Q-T Interval 456 ms    QTC Calculation (Bezet) 488 ms    Calculated R Axis 17 degrees    Calculated T Axis -127 degrees    Diagnosis       Sinus rhythm with short UT with premature atrial complexes  Nonspecific ST and T wave abnormality  Prolonged QT  Abnormal ECG  When compared with ECG of 21-OCT-2020 12:42,  Questionable change in QRS duration         Radiologic Studies -   CT HEAD WO CONT   Final Result   No acute intracranial abnormality. CT SPINE CERV WO CONT   Final Result      1. No acute abnormality of the cervical spine. CT Results  (Last 48 hours)                 02/09/23 1105  CT HEAD WO CONT Final result    Impression:  No acute intracranial abnormality. Narrative:  CT of the head without contrast       HISTORY: Fall. Syncope, hit left side of head. Laceration to left eyebrow. COMPARISON: None       TECHNIQUE: Axial images of the brain were obtained, without the administration   of intravenous contrast. Coronal and sagittal reconstructions were generated. All CT scans at this facility are performed using dose optimization technique as   appropriate to a performed exam, to include automated exposure control,   adjustment of the MA and/or kV according to patient size (including appropriate   matching for site-specific examinations) or use of  iterative reconstruction   technique. FINDINGS:        Paranasal sinuses and mastoid air cells: Paranasal sinuses are clear. Mastoid   air cells are clear. Calvarium: No acute fracture. Brain: Scattered low attenuation involving periventricular and subcortical white   matter, a nonspecific finding which is most commonly encountered in the setting   of chronic microvascular disease. There is generalized prominence of the   ventricular system, associated with proportional widening of the cortical   cerebral sulci, compatible with mild generalized volume loss. No acute   intracranial hemorrhage. No mass effect or midline shift. Gray-white matter   differentiations are maintained. 02/09/23 1105  CT SPINE CERV WO CONT Final result    Impression:      1. No acute abnormality of the cervical spine. Narrative:  CT SCAN CERVICAL SPINE WITHOUT CONTRAST       HISTORY: Fall, syncope, hit left side of head. COMPARISON: None       TECHNIQUE: Axial images through the cervical spine were obtained without   intravenous contrast. Coronal and sagittal reformatted images were also obtained   for better evaluation of regional anatomy and alignment. All CT scans are   performed using dose optimization techniques as appropriate to the performed   exam including the following: Automated exposure control, adjustment of mA   and/or kV according to patient size, and use of iterative reconstructive   technique. FINDINGS:       Bones: No acute fracture or listhesis. Multilevel degenerative disc disease. Mild canal stenosis C6-C7 secondary to posterior disc osteophyte complex. No   critical canal stenosis. Multilevel neuroforaminal stenosis. Soft tissues: No prevertebral soft tissue swelling. Atherosclerosis. Lung apices: Clear. CXR Results  (Last 48 hours)      None              Medical Decision Making   I am the first provider for this patient. I reviewed the vital signs, available nursing notes, past medical history, past surgical history, family history and social history. Vital Signs-Reviewed the patient's vital signs.       Records Reviewed: Nursing Notes and Old Medical Records     Procedures: None   Procedures    Provider Notes (Medical Decision Making):     Differential: Fracture, dislocation, intracranial bleed, abrasion, contusion, syncope, arrhythmia    Plan: Patient is refusing any sort of labs at this time. Is agreeable to CT head and neck and EKG. Will clean and dress patient's wound. 12:32 PM  Imaging is overall reassuring. Have discussed this with the patient. Have discussed the importance of proper at home wound care with the patient. Return precautions have been given. Will discharge home. MED RECONCILIATION:  No current facility-administered medications for this encounter. Current Outpatient Medications   Medication Sig    simvastatin (ZOCOR) 20 mg tablet Take 1 Tablet by mouth nightly. furosemide (LASIX) 40 mg tablet Take 1 Tablet by mouth daily. metoprolol tartrate (LOPRESSOR) 25 mg tablet Take 0.5 Tabs by mouth two (2) times a day. (Patient taking differently: Take 25 mg by mouth two (2) times a day.)    lansoprazole (PREVACID) 30 mg capsule Take  by mouth Daily (before breakfast). meloxicam (MOBIC) 15 mg tablet Take 15 mg by mouth daily. aspirin delayed-release 81 mg tablet Take  by mouth daily. Disposition:  Home     DISCHARGE NOTE:   Pt has been reexamined. Patient has no new complaints, changes, or physical findings. Care plan outlined and precautions discussed. Results of workup were reviewed with the patient. All medications were reviewed with the patient. All of pt's questions and concerns were addressed. Patient was instructed and agrees to follow up with PCP as well as to return to the ED upon further deterioration. Patient is ready to go home.     Follow-up Information       Follow up With Specialties Details Why [address] [facility] EMERGENCY DEPT Emergency Medicine  As needed [address] [facility] [address] [facility]    Chris Friend MD Family Medicine Schedule an appointment as soon as possible for a visit   [address] [place] [address] Tomas PINEDA [facility] [id number]  [phone]              Discharge Medication List as of 2/9/2023 12:22 PM              Diagnosis     Clinical Impression:   1. Fall, initial encounter          \"Please note that this dictation was completed with [employer], the computer voice recognition software. Quite often unanticipated grammatical, syntax, homophones, and other interpretive errors are inadvertently transcribed by the computer software. Please disregard these errors. Please excuse any errors that have escaped final proofreading. \"

## 2023-02-10 LAB
ATRIAL RATE: 69 BPM
CALCULATED R AXIS, ECG10: 17 DEGREES
CALCULATED T AXIS, ECG11: -127 DEGREES
DIAGNOSIS, 93000: NORMAL
P-R INTERVAL, ECG05: 98 MS
Q-T INTERVAL, ECG07: 456 MS
QRS DURATION, ECG06: 102 MS
QTC CALCULATION (BEZET), ECG08: 488 MS
VENTRICULAR RATE, ECG03: 69 BPM

## 2023-06-27 ENCOUNTER — APPOINTMENT (OUTPATIENT)
Facility: HOSPITAL | Age: 78
End: 2023-06-27
Payer: MEDICARE

## 2023-06-27 ENCOUNTER — HOSPITAL ENCOUNTER (EMERGENCY)
Facility: HOSPITAL | Age: 78
Discharge: HOME | End: 2023-06-27
Attending: EMERGENCY MEDICINE
Payer: MEDICARE

## 2023-06-27 VITALS
WEIGHT: 221 LBS | BODY MASS INDEX: 28.36 KG/M2 | DIASTOLIC BLOOD PRESSURE: 92 MMHG | SYSTOLIC BLOOD PRESSURE: 147 MMHG | TEMPERATURE: 97.4 F | HEIGHT: 74 IN | RESPIRATION RATE: 18 BRPM | HEART RATE: 73 BPM | OXYGEN SATURATION: 99 %

## 2023-06-27 DIAGNOSIS — I50.9 CONGESTIVE HEART FAILURE, UNSPECIFIED HF CHRONICITY, UNSPECIFIED HEART FAILURE TYPE (HCC): ICD-10-CM

## 2023-06-27 DIAGNOSIS — R06.02 SHORTNESS OF BREATH: Primary | ICD-10-CM

## 2023-06-27 LAB
ANION GAP SERPL CALC-SCNC: 4 MMOL/L (ref 3–18)
BASOPHILS # BLD: 0 K/UL (ref 0–0.1)
BASOPHILS NFR BLD: 1 % (ref 0–2)
BUN SERPL-MCNC: 17 MG/DL (ref 7–18)
BUN/CREAT SERPL: 13 (ref 12–20)
CALCIUM SERPL-MCNC: 9.3 MG/DL (ref 8.5–10.1)
CHLORIDE SERPL-SCNC: 106 MMOL/L (ref 100–111)
CO2 SERPL-SCNC: 31 MMOL/L (ref 21–32)
CREAT SERPL-MCNC: 1.26 MG/DL (ref 0.6–1.3)
DIFFERENTIAL METHOD BLD: ABNORMAL
EKG ATRIAL RATE: 103 BPM
EKG DIAGNOSIS: NORMAL
EKG P AXIS: 53 DEGREES
EKG P-R INTERVAL: 136 MS
EKG Q-T INTERVAL: 384 MS
EKG QRS DURATION: 84 MS
EKG QTC CALCULATION (BAZETT): 503 MS
EKG R AXIS: 0 DEGREES
EKG T AXIS: -2 DEGREES
EKG VENTRICULAR RATE: 103 BPM
EOSINOPHIL # BLD: 0.2 K/UL (ref 0–0.4)
EOSINOPHIL NFR BLD: 3 % (ref 0–5)
ERYTHROCYTE [DISTWIDTH] IN BLOOD BY AUTOMATED COUNT: 14.6 % (ref 11.6–14.5)
GLUCOSE SERPL-MCNC: 122 MG/DL (ref 74–99)
HCT VFR BLD AUTO: 48.6 % (ref 36–48)
HGB BLD-MCNC: 16.2 G/DL (ref 13–16)
IMM GRANULOCYTES # BLD AUTO: 0 K/UL (ref 0–0.04)
IMM GRANULOCYTES NFR BLD AUTO: 0 % (ref 0–0.5)
LYMPHOCYTES # BLD: 3.1 K/UL (ref 0.9–3.6)
LYMPHOCYTES NFR BLD: 49 % (ref 21–52)
MCH RBC QN AUTO: 29.1 PG (ref 24–34)
MCHC RBC AUTO-ENTMCNC: 33.3 G/DL (ref 31–37)
MCV RBC AUTO: 87.3 FL (ref 78–100)
MONOCYTES # BLD: 0.6 K/UL (ref 0.05–1.2)
MONOCYTES NFR BLD: 10 % (ref 3–10)
NEUTS SEG # BLD: 2.4 K/UL (ref 1.8–8)
NEUTS SEG NFR BLD: 38 % (ref 40–73)
NRBC # BLD: 0 K/UL (ref 0–0.01)
NRBC BLD-RTO: 0 PER 100 WBC
NT PRO BNP: 2065 PG/ML (ref 0–1800)
PLATELET # BLD AUTO: 228 K/UL (ref 135–420)
PMV BLD AUTO: 9.4 FL (ref 9.2–11.8)
POTASSIUM SERPL-SCNC: 4.2 MMOL/L (ref 3.5–5.5)
RBC # BLD AUTO: 5.57 M/UL (ref 4.35–5.65)
SODIUM SERPL-SCNC: 141 MMOL/L (ref 136–145)
TROPONIN I SERPL HS-MCNC: 77 NG/L (ref 0–78)
TROPONIN I SERPL HS-MCNC: 99 NG/L (ref 0–78)
WBC # BLD AUTO: 6.4 K/UL (ref 4.6–13.2)

## 2023-06-27 PROCEDURE — 96374 THER/PROPH/DIAG INJ IV PUSH: CPT

## 2023-06-27 PROCEDURE — 99285 EMERGENCY DEPT VISIT HI MDM: CPT

## 2023-06-27 PROCEDURE — 83880 ASSAY OF NATRIURETIC PEPTIDE: CPT

## 2023-06-27 PROCEDURE — 93005 ELECTROCARDIOGRAM TRACING: CPT | Performed by: EMERGENCY MEDICINE

## 2023-06-27 PROCEDURE — 85025 COMPLETE CBC W/AUTO DIFF WBC: CPT

## 2023-06-27 PROCEDURE — 80048 BASIC METABOLIC PNL TOTAL CA: CPT

## 2023-06-27 PROCEDURE — 71045 X-RAY EXAM CHEST 1 VIEW: CPT

## 2023-06-27 PROCEDURE — 84484 ASSAY OF TROPONIN QUANT: CPT

## 2023-06-27 PROCEDURE — 6360000002 HC RX W HCPCS: Performed by: NURSE PRACTITIONER

## 2023-06-27 PROCEDURE — 93010 ELECTROCARDIOGRAM REPORT: CPT | Performed by: INTERNAL MEDICINE

## 2023-06-27 RX ORDER — FUROSEMIDE 10 MG/ML
40 INJECTION INTRAMUSCULAR; INTRAVENOUS
Status: COMPLETED | OUTPATIENT
Start: 2023-06-27 | End: 2023-06-27

## 2023-06-27 RX ADMIN — FUROSEMIDE 40 MG: 10 INJECTION, SOLUTION INTRAMUSCULAR; INTRAVENOUS at 13:16

## 2023-06-27 ASSESSMENT — PAIN - FUNCTIONAL ASSESSMENT
PAIN_FUNCTIONAL_ASSESSMENT: NONE - DENIES PAIN
PAIN_FUNCTIONAL_ASSESSMENT: NONE - DENIES PAIN

## 2023-08-15 ENCOUNTER — TRANSCRIBE ORDERS (OUTPATIENT)
Facility: HOSPITAL | Age: 78
End: 2023-08-15

## 2023-08-15 DIAGNOSIS — I73.9 PERIPHERAL VASCULAR DISEASE (HCC): Primary | ICD-10-CM

## 2023-10-04 ENCOUNTER — HOSPITAL ENCOUNTER (OUTPATIENT)
Facility: HOSPITAL | Age: 78
Discharge: HOME | End: 2023-10-06
Payer: MEDICARE

## 2023-10-04 DIAGNOSIS — I73.9 PERIPHERAL VASCULAR DISEASE (HCC): ICD-10-CM

## 2023-10-04 LAB
VAS LEFT CFA DIST PSV: 87.1 CM/S
VAS LEFT CFA PROX PSV: 87.1 CM/S
VAS LEFT PERONEAL DIST PSV: 45.3 CM/S
VAS LEFT PFA PROX PSV: 48.2 CM/S
VAS LEFT POP A DIST PSV: 49.1 CM/S
VAS LEFT POP A DIST VEL RATIO: 1.17
VAS LEFT POP A MID PSV: 41.9 CM/S
VAS LEFT POP A MID VEL RATIO: 0.74
VAS LEFT POP A PROX PSV: 56.7 CM/S
VAS LEFT POP A PROX VEL RATIO: 0.76
VAS LEFT PTA PROX PSV: 30 CM/S
VAS LEFT SFA DIST PSV: 74.5 CM/S
VAS LEFT SFA DIST VEL RATIO: 1.26
VAS LEFT SFA MID PSV: 58.9 CM/S
VAS LEFT SFA MID VEL RATIO: 1.07
VAS LEFT SFA PROX PSV: 54.8 CM/S
VAS LEFT SFA PROX VEL RATIO: 0.63
VAS RIGHT CFA DIST PSV: 53.7 CM/S
VAS RIGHT CFA PROX PSV: 53.7 CM/S
VAS RIGHT PFA PROX PSV: 53.2 CM/S
VAS RIGHT POP A DIST PSV: 55.9 CM/S
VAS RIGHT POP A DIST VEL RATIO: 1.61
VAS RIGHT POP A MID PSV: 34.8 CM/S
VAS RIGHT POP A MID VEL RATIO: 1.14
VAS RIGHT POP A PROX PSV: 30.6 CM/S
VAS RIGHT POP A PROX VEL RATIO: 0.17
VAS RIGHT PTA DIST PSV: 11.2 CM/S
VAS RIGHT PTA MID PSV: 57.2 CM/S
VAS RIGHT PTA PROX PSV: 25 CM/S
VAS RIGHT SFA DIST PSV: 183 CM/S
VAS RIGHT SFA DIST VEL RATIO: 3.41
VAS RIGHT SFA MID PSV: 53.7 CM/S
VAS RIGHT SFA MID VEL RATIO: 0.6
VAS RIGHT SFA PROX PSV: 93.5 CM/S
VAS RIGHT SFA PROX VEL RATIO: 1.7

## 2023-10-04 PROCEDURE — 93925 LOWER EXTREMITY STUDY: CPT

## 2023-12-30 ENCOUNTER — HOSPITAL ENCOUNTER (EMERGENCY)
Facility: HOSPITAL | Age: 78
Discharge: HOME | End: 2023-12-30
Attending: EMERGENCY MEDICINE
Payer: MEDICARE

## 2023-12-30 ENCOUNTER — APPOINTMENT (OUTPATIENT)
Facility: HOSPITAL | Age: 78
End: 2023-12-30
Payer: MEDICARE

## 2023-12-30 VITALS
OXYGEN SATURATION: 98 % | HEIGHT: 74 IN | SYSTOLIC BLOOD PRESSURE: 113 MMHG | WEIGHT: 210 LBS | TEMPERATURE: 97.1 F | RESPIRATION RATE: 21 BRPM | DIASTOLIC BLOOD PRESSURE: 76 MMHG | HEART RATE: 78 BPM | BODY MASS INDEX: 26.95 KG/M2

## 2023-12-30 DIAGNOSIS — K21.9 GASTROESOPHAGEAL REFLUX DISEASE WITHOUT ESOPHAGITIS: Primary | ICD-10-CM

## 2023-12-30 LAB
ALBUMIN SERPL-MCNC: 3.6 G/DL (ref 3.4–5)
ALBUMIN/GLOB SERPL: 1 (ref 0.8–1.7)
ALP SERPL-CCNC: 74 U/L (ref 45–117)
ALT SERPL-CCNC: 17 U/L (ref 16–61)
ANION GAP SERPL CALC-SCNC: 4 MMOL/L (ref 3–18)
APPEARANCE UR: CLEAR
AST SERPL-CCNC: 20 U/L (ref 10–38)
BASOPHILS # BLD: 0 K/UL (ref 0–0.1)
BASOPHILS NFR BLD: 1 % (ref 0–2)
BILIRUB SERPL-MCNC: 0.6 MG/DL (ref 0.2–1)
BILIRUB UR QL: NEGATIVE
BUN SERPL-MCNC: 25 MG/DL (ref 7–18)
BUN/CREAT SERPL: 17 (ref 12–20)
CALCIUM SERPL-MCNC: 9.5 MG/DL (ref 8.5–10.1)
CHLORIDE SERPL-SCNC: 103 MMOL/L (ref 100–111)
CO2 SERPL-SCNC: 29 MMOL/L (ref 21–32)
COLOR UR: YELLOW
CREAT SERPL-MCNC: 1.43 MG/DL (ref 0.6–1.3)
DIFFERENTIAL METHOD BLD: ABNORMAL
EKG ATRIAL RATE: 81 BPM
EKG DIAGNOSIS: NORMAL
EKG P AXIS: 30 DEGREES
EKG P-R INTERVAL: 130 MS
EKG Q-T INTERVAL: 350 MS
EKG QRS DURATION: 74 MS
EKG QTC CALCULATION (BAZETT): 406 MS
EKG R AXIS: 11 DEGREES
EKG T AXIS: 69 DEGREES
EKG VENTRICULAR RATE: 81 BPM
EOSINOPHIL # BLD: 0.2 K/UL (ref 0–0.4)
EOSINOPHIL NFR BLD: 3 % (ref 0–5)
ERYTHROCYTE [DISTWIDTH] IN BLOOD BY AUTOMATED COUNT: 14 % (ref 11.6–14.5)
GLOBULIN SER CALC-MCNC: 3.6 G/DL (ref 2–4)
GLUCOSE SERPL-MCNC: 84 MG/DL (ref 74–99)
GLUCOSE UR STRIP.AUTO-MCNC: NEGATIVE MG/DL
HCT VFR BLD AUTO: 46.7 % (ref 36–48)
HGB BLD-MCNC: 15.3 G/DL (ref 13–16)
HGB UR QL STRIP: NEGATIVE
IMM GRANULOCYTES # BLD AUTO: 0 K/UL (ref 0–0.04)
IMM GRANULOCYTES NFR BLD AUTO: 1 % (ref 0–0.5)
KETONES UR QL STRIP.AUTO: NEGATIVE MG/DL
LEUKOCYTE ESTERASE UR QL STRIP.AUTO: NEGATIVE
LIPASE SERPL-CCNC: 87 U/L (ref 13–75)
LYMPHOCYTES # BLD: 2.5 K/UL (ref 0.9–3.6)
LYMPHOCYTES NFR BLD: 42 % (ref 21–52)
MCH RBC QN AUTO: 29.3 PG (ref 24–34)
MCHC RBC AUTO-ENTMCNC: 32.8 G/DL (ref 31–37)
MCV RBC AUTO: 89.5 FL (ref 78–100)
MONOCYTES # BLD: 0.6 K/UL (ref 0.05–1.2)
MONOCYTES NFR BLD: 10 % (ref 3–10)
NEUTS SEG # BLD: 2.7 K/UL (ref 1.8–8)
NEUTS SEG NFR BLD: 44 % (ref 40–73)
NITRITE UR QL STRIP.AUTO: NEGATIVE
NRBC # BLD: 0 K/UL (ref 0–0.01)
NRBC BLD-RTO: 0 PER 100 WBC
PH UR STRIP: 5.5 (ref 5–8)
PLATELET # BLD AUTO: 285 K/UL (ref 135–420)
PMV BLD AUTO: 9 FL (ref 9.2–11.8)
POTASSIUM SERPL-SCNC: 4.4 MMOL/L (ref 3.5–5.5)
PROT SERPL-MCNC: 7.2 G/DL (ref 6.4–8.2)
PROT UR STRIP-MCNC: NEGATIVE MG/DL
RBC # BLD AUTO: 5.22 M/UL (ref 4.35–5.65)
SODIUM SERPL-SCNC: 136 MMOL/L (ref 136–145)
SP GR UR REFRACTOMETRY: 1.02 (ref 1–1.03)
TROPONIN I SERPL HS-MCNC: 19 NG/L (ref 0–78)
TROPONIN I SERPL HS-MCNC: 21 NG/L (ref 0–78)
TSH SERPL DL<=0.05 MIU/L-ACNC: 1.41 UIU/ML (ref 0.36–3.74)
UROBILINOGEN UR QL STRIP.AUTO: 1 EU/DL (ref 0.2–1)
WBC # BLD AUTO: 6.1 K/UL (ref 4.6–13.2)

## 2023-12-30 PROCEDURE — 94761 N-INVAS EAR/PLS OXIMETRY MLT: CPT

## 2023-12-30 PROCEDURE — 99285 EMERGENCY DEPT VISIT HI MDM: CPT

## 2023-12-30 PROCEDURE — 93010 ELECTROCARDIOGRAM REPORT: CPT | Performed by: INTERNAL MEDICINE

## 2023-12-30 PROCEDURE — 81003 URINALYSIS AUTO W/O SCOPE: CPT

## 2023-12-30 PROCEDURE — 85025 COMPLETE CBC W/AUTO DIFF WBC: CPT

## 2023-12-30 PROCEDURE — 80053 COMPREHEN METABOLIC PANEL: CPT

## 2023-12-30 PROCEDURE — 71045 X-RAY EXAM CHEST 1 VIEW: CPT

## 2023-12-30 PROCEDURE — 84443 ASSAY THYROID STIM HORMONE: CPT

## 2023-12-30 PROCEDURE — 84484 ASSAY OF TROPONIN QUANT: CPT

## 2023-12-30 PROCEDURE — 83690 ASSAY OF LIPASE: CPT

## 2023-12-30 PROCEDURE — 93005 ELECTROCARDIOGRAM TRACING: CPT | Performed by: EMERGENCY MEDICINE

## 2023-12-30 ASSESSMENT — LIFESTYLE VARIABLES
HOW MANY STANDARD DRINKS CONTAINING ALCOHOL DO YOU HAVE ON A TYPICAL DAY: PATIENT DOES NOT DRINK
HOW OFTEN DO YOU HAVE A DRINK CONTAINING ALCOHOL: NEVER

## 2023-12-30 NOTE — ED NOTES
Discharge medications reviewed with the patient and appropriate educational materials and side effects teaching were provided. Patient escorted to waiting room.

## 2023-12-30 NOTE — ED PROVIDER NOTES
Absolute 0.6 0.05 - 1.2 K/UL    Eosinophils Absolute 0.2 0.0 - 0.4 K/UL    Basophils Absolute 0.0 0.0 - 0.1 K/UL    Absolute Immature Granulocyte 0.0 0.00 - 0.04 K/UL    Differential Type AUTOMATED     CMP    Collection Time: 12/30/23  2:15 PM   Result Value Ref Range    Sodium 136 136 - 145 mmol/L    Potassium 4.4 3.5 - 5.5 mmol/L    Chloride 103 100 - 111 mmol/L    CO2 29 21 - 32 mmol/L    Anion Gap 4 3.0 - 18 mmol/L    Glucose 84 74 - 99 mg/dL    BUN 25 (H) 7.0 - 18 MG/DL    Creatinine 1.43 (H) 0.6 - 1.3 MG/DL    Bun/Cre Ratio 17 12 - 20      Est, Glom Filt Rate 50 (L) >60 ml/min/1.73m2    Calcium 9.5 8.5 - 10.1 MG/DL    Total Bilirubin 0.6 0.2 - 1.0 MG/DL    ALT 17 16 - 61 U/L    AST 20 10 - 38 U/L    Alk Phosphatase 74 45 - 117 U/L    Total Protein 7.2 6.4 - 8.2 g/dL    Albumin 3.6 3.4 - 5.0 g/dL    Globulin 3.6 2.0 - 4.0 g/dL    Albumin/Globulin Ratio 1.0 0.8 - 1.7     Lipase    Collection Time: 12/30/23  2:15 PM   Result Value Ref Range    Lipase 87 (H) 13 - 75 U/L   TSH    Collection Time: 12/30/23  2:15 PM   Result Value Ref Range    TSH, 3RD GENERATION 1.41 0.36 - 3.74 uIU/mL   Troponin    Collection Time: 12/30/23  2:15 PM   Result Value Ref Range    Troponin, High Sensitivity 19 0 - 78 ng/L   Urinalysis    Collection Time: 12/30/23  2:15 PM   Result Value Ref Range    Color, UA YELLOW      Appearance CLEAR      Specific Gravity, UA 1.022 1.005 - 1.030      pH, Urine 5.5 5.0 - 8.0      Protein, UA Negative NEG mg/dL    Glucose, UA Negative NEG mg/dL    Ketones, Urine Negative NEG mg/dL    Bilirubin Urine Negative NEG      Blood, Urine Negative NEG      Urobilinogen, Urine 1.0 0.2 - 1.0 EU/dL    Nitrite, Urine Negative NEG      Leukocyte Esterase, Urine Negative NEG     Troponin    Collection Time: 12/30/23  5:02 PM   Result Value Ref Range    Troponin, High Sensitivity 21 0 - 78 ng/L       RADIOLOGIC STUDIES:   Non x-ray images such as CT, Ultrasound and MRI are read by the radiologist. X-ray images are Disposition   Diagnosis:   1. Gastroesophageal reflux disease without esophagitis          Disposition:    DISPOSITION Decision To Discharge 12/30/2023 05:47:20 PM    Home or Self Care     [unfilled]      [email or web address] Disclaimer     Please note that this dictation was completed with [employer], the computer voice recognition software. Quite often unanticipated grammatical, syntax, homophones, and other interpretive errors are inadvertently transcribed by the computer software. Please disregard these errors. Please excuse any errors that have escaped final proofreading.      I Sergei Toribio DO am the primary clinician of record.  Sergei Toribio DO  (Electronically signed)           Sergei Toribio DO  12/30/23 [id number]

## 2023-12-30 NOTE — ED TRIAGE NOTES
Pt here for many chronic complaints, including:    - Pain/obstructed feeling when swallowing  - Gastric reflux  - General GI/gas pains  - Dizzy when he bends over  - Maybe a UTI    - Feeling of swollen lips  Pt does appear to have swollen upper lip, which he says started last night. Pt states in the past when he calls his PCP for his issues, he always advises to just go to the ED. All VS normal. Pt A/Ox4. Currently denying SOB or CP.

## 2024-02-09 ENCOUNTER — HOSPITAL ENCOUNTER (OUTPATIENT)
Facility: HOSPITAL | Age: 79
End: 2024-02-09
Payer: MEDICARE

## 2024-02-09 DIAGNOSIS — R13.10 ABNORMAL SWALLOWING: ICD-10-CM

## 2024-02-09 DIAGNOSIS — K21.00 GASTROESOPHAGEAL REFLUX DISEASE WITH ESOPHAGITIS, UNSPECIFIED WHETHER HEMORRHAGE: ICD-10-CM

## 2024-02-09 DIAGNOSIS — Z12.11 COLON CANCER SCREENING: ICD-10-CM

## 2024-02-09 DIAGNOSIS — R55 SYNCOPE AND COLLAPSE: ICD-10-CM

## 2024-02-09 DIAGNOSIS — I50.32 CHRONIC DIASTOLIC HEART FAILURE (HCC): ICD-10-CM

## 2024-02-09 PROCEDURE — 2500000003 HC RX 250 WO HCPCS: Performed by: PHYSICIAN ASSISTANT

## 2024-02-09 PROCEDURE — 6370000000 HC RX 637 (ALT 250 FOR IP): Performed by: PHYSICIAN ASSISTANT

## 2024-02-09 PROCEDURE — 74230 X-RAY XM SWLNG FUNCJ C+: CPT

## 2024-02-09 PROCEDURE — 92611 MOTION FLUOROSCOPY/SWALLOW: CPT

## 2024-02-09 PROCEDURE — 74220 X-RAY XM ESOPHAGUS 1CNTRST: CPT

## 2024-02-09 RX ADMIN — BARIUM SULFATE 90 ML: 960 POWDER, FOR SUSPENSION ORAL at 11:20

## 2024-02-09 RX ADMIN — BARIUM SULFATE 1 TABLET: 700 TABLET ORAL at 12:42

## 2024-02-09 RX ADMIN — ANTACID/ANTIFLATULENT 1 EACH: 380; 550; 10; 10 GRANULE, EFFERVESCENT ORAL at 12:43

## 2024-02-09 RX ADMIN — BARIUM SULFATE 15 ML: 400 PASTE ORAL at 11:20

## 2024-02-09 RX ADMIN — BARIUM SULFATE 140 ML: 980 POWDER, FOR SUSPENSION ORAL at 12:43

## 2024-02-09 NOTE — PROGRESS NOTES
[facility]  SPEECH LANGUAGE PATHOLOGY OUTPATIENT MODIFIED BARIUM SWALLOW STUDY    Patient: Brian Parker (78 y.o. male)  Date: 2/9/2024  Primary Diagnosis: Abnormal swallowing [R13.10]  Gastroesophageal reflux disease with esophagitis, unspecified whether hemorrhage [K21.00]  Colon cancer screening [Z12.11]  Chronic diastolic heart failure (HCC) [I50.32]  Syncope and collapse [R55]  BMI 28.0-28.9,adult [Z68.28]   Day of Surgery   Precautions: Aspiration    Assessment:  Based on the objective data below, Pt demonstrating oropharyngeal fxn that in WNL. Pt tolerated reg solids, puree, thin liquids +/- straw, and 13mm Ba pill with thin liquid wash without any aspiration/penetration events. Pt with adequate mastication with timely a-p transit and function laryngeal elevation/excursion across all consistencies. Pt with mild vallecular/pyriform residuals-cleared with second volitional swallow. Where Pt stated he feels the liquids/solids are stuck there was what could be considered an osteophyte, which can impact swallowing. Pt tolerated thin liquids in AP view without aspiration/penetration, vocal fold adduction was complete in phonation task, and bolus clearance was symmetrical.  Recommend regular solids and thin liquids, aspiration precautions, oral care TID, and meds as tolerated. Additionally, recommend Pt follows up with GI to address globus sensation Pt is reporting with meals.        Recommendations:   Regular diet with thin liquids  Aspiration precautions  HOB >45 during po intake, remain >30 for 30-45 minutes after po   Small bites/sips; alternate liquid/solid with slow feeding rate   Oral care TID  Meds per pt preference     SUBJECTIVE:   Patient stated “Thank you very much”.    OBJECTIVE:     Past Medical History:   Diagnosis Date    Abdominal pain     Benign essential HTN 2/17/2016    Dyslipidemia 1/24/2017    Esophageal reflux     Heartburn     High cholesterol     Incontinence   regarding diet recommendations and thickener instructions provided.  []  Patient/family have participated as able in goal setting and plan of care  []  Patient/family agree to work toward stated goals and plan of care  []  Patient understands intent and goals of therapy but is neutral about his/her participation  []  Patient unable to participate in goal setting/plan of care secondary to cognition, hearing/vision deficits; education ongoing with interdisciplinary staff    Thank you for this referral.    Zakia Armendariz M.S. CCC-SLP  Speech Language Pathologist

## 2024-06-20 ENCOUNTER — HOSPITAL ENCOUNTER (EMERGENCY)
Facility: HOSPITAL | Age: 79
Discharge: HOME | End: 2024-06-20
Payer: MEDICARE

## 2024-06-20 ENCOUNTER — APPOINTMENT (OUTPATIENT)
Facility: HOSPITAL | Age: 79
End: 2024-06-20
Payer: MEDICARE

## 2024-06-20 VITALS
RESPIRATION RATE: 18 BRPM | HEIGHT: 74 IN | OXYGEN SATURATION: 99 % | HEART RATE: 96 BPM | DIASTOLIC BLOOD PRESSURE: 90 MMHG | WEIGHT: 230 LBS | BODY MASS INDEX: 29.52 KG/M2 | TEMPERATURE: 98.2 F | SYSTOLIC BLOOD PRESSURE: 134 MMHG

## 2024-06-20 DIAGNOSIS — R31.0 INTERMITTENT GROSS HEMATURIA: Primary | ICD-10-CM

## 2024-06-20 LAB
ALBUMIN SERPL-MCNC: 3.4 G/DL (ref 3.4–5)
ALBUMIN/GLOB SERPL: 0.8 (ref 0.8–1.7)
ALP SERPL-CCNC: 64 U/L (ref 45–117)
ALT SERPL-CCNC: 15 U/L (ref 16–61)
ANION GAP SERPL CALC-SCNC: 2 MMOL/L (ref 3–18)
APPEARANCE UR: CLEAR
AST SERPL-CCNC: 15 U/L (ref 10–38)
BASOPHILS # BLD: 0 K/UL (ref 0–0.1)
BASOPHILS NFR BLD: 1 % (ref 0–2)
BILIRUB SERPL-MCNC: 0.4 MG/DL (ref 0.2–1)
BILIRUB UR QL: NEGATIVE
BUN SERPL-MCNC: 15 MG/DL (ref 7–18)
BUN/CREAT SERPL: 13 (ref 12–20)
CALCIUM SERPL-MCNC: 9.9 MG/DL (ref 8.5–10.1)
CHLORIDE SERPL-SCNC: 106 MMOL/L (ref 100–111)
CO2 SERPL-SCNC: 32 MMOL/L (ref 21–32)
COLOR UR: YELLOW
CREAT SERPL-MCNC: 1.15 MG/DL (ref 0.6–1.3)
DIFFERENTIAL METHOD BLD: ABNORMAL
EOSINOPHIL # BLD: 0.2 K/UL (ref 0–0.4)
EOSINOPHIL NFR BLD: 3 % (ref 0–5)
EPITH CASTS URNS QL MICRO: ABNORMAL /LPF (ref 0–5)
ERYTHROCYTE [DISTWIDTH] IN BLOOD BY AUTOMATED COUNT: 13.6 % (ref 11.6–14.5)
GLOBULIN SER CALC-MCNC: 4.4 G/DL (ref 2–4)
GLUCOSE SERPL-MCNC: 98 MG/DL (ref 74–99)
GLUCOSE UR STRIP.AUTO-MCNC: NEGATIVE MG/DL
HCT VFR BLD AUTO: 42.1 % (ref 36–48)
HGB BLD-MCNC: 13.8 G/DL (ref 13–16)
HGB UR QL STRIP: NEGATIVE
HYALINE CASTS URNS QL MICRO: ABNORMAL /LPF (ref 0–2)
IMM GRANULOCYTES # BLD AUTO: 0 K/UL (ref 0–0.04)
IMM GRANULOCYTES NFR BLD AUTO: 1 % (ref 0–0.5)
KETONES UR QL STRIP.AUTO: NEGATIVE MG/DL
LEUKOCYTE ESTERASE UR QL STRIP.AUTO: NEGATIVE
LYMPHOCYTES # BLD: 2.5 K/UL (ref 0.9–3.6)
LYMPHOCYTES NFR BLD: 37 % (ref 21–52)
MCH RBC QN AUTO: 28.8 PG (ref 24–34)
MCHC RBC AUTO-ENTMCNC: 32.8 G/DL (ref 31–37)
MCV RBC AUTO: 87.9 FL (ref 78–100)
MONOCYTES # BLD: 0.7 K/UL (ref 0.05–1.2)
MONOCYTES NFR BLD: 10 % (ref 3–10)
MUCOUS THREADS URNS QL MICRO: ABNORMAL /LPF
NEUTS SEG # BLD: 3.3 K/UL (ref 1.8–8)
NEUTS SEG NFR BLD: 49 % (ref 40–73)
NITRITE UR QL STRIP.AUTO: NEGATIVE
NRBC # BLD: 0 K/UL (ref 0–0.01)
NRBC BLD-RTO: 0 PER 100 WBC
PH UR STRIP: 5.5 (ref 5–8)
PLATELET # BLD AUTO: 293 K/UL (ref 135–420)
PMV BLD AUTO: 9.3 FL (ref 9.2–11.8)
POTASSIUM SERPL-SCNC: 4.1 MMOL/L (ref 3.5–5.5)
PROT SERPL-MCNC: 7.8 G/DL (ref 6.4–8.2)
PROT UR STRIP-MCNC: ABNORMAL MG/DL
RBC # BLD AUTO: 4.79 M/UL (ref 4.35–5.65)
RBC #/AREA URNS HPF: ABNORMAL /HPF (ref 0–5)
SODIUM SERPL-SCNC: 140 MMOL/L (ref 136–145)
SP GR UR REFRACTOMETRY: 1.02 (ref 1–1.03)
UROBILINOGEN UR QL STRIP.AUTO: 1 EU/DL (ref 0.2–1)
WBC # BLD AUTO: 6.7 K/UL (ref 4.6–13.2)
WBC URNS QL MICRO: ABNORMAL /HPF (ref 0–4)

## 2024-06-20 PROCEDURE — 85025 COMPLETE CBC W/AUTO DIFF WBC: CPT

## 2024-06-20 PROCEDURE — 80053 COMPREHEN METABOLIC PANEL: CPT

## 2024-06-20 PROCEDURE — 74177 CT ABD & PELVIS W/CONTRAST: CPT

## 2024-06-20 PROCEDURE — 81001 URINALYSIS AUTO W/SCOPE: CPT

## 2024-06-20 PROCEDURE — 99285 EMERGENCY DEPT VISIT HI MDM: CPT

## 2024-06-20 PROCEDURE — 76870 US EXAM SCROTUM: CPT

## 2024-06-20 PROCEDURE — 6360000004 HC RX CONTRAST MEDICATION

## 2024-06-20 RX ADMIN — IOPAMIDOL 100 ML: 612 INJECTION, SOLUTION INTRAVENOUS at 19:14

## 2024-06-20 ASSESSMENT — ENCOUNTER SYMPTOMS
ABDOMINAL PAIN: 1
NAUSEA: 0
CONSTIPATION: 0
SHORTNESS OF BREATH: 0
COUGH: 0
CHEST TIGHTNESS: 0
DIARRHEA: 0
VOMITING: 0

## 2024-06-20 NOTE — ED PROVIDER NOTES
EMERGENCY DEPARTMENT HISTORY AND PHYSICAL EXAM    8:02 PM      Date: 6/20/2024  Patient Name: Brian Parker    History of Presenting Illness     Chief Complaint   Patient presents with    Pelvic Pain    Hematuria         History Provided By: the patient.     Additional History (Context): Brian Parker is a 79 y.o. male with a history of hypertension, dyslipidemia, GERD, and urinary incontinence presenting to the ED due concerns for a UTI.  Patient reports for the last few months he has been noting intermittent blood in his urine as well as dysuria.  Reports that he has followed up with his primary care doctor, and is scheduled to follow-up with urology on Monday.  Reports that symptoms seem to be getting worse which is why he came in today.  Also admits to discomfort in his testicles.  Denies any swelling or redness.  Denies chest pain or shortness of breath.  Denies fever or chills.      PCP: Brian Welch II, MD    No current facility-administered medications for this encounter.     Current Outpatient Medications   Medication Sig Dispense Refill    aspirin 81 MG EC tablet Take by mouth daily      furosemide (LASIX) 40 MG tablet Take 40 mg by mouth daily      lansoprazole (PREVACID) 30 MG delayed release capsule Take by mouth every morning (before breakfast)      meloxicam (MOBIC) 15 MG tablet Take 15 mg by mouth daily      metoprolol tartrate (LOPRESSOR) 25 MG tablet Take 12.5 mg by mouth 2 times daily      simvastatin (ZOCOR) 20 MG tablet Take 20 mg by mouth         Past History     Past Medical History:  Past Medical History:   Diagnosis Date    Abdominal pain     Benign essential HTN 2/17/2016    Dyslipidemia 1/24/2017    Esophageal reflux     Heartburn     High cholesterol     Incontinence     Prostate cancer screening     Swelling of both lower extremities     Urine incontinence        Past Surgical History:  Past Surgical History:   Procedure Laterality Date    CHOLECYSTECTOMY      GI      HERNIA

## 2024-06-20 NOTE — DISCHARGE INSTRUCTIONS
Please follow-up with both your PCP and Urologist within the next few days in order to be re-evaluated.  You were provided a print out of all your results. Please take this to your appointment to discuss all findings.   Return to the ED with any new or worsening symptoms.

## 2024-06-20 NOTE — ED TRIAGE NOTES
Patient presents to the ed he thinks he has a UTI. Patient complains of right lower pelvic pain and hematuria. Pt states that this has been going on for a couple of months

## 2024-06-20 NOTE — ED NOTES
Patient is discharged at this time. Patient has been updated on plan of care. Patient has to questions or concerns at this time. Patient's IV has been removed.

## 2024-10-17 ENCOUNTER — HOSPITAL ENCOUNTER (OUTPATIENT)
Facility: HOSPITAL | Age: 79
Discharge: HOME | End: 2024-10-20
Payer: MEDICARE

## 2024-10-17 ENCOUNTER — HOSPITAL ENCOUNTER (OUTPATIENT)
Facility: HOSPITAL | Age: 79
Discharge: HOME | End: 2024-10-19
Payer: MEDICARE

## 2024-10-17 VITALS
BODY MASS INDEX: 30.29 KG/M2 | WEIGHT: 236 LBS | SYSTOLIC BLOOD PRESSURE: 113 MMHG | DIASTOLIC BLOOD PRESSURE: 79 MMHG | HEART RATE: 61 BPM | HEIGHT: 74 IN

## 2024-10-17 DIAGNOSIS — I50.9 HEART FAILURE, UNSPECIFIED HF CHRONICITY, UNSPECIFIED HEART FAILURE TYPE (HCC): ICD-10-CM

## 2024-10-17 LAB
ECHO BSA: 2.36 M2
NUC STRESS EJECTION FRACTION: 33 %
STRESS BASELINE DIAS BP: 79 MMHG
STRESS BASELINE HR: 81 BPM
STRESS BASELINE SYS BP: 113 MMHG
STRESS ESTIMATED WORKLOAD: 1 METS
STRESS PEAK DIAS BP: 79 MMHG
STRESS PEAK SYS BP: 113 MMHG
STRESS PERCENT HR ACHIEVED: 65 %
STRESS POST PEAK HR: 91 BPM
STRESS RATE PRESSURE PRODUCT: NORMAL BPM*MMHG
STRESS STAGE 1 BP: NORMAL MMHG
STRESS STAGE 1 DURATION: 3 MIN:SEC
STRESS STAGE 1 HR: 87 BPM
STRESS STAGE RECOVERY 1 BP: NORMAL MMHG
STRESS STAGE RECOVERY 1 DURATION: 1 MIN:SEC
STRESS STAGE RECOVERY 1 HR: 82 BPM
STRESS TARGET HR: 140 BPM
TID: 0.8

## 2024-10-17 PROCEDURE — 78452 HT MUSCLE IMAGE SPECT MULT: CPT | Performed by: INTERNAL MEDICINE

## 2024-10-17 PROCEDURE — A9502 TC99M TETROFOSMIN: HCPCS

## 2024-10-17 PROCEDURE — 93016 CV STRESS TEST SUPVJ ONLY: CPT | Performed by: INTERNAL MEDICINE

## 2024-10-17 PROCEDURE — 93018 CV STRESS TEST I&R ONLY: CPT | Performed by: INTERNAL MEDICINE

## 2024-10-17 PROCEDURE — 2580000003 HC RX 258

## 2024-10-17 PROCEDURE — 6360000002 HC RX W HCPCS

## 2024-10-17 PROCEDURE — 93017 CV STRESS TEST TRACING ONLY: CPT

## 2024-10-17 PROCEDURE — 3430000000 HC RX DIAGNOSTIC RADIOPHARMACEUTICAL

## 2024-10-17 RX ORDER — REGADENOSON 0.08 MG/ML
0.4 INJECTION, SOLUTION INTRAVENOUS
Status: COMPLETED | OUTPATIENT
Start: 2024-10-17 | End: 2024-10-17

## 2024-10-17 RX ORDER — 0.9 % SODIUM CHLORIDE 0.9 %
250 INTRAVENOUS SOLUTION INTRAVENOUS
Status: COMPLETED | OUTPATIENT
Start: 2024-10-17 | End: 2024-10-17

## 2024-10-17 RX ADMIN — TETROFOSMIN 10.1 MILLICURIE: 1.38 INJECTION, POWDER, LYOPHILIZED, FOR SOLUTION INTRAVENOUS at 10:05

## 2024-10-17 RX ADMIN — TETROFOSMIN 30 MILLICURIE: 1.38 INJECTION, POWDER, LYOPHILIZED, FOR SOLUTION INTRAVENOUS at 11:35

## 2024-10-17 RX ADMIN — SODIUM CHLORIDE 250 ML: 9 INJECTION, SOLUTION INTRAVENOUS at 11:35

## 2024-10-17 RX ADMIN — REGADENOSON 0.4 MG: 0.08 INJECTION, SOLUTION INTRAVENOUS at 11:35

## 2024-10-18 ENCOUNTER — TELEPHONE (OUTPATIENT)
Age: 79
End: 2024-10-18

## 2024-10-18 NOTE — TELEPHONE ENCOUNTER
----- Message from Dr. Agus Del Angel MD sent at 10/17/2024  5:29 PM EDT -----  Patient needs appointment with me abnormal stress test

## 2024-10-18 NOTE — TELEPHONE ENCOUNTER
Called patient and left message on patients voicemail he is scheduled to see Dr Jay in the [place] location on 10/25/24 @ 11:30.

## 2024-10-19 NOTE — PROGRESS NOTES
HISTORY OF PRESENT ILLNESS  Brian Parker is a 79 y.o. male.    PMH HTN, HLD,   ----  CARDIAC STUDIES  ----  Nuclear stress test 10/17/2024    Stress Combined Conclusion: Findings suggest a moderate risk of cardiac events.    Perfusion Comments: LV perfusion is abnormal.    Perfusion Defect: There is a moderate severity global left ventricular stress perfusion defect that is medium in size present in the inferior segment(s) that is partially reversible.    Perfusion Conclusion: TID ratio is 0.80.    ECG: Resting ECG demonstrates normal sinus rhythm.    Stress Test: A pharmacological stress test was performed using regadenoson (Lexiscan). The patient reported nausea and no chest pain during the stress test. Hemodynamics are suboptimal due to medication. Blood pressure demonstrated a hypotensive response and heart rate demonstrated a normal response to stress. The patient's heart rate recovery was normal.     Medium risk stress test due to ejection fraction and reversible inferior defect present  ----    Right side findings: Resting YING is non-compressible (YING >1.4). Severely decreased resting right TBI.    Left side findings: This is moderately decreased. Moderately decreased resting left TBI.     By duplex  analysis there appears to be Bilateral  moderate peripheral arterial disease noted at the level of the calf with poorly biphasic doppler flow  YING unreliable due to non compressible vessels. Bilateral small vessel disease noted  ----  2d echo 11/25/2020   LV: Estimated LVEF is 45 - 50%. Visually measured ejection fraction. Normal cavity size. Mild concentric hypertrophy. Globally reduced systolic function. Mild (grade 1) left ventricular diastolic dysfunction.  · RV: Mildly dilated right ventricle.  · RA: Mildly dilated right atrium.  · MV: Mild mitral valve regurgitation is present.  · TV: Mild tricuspid valve regurgitation is present.  · PV: Mild pulmonic valve regurgitation is present.  · AO: Mild

## 2024-10-25 ENCOUNTER — OFFICE VISIT (OUTPATIENT)
Age: 79
End: 2024-10-25
Payer: MEDICARE

## 2024-10-25 VITALS
DIASTOLIC BLOOD PRESSURE: 77 MMHG | WEIGHT: 231 LBS | HEART RATE: 79 BPM | OXYGEN SATURATION: 94 % | SYSTOLIC BLOOD PRESSURE: 112 MMHG | BODY MASS INDEX: 29.65 KG/M2 | HEIGHT: 74 IN

## 2024-10-25 DIAGNOSIS — I42.8 OTHER CARDIOMYOPATHY (HCC): ICD-10-CM

## 2024-10-25 DIAGNOSIS — I50.32 CHRONIC HEART FAILURE WITH PRESERVED EJECTION FRACTION (HCC): ICD-10-CM

## 2024-10-25 DIAGNOSIS — E78.49 OTHER HYPERLIPIDEMIA: ICD-10-CM

## 2024-10-25 DIAGNOSIS — I10 HYPERTENSION, UNSPECIFIED TYPE: ICD-10-CM

## 2024-10-25 DIAGNOSIS — R06.09 DYSPNEA ON EXERTION: Primary | ICD-10-CM

## 2024-10-25 PROCEDURE — 99204 OFFICE O/P NEW MOD 45 MIN: CPT | Performed by: INTERNAL MEDICINE

## 2024-10-25 PROCEDURE — 1123F ACP DISCUSS/DSCN MKR DOCD: CPT | Performed by: INTERNAL MEDICINE

## 2024-10-25 PROCEDURE — 3074F SYST BP LT 130 MM HG: CPT | Performed by: INTERNAL MEDICINE

## 2024-10-25 PROCEDURE — 3078F DIAST BP <80 MM HG: CPT | Performed by: INTERNAL MEDICINE

## 2024-10-25 RX ORDER — BUMETANIDE 2 MG/1
2 TABLET ORAL 2 TIMES DAILY
Qty: 60 TABLET | Refills: 3 | Status: SHIPPED | OUTPATIENT
Start: 2024-10-25

## 2024-10-25 RX ORDER — POTASSIUM CHLORIDE 750 MG/1
10 TABLET, EXTENDED RELEASE ORAL 2 TIMES DAILY
Qty: 60 TABLET | Refills: 2 | Status: SHIPPED | OUTPATIENT
Start: 2024-10-25

## 2024-10-25 ASSESSMENT — ENCOUNTER SYMPTOMS
COUGH: 0
COLOR CHANGE: 0
WHEEZING: 0
APNEA: 0
DIARRHEA: 0
ABDOMINAL PAIN: 0
NAUSEA: 0
BLOOD IN STOOL: 0
CONSTIPATION: 0
SHORTNESS OF BREATH: 1
CHEST TIGHTNESS: 0

## 2024-10-25 NOTE — PATIENT INSTRUCTIONS
Learning About the Mediterranean Diet  What is the Mediterranean diet?     The Mediterranean diet is a style of eating rather than a diet plan. It features foods eaten in [place], [place], southern [place], and other countries along the [place] Sea. It emphasizes eating foods like fish, fruits, vegetables, beans, high-fiber breads and whole grains, nuts, and olive oil. This style of eating includes limited red meat, cheese, and sweets.  Why choose the Mediterranean diet?  A Mediterranean-style diet may improve heart health. It contains more fat than other heart-healthy diets. But the fats are mainly from nuts, unsaturated oils (such as fish oils and olive oil), and certain nut or seed oils (such as canola, soybean, or flaxseed oil). These fats may help protect the heart and blood vessels.  How can you get started on the Mediterranean diet?  Here are some things you can do to switch to a more Mediterranean way of eating.  What to eat  Eat a variety of fruits and vegetables each day, such as grapes, blueberries, tomatoes, broccoli, peppers, figs, olives, spinach, eggplant, beans, lentils, and chickpeas.  Eat a variety of whole-grain foods each day, such as oats, brown rice, and whole wheat bread, pasta, and couscous.  Eat fish at least 2 times a week. Try tuna, salmon, mackerel, lake trout, herring, or sardines.  Eat moderate amounts of low-fat dairy products, such as milk, cheese, or yogurt.  Eat moderate amounts of poultry and eggs.  Choose healthy (unsaturated) fats, such as nuts, olive oil, and certain nut or seed oils like canola, soybean, and flaxseed.  Limit unhealthy (saturated) fats, such as butter, palm oil, and coconut oil. And limit fats found in animal products, such as meat and dairy products made with whole milk. Try to eat red meat only a few times a month in very small amounts.  Limit sweets and desserts to only a few times a week. This includes sugar-sweetened drinks like soda.  The

## 2024-12-07 ENCOUNTER — HOSPITAL ENCOUNTER (EMERGENCY)
Facility: HOSPITAL | Age: 79
Discharge: HOME | End: 2024-12-07
Attending: STUDENT IN AN ORGANIZED HEALTH CARE EDUCATION/TRAINING PROGRAM
Payer: MEDICARE

## 2024-12-07 ENCOUNTER — APPOINTMENT (OUTPATIENT)
Facility: HOSPITAL | Age: 79
End: 2024-12-07
Payer: MEDICARE

## 2024-12-07 VITALS
OXYGEN SATURATION: 100 % | TEMPERATURE: 97.5 F | SYSTOLIC BLOOD PRESSURE: 105 MMHG | DIASTOLIC BLOOD PRESSURE: 70 MMHG | HEART RATE: 75 BPM | WEIGHT: 230 LBS | RESPIRATION RATE: 14 BRPM | HEIGHT: 73 IN | BODY MASS INDEX: 30.48 KG/M2

## 2024-12-07 DIAGNOSIS — R07.9 CHEST PAIN, UNSPECIFIED TYPE: Primary | ICD-10-CM

## 2024-12-07 LAB
ALBUMIN SERPL-MCNC: 3.5 G/DL (ref 3.4–5)
ALBUMIN/GLOB SERPL: 0.9 (ref 0.8–1.7)
ALP SERPL-CCNC: 68 U/L (ref 45–117)
ALT SERPL-CCNC: 20 U/L (ref 16–61)
ANION GAP SERPL CALC-SCNC: 5 MMOL/L (ref 3–18)
APPEARANCE UR: CLEAR
AST SERPL-CCNC: 27 U/L (ref 10–38)
BASOPHILS # BLD: 0 K/UL (ref 0–0.1)
BASOPHILS NFR BLD: 0 % (ref 0–2)
BILIRUB DIRECT SERPL-MCNC: 0.2 MG/DL (ref 0–0.2)
BILIRUB SERPL-MCNC: 0.9 MG/DL (ref 0.2–1)
BILIRUB UR QL: NEGATIVE
BUN SERPL-MCNC: 32 MG/DL (ref 7–18)
BUN/CREAT SERPL: 24 (ref 12–20)
CALCIUM SERPL-MCNC: 9.5 MG/DL (ref 8.5–10.1)
CHLORIDE SERPL-SCNC: 103 MMOL/L (ref 100–111)
CO2 SERPL-SCNC: 32 MMOL/L (ref 21–32)
COLOR UR: YELLOW
CREAT SERPL-MCNC: 1.35 MG/DL (ref 0.6–1.3)
DIFFERENTIAL METHOD BLD: ABNORMAL
EKG ATRIAL RATE: 72 BPM
EKG DIAGNOSIS: NORMAL
EKG P AXIS: 68 DEGREES
EKG P-R INTERVAL: 128 MS
EKG Q-T INTERVAL: 416 MS
EKG QRS DURATION: 82 MS
EKG QTC CALCULATION (BAZETT): 455 MS
EKG R AXIS: 6 DEGREES
EKG T AXIS: 25 DEGREES
EKG VENTRICULAR RATE: 72 BPM
EOSINOPHIL # BLD: 0.2 K/UL (ref 0–0.4)
EOSINOPHIL NFR BLD: 4 % (ref 0–5)
ERYTHROCYTE [DISTWIDTH] IN BLOOD BY AUTOMATED COUNT: 14.7 % (ref 11.6–14.5)
GLOBULIN SER CALC-MCNC: 3.8 G/DL (ref 2–4)
GLUCOSE SERPL-MCNC: 91 MG/DL (ref 74–99)
GLUCOSE UR STRIP.AUTO-MCNC: NEGATIVE MG/DL
HCT VFR BLD AUTO: 45 % (ref 36–48)
HGB BLD-MCNC: 14.5 G/DL (ref 13–16)
HGB UR QL STRIP: NEGATIVE
IMM GRANULOCYTES # BLD AUTO: 0 K/UL (ref 0–0.04)
IMM GRANULOCYTES NFR BLD AUTO: 1 % (ref 0–0.5)
KETONES UR QL STRIP.AUTO: NEGATIVE MG/DL
LEUKOCYTE ESTERASE UR QL STRIP.AUTO: NEGATIVE
LIPASE SERPL-CCNC: 76 U/L (ref 13–75)
LYMPHOCYTES # BLD: 2.5 K/UL (ref 0.9–3.6)
LYMPHOCYTES NFR BLD: 36 % (ref 21–52)
MAGNESIUM SERPL-MCNC: 2.1 MG/DL (ref 1.6–2.6)
MCH RBC QN AUTO: 28.2 PG (ref 24–34)
MCHC RBC AUTO-ENTMCNC: 32.2 G/DL (ref 31–37)
MCV RBC AUTO: 87.4 FL (ref 78–100)
MONOCYTES # BLD: 0.6 K/UL (ref 0.05–1.2)
MONOCYTES NFR BLD: 9 % (ref 3–10)
NEUTS SEG # BLD: 3.5 K/UL (ref 1.8–8)
NEUTS SEG NFR BLD: 50 % (ref 40–73)
NITRITE UR QL STRIP.AUTO: NEGATIVE
NRBC # BLD: 0 K/UL (ref 0–0.01)
NRBC BLD-RTO: 0 PER 100 WBC
NT PRO BNP: 116 PG/ML (ref 0–1800)
PH UR STRIP: 5 (ref 5–8)
PLATELET # BLD AUTO: 270 K/UL (ref 135–420)
PMV BLD AUTO: 8.7 FL (ref 9.2–11.8)
POTASSIUM SERPL-SCNC: 5 MMOL/L (ref 3.5–5.5)
PROT SERPL-MCNC: 7.3 G/DL (ref 6.4–8.2)
PROT UR STRIP-MCNC: NEGATIVE MG/DL
RBC # BLD AUTO: 5.15 M/UL (ref 4.35–5.65)
SODIUM SERPL-SCNC: 140 MMOL/L (ref 136–145)
SP GR UR REFRACTOMETRY: 1.01 (ref 1–1.03)
TROPONIN I SERPL HS-MCNC: 17 NG/L (ref 0–78)
UROBILINOGEN UR QL STRIP.AUTO: 0.2 EU/DL (ref 0.2–1)
WBC # BLD AUTO: 6.9 K/UL (ref 4.6–13.2)

## 2024-12-07 PROCEDURE — 87086 URINE CULTURE/COLONY COUNT: CPT

## 2024-12-07 PROCEDURE — 93010 ELECTROCARDIOGRAM REPORT: CPT | Performed by: INTERNAL MEDICINE

## 2024-12-07 PROCEDURE — 6370000000 HC RX 637 (ALT 250 FOR IP): Performed by: STUDENT IN AN ORGANIZED HEALTH CARE EDUCATION/TRAINING PROGRAM

## 2024-12-07 PROCEDURE — 80076 HEPATIC FUNCTION PANEL: CPT

## 2024-12-07 PROCEDURE — 83735 ASSAY OF MAGNESIUM: CPT

## 2024-12-07 PROCEDURE — 80048 BASIC METABOLIC PNL TOTAL CA: CPT

## 2024-12-07 PROCEDURE — 71045 X-RAY EXAM CHEST 1 VIEW: CPT

## 2024-12-07 PROCEDURE — 96374 THER/PROPH/DIAG INJ IV PUSH: CPT

## 2024-12-07 PROCEDURE — 81003 URINALYSIS AUTO W/O SCOPE: CPT

## 2024-12-07 PROCEDURE — 83880 ASSAY OF NATRIURETIC PEPTIDE: CPT

## 2024-12-07 PROCEDURE — 83690 ASSAY OF LIPASE: CPT

## 2024-12-07 PROCEDURE — 2500000003 HC RX 250 WO HCPCS: Performed by: STUDENT IN AN ORGANIZED HEALTH CARE EDUCATION/TRAINING PROGRAM

## 2024-12-07 PROCEDURE — 85025 COMPLETE CBC W/AUTO DIFF WBC: CPT

## 2024-12-07 PROCEDURE — 2580000003 HC RX 258: Performed by: STUDENT IN AN ORGANIZED HEALTH CARE EDUCATION/TRAINING PROGRAM

## 2024-12-07 PROCEDURE — 94761 N-INVAS EAR/PLS OXIMETRY MLT: CPT

## 2024-12-07 PROCEDURE — 93005 ELECTROCARDIOGRAM TRACING: CPT | Performed by: STUDENT IN AN ORGANIZED HEALTH CARE EDUCATION/TRAINING PROGRAM

## 2024-12-07 PROCEDURE — 99285 EMERGENCY DEPT VISIT HI MDM: CPT

## 2024-12-07 PROCEDURE — 84484 ASSAY OF TROPONIN QUANT: CPT

## 2024-12-07 RX ORDER — FAMOTIDINE 20 MG/1
20 TABLET, FILM COATED ORAL 2 TIMES DAILY
Qty: 24 TABLET | Refills: 1 | Status: SHIPPED | OUTPATIENT
Start: 2024-12-07

## 2024-12-07 RX ORDER — SUCRALFATE 1 G/1
1 TABLET ORAL 4 TIMES DAILY
Qty: 120 TABLET | Refills: 3 | Status: SHIPPED | OUTPATIENT
Start: 2024-12-07

## 2024-12-07 RX ORDER — ACETAMINOPHEN 500 MG
1000 TABLET ORAL
Status: COMPLETED | OUTPATIENT
Start: 2024-12-07 | End: 2024-12-07

## 2024-12-07 RX ADMIN — ACETAMINOPHEN 1000 MG: 500 TABLET ORAL at 14:55

## 2024-12-07 RX ADMIN — ALUMINUM HYDROXIDE AND MAGNESIUM HYDROXIDE 30 ML: 200; 200 SUSPENSION ORAL at 14:57

## 2024-12-07 RX ADMIN — FAMOTIDINE 20 MG: 10 INJECTION, SOLUTION INTRAVENOUS at 14:57

## 2024-12-07 ASSESSMENT — PAIN - FUNCTIONAL ASSESSMENT: PAIN_FUNCTIONAL_ASSESSMENT: 0-10

## 2024-12-07 ASSESSMENT — PAIN SCALES - GENERAL: PAINLEVEL_OUTOF10: 5

## 2024-12-07 ASSESSMENT — PAIN DESCRIPTION - DESCRIPTORS: DESCRIPTORS: SHARP

## 2024-12-07 ASSESSMENT — PAIN DESCRIPTION - ORIENTATION: ORIENTATION: LEFT;RIGHT

## 2024-12-07 ASSESSMENT — PAIN DESCRIPTION - LOCATION: LOCATION: CHEST;ABDOMEN

## 2024-12-07 NOTE — ED TRIAGE NOTES
Pt came ambulatory to triage c/o SOB, chest pain and abdominal pain for months now. Pt has a lot of concerns and reports some more symptoms. Per pt, he has been going to [facility] for the same symptoms.

## 2024-12-07 NOTE — ED PROVIDER NOTES
Sat Dec 07, 2024   1328 Patients CXR on my read does not show any acute cardiopulmonary abnormalities.   [DV]   1525 Patient's UA negative for infection.  Lab work significant for creatinine 1.35 Which is slightly up from baseline of 1.2, lipase 76, otherwise labs grossly within normal limits.  Will reevaluate patient for further disposition. [DV]   1619 On reevaluation patient appears well and comfortable.  Vital signs continue to be grossly within normal limits.  States improvement of his symptoms.  Patient states he has a cardiology appointment next week.  Symptoms are inconsistent with ACS.  Considered admission however do not feel patient would benefit from inpatient treatment and evaluation.  Will discharge patient home with strict return precautions and follow-up recommendations.  Patient verbalized understanding and is without further questions.  Comfortable with plan. [DV]      ED Course User Index  [DV] Bayron Francois Jr., DO           Procedures:  Procedures      Rhythm interpretation from monitor: Sinus rhythm      Social Determinants of Health: none       Supplemental Historians include: Patient, EMS       Documentation/Prior Results Review:  Old medical records.  Previous electrocardiograms.  Nursing notes.  Ambulance run sheet.      Discussion of Mangement with other Physicians, QHP or Appropriate Source:        Diagnosis and Disposition     CLINICAL IMPRESSION:  1. Chest pain, unspecified type         Medication List        START taking these medications      sucralfate 1 GM tablet  Commonly known as: Carafate  Take 1 tablet by mouth 4 times daily            CHANGE how you take these medications      * famotidine 40 MG tablet  Commonly known as: PEPCID  What changed: Another medication with the same name was added. Make sure you understand how and when to take each.     * famotidine 20 MG tablet  Commonly known as: PEPCID  Take 1 tablet by mouth 2 times daily  What changed: You were already

## 2024-12-08 LAB
BACTERIA SPEC CULT: ABNORMAL
CC UR VC: ABNORMAL
SERVICE CMNT-IMP: ABNORMAL

## 2024-12-20 ENCOUNTER — HOSPITAL ENCOUNTER (OUTPATIENT)
Facility: HOSPITAL | Age: 79
Setting detail: OUTPATIENT SURGERY
Discharge: HOME | End: 2024-12-20
Attending: INTERNAL MEDICINE | Admitting: INTERNAL MEDICINE
Payer: MEDICARE

## 2024-12-20 VITALS
SYSTOLIC BLOOD PRESSURE: 118 MMHG | TEMPERATURE: 97.5 F | HEART RATE: 50 BPM | DIASTOLIC BLOOD PRESSURE: 71 MMHG | OXYGEN SATURATION: 96 % | RESPIRATION RATE: 11 BRPM

## 2024-12-20 DIAGNOSIS — I20.89 ANGINA AT REST (HCC): ICD-10-CM

## 2024-12-20 PROCEDURE — 2709999900 HC NON-CHARGEABLE SUPPLY: Performed by: INTERNAL MEDICINE

## 2024-12-20 PROCEDURE — 6360000002 HC RX W HCPCS: Performed by: INTERNAL MEDICINE

## 2024-12-20 PROCEDURE — C1713 ANCHOR/SCREW BN/BN,TIS/BN: HCPCS | Performed by: INTERNAL MEDICINE

## 2024-12-20 PROCEDURE — C1769 GUIDE WIRE: HCPCS | Performed by: INTERNAL MEDICINE

## 2024-12-20 PROCEDURE — 76000 FLUOROSCOPY <1 HR PHYS/QHP: CPT | Performed by: INTERNAL MEDICINE

## 2024-12-20 PROCEDURE — C1894 INTRO/SHEATH, NON-LASER: HCPCS | Performed by: INTERNAL MEDICINE

## 2024-12-20 PROCEDURE — 6360000004 HC RX CONTRAST MEDICATION: Performed by: INTERNAL MEDICINE

## 2024-12-20 PROCEDURE — 7100000011 HC PHASE II RECOVERY - ADDTL 15 MIN: Performed by: INTERNAL MEDICINE

## 2024-12-20 PROCEDURE — 7100000010 HC PHASE II RECOVERY - FIRST 15 MIN: Performed by: INTERNAL MEDICINE

## 2024-12-20 PROCEDURE — 2580000003 HC RX 258: Performed by: INTERNAL MEDICINE

## 2024-12-20 PROCEDURE — 93458 L HRT ARTERY/VENTRICLE ANGIO: CPT | Performed by: INTERNAL MEDICINE

## 2024-12-20 RX ORDER — MIDAZOLAM HYDROCHLORIDE 1 MG/ML
INJECTION, SOLUTION INTRAMUSCULAR; INTRAVENOUS PRN
Status: DISCONTINUED | OUTPATIENT
Start: 2024-12-20 | End: 2024-12-20 | Stop reason: HOSPADM

## 2024-12-20 RX ORDER — HYDRALAZINE HYDROCHLORIDE 20 MG/ML
10 INJECTION INTRAMUSCULAR; INTRAVENOUS EVERY 10 MIN PRN
Status: DISCONTINUED | OUTPATIENT
Start: 2024-12-20 | End: 2024-12-20 | Stop reason: HOSPADM

## 2024-12-20 RX ORDER — SODIUM CHLORIDE 0.9 % (FLUSH) 0.9 %
5-40 SYRINGE (ML) INJECTION PRN
Status: DISCONTINUED | OUTPATIENT
Start: 2024-12-20 | End: 2024-12-20 | Stop reason: HOSPADM

## 2024-12-20 RX ORDER — SODIUM CHLORIDE 9 MG/ML
INJECTION, SOLUTION INTRAVENOUS PRN
Status: DISCONTINUED | OUTPATIENT
Start: 2024-12-20 | End: 2024-12-20 | Stop reason: HOSPADM

## 2024-12-20 RX ORDER — IODIXANOL 320 MG/ML
INJECTION, SOLUTION INTRAVASCULAR PRN
Status: DISCONTINUED | OUTPATIENT
Start: 2024-12-20 | End: 2024-12-20 | Stop reason: HOSPADM

## 2024-12-20 RX ORDER — FENTANYL CITRATE 50 UG/ML
INJECTION, SOLUTION INTRAMUSCULAR; INTRAVENOUS PRN
Status: DISCONTINUED | OUTPATIENT
Start: 2024-12-20 | End: 2024-12-20 | Stop reason: HOSPADM

## 2024-12-20 RX ORDER — ACETAMINOPHEN 325 MG/1
650 TABLET ORAL EVERY 4 HOURS PRN
Status: DISCONTINUED | OUTPATIENT
Start: 2024-12-20 | End: 2024-12-20 | Stop reason: HOSPADM

## 2024-12-20 RX ORDER — SODIUM CHLORIDE 9 MG/ML
INJECTION, SOLUTION INTRAVENOUS CONTINUOUS
Status: DISPENSED | OUTPATIENT
Start: 2024-12-20 | End: 2024-12-20

## 2024-12-20 RX ORDER — SODIUM CHLORIDE 0.9 % (FLUSH) 0.9 %
5-40 SYRINGE (ML) INJECTION EVERY 12 HOURS SCHEDULED
Status: DISCONTINUED | OUTPATIENT
Start: 2024-12-20 | End: 2024-12-20 | Stop reason: HOSPADM

## 2024-12-20 RX ADMIN — SODIUM CHLORIDE 150 ML/HR: 9 INJECTION, SOLUTION INTRAVENOUS at 10:30

## 2024-12-20 NOTE — PROCEDURES
[facility], [facility]  [address]  [place] [address]  Phone:[phone]  Fax: [phone]      Cardiac Catheterization Note  .  Procedure Date: 12/20/2024  .  PreOpDiagnosis: Angina Pectoris  .  Findings/PostOp Diagnosis  Insignificant Coronary Artery Disease  Cardiomyopathy  Normal LVEDP  .  Plan:  1. Routine post-cath care to monitor for vascular/ischemic complications.  2. Standard risk factor modification.  .  Procedures:  1. Left heart catheterization,   2. Selective coronary angiography  3. Left ventriculography      Description of procedure: Following signed, informed consent, the patient was brought to the cardiac catheterization laboratory in the fasting state and was prepped and draped in the usual sterile fashion for the procedure. A time-out was done by the staff.The right groin was exposed and 10cc of 1% Lidocaine was infiltrated into skin and tissues overlying the right femoral triangle. Next, utilizing a Seldinger micropuncture needle and a guide wire, a 5-[place]ian side-arm vascular sheath was placed into the right femoral artery under fluoroscopic guidance. The guide wire was removed and the sheath was flushed with heparinized saline. Subsequently the  5-JL4 and 5-JR4 catheters were used to perform left heart catheterization, coronary and left ventricular angiography. Catheters were exchanged over a long J-tipped guide wire and all angiograms were performed in standard views and as per standard protocol.  Following the procedure, all catheters were withdrawn from the patient, and the patient was placed on a stretcher and taken to the cardiac care area for the usual post-procedural observation and treatment as per protocol.    LVGram:Performed single plane in [place]    EF:  40%     Hemodynamics: LVEDP 8  No gradient on catheter pullback from LV-Ao    Equipment: 5-JL4 and 5-JR4      Selective coronary angiography:    Dominance: Right  LM: Large caliber. 25%.

## 2024-12-20 NOTE — DISCHARGE INSTRUCTIONS
experience any of the following symptoms:  Weight gain of 3 pounds or more overnight or 5 pounds in a week, increased swelling in our hands or feet or shortness of breath while lying flat in bed.  Please call your doctor as soon as you notice any of these symptoms; do not wait until your next office visit.        The discharge information has been reviewed with the patient.  The patient verbalized understanding.  Discharge medications reviewed with the patient and appropriate educational materials and side effects teaching were provided.  ___________________________________________________________________________________________________________________________________     Coronary Angiogram: What to Expect at Home  Your Recovery     A coronary angiogram is a test to examine the large blood vessels of your heart (coronary arteries). The doctor inserted a thin, flexible tube (catheter) into a blood vessel in your groin or wrist.  Your groin or wrist may have a bruise and feel sore for a few days after the procedure. You can do light activities around the house. But do not do anything strenuous until your doctor says it is okay. This may be for several days.  This care sheet gives you a general idea about how long it will take for you to recover. But each person recovers at a different pace. Follow the steps below to feel better as quickly as possible.  How can you care for yourself at home?  Activity    If the doctor gave you a sedative:  For 24 hours, don't do anything that requires attention to detail, such as going to work, making important decisions, or signing any legal documents. It takes time for the medicine's effects to completely wear off.  For your safety, do not drive or operate any machinery that could be dangerous. Wait until the medicine wears off and you can think clearly and react easily.     Do not do strenuous exercise and do not lift, pull, or push anything heavy until your doctor says it is okay.  at a later time if you forget your password.   Enter your e-mail address. You will receive e-mail notification when new information is available in [facility].  Click Sign Up. You can now view your medical record.     Additional Information  If you have questions, please contact your physician practice where you receive care. Remember, [facility] is NOT to be used for urgent needs. For medical emergencies, dial 911.   Patient armband removed and shredded

## 2024-12-20 NOTE — PROGRESS NOTES
Right FAS aspirated and pulled @ 094, by JT. Sterile hemostatic dressing applied. Pressure held for 20 mi. No bleeding or swelling. Safety instructions reviewed with the patient.

## 2024-12-20 NOTE — H&P
[facility], [facility]  [address]  [place] [address]  Phone:[phone]  Fax: [phone]      History and Physical:       Impressions:   Angina Pectoris  Coronary Artery Disease s/p previous catheterization 2016 showed proximal LAD stenosis.   Abnormal NST indicating inferior wall reversible defect.  Syncope.   Cardiomyopathy 9/24 LVEF 20-25%. (LVEF 46-50% 2/19)  Hypertension  Dyslipidemia  Plan:   LHC/Possible PCI. The patient understands and accepts associated risks include and not limited to infection, stroke, heart attack, renal failure, death or emergent surgery.     Chief Complaint: Admitted for chest pain    HPI: Name:     Brian Parker          Age:         79 y.o.           Gender:   male          Ethnicity:      This 79 y.o. patient c/o chest pain described as chest heaviness and dyspnea on exertion. He also c/o palpitations. He reports a syncopal episode standing up and fell over and hit his head. He sought medical attention.        Past  Medical History:   Childhood Illnesses: Negative rheumatic fever, scarlet fever, diphtheria.  Past Medical History:   Diagnosis Date    Abdominal pain     Benign essential HTN 2/17/2016    Dyslipidemia 1/24/2017    Esophageal reflux     Heartburn     High cholesterol     Incontinence     Prostate cancer screening     Swelling of both lower extremities     Urine incontinence      Past Surgical History:   Procedure Laterality Date    CHOLECYSTECTOMY      GI      HERNIA REPAIR Bilateral     TONSILLECTOMY       Family History   Problem Relation Age of Onset    Stroke Neg Hx     Heart Attack Neg Hx      Social History     Socioeconomic History    Marital status: Single   Tobacco Use    Smoking status: Never    Smokeless tobacco: Never   Vaping Use    Vaping status: Never Used   Substance and Sexual Activity    Alcohol use: No    Drug use: No    Sexual activity: Defer     Prior to Admission medications    Medication  Sig Start Date End Date Taking? Authorizing Provider   famotidine (PEPCID) 20 MG tablet Take 1 tablet by mouth 2 times daily 12/7/24  Yes Bayron Francois Jr., DO   bumetanide (BUMEX) 2 MG tablet Take 1 tablet by mouth 2 times daily 10/25/24  Yes Augs Del Angel MD   potassium chloride (KLOR-CON M) 10 MEQ extended release tablet Take 1 tablet by mouth 2 times daily 10/25/24  Yes Agus Del Angel MD   metoprolol succinate (TOPROL XL) 25 MG extended release tablet  8/30/24  Yes Blanco Padilla MD   omeprazole (PRILOSEC) 20 MG delayed release capsule  6/24/24  Yes ProviderBlanco MD   spironolactone (ALDACTONE) 50 MG tablet Take 1 tablet by mouth daily   Yes Blanco Padilla MD   famotidine (PEPCID) 40 MG tablet Take 1 tablet by mouth daily   Yes ProviderBlanco MD   aspirin 81 MG EC tablet Take by mouth daily   Yes Automatic Reconciliation, Ar   lansoprazole (PREVACID) 30 MG delayed release capsule Take by mouth every morning (before breakfast)   Yes Automatic Reconciliation, Ar   sucralfate (CARAFATE) 1 GM tablet Take 1 tablet by mouth 4 times daily 12/7/24   Bayron Francois Jr., DO   rosuvastatin (CRESTOR) 5 MG tablet  6/24/24   ProviderBlanco MD   Cholecalciferol (VITAMIN D3) 1.25 MG (03033 UT) CAPS Take by mouth  Patient not taking: Reported on 12/20/2024    ProviderBlanco MD         Current Medications:      No Known Allergies No shellfish or contrast dye allergy.      Review of Symptoms:   Constitutional: Negative fever, chills.  Head: Occasional headache.   Eyes: Blurred vision, diplopia.  ENT: Negative epistaxis.  Respiratory: Negative hemoptysis.  Cardiovascular: See HPI.  Gastrointestinal: Negative hematemesis, hematochezia.  Genitourinary: Negative hematuria.  Musculoskeletal: Joint pain.  Neurological: Right side weakness.   Hematologic: Negative bleeding.  Skin: Negative rash.      Physical Examination:   /78   Pulse 67   Temp 97.5 °F (36.4 °C) (Oral)   Resp

## 2025-07-21 ENCOUNTER — HOSPITAL ENCOUNTER (OUTPATIENT)
Facility: HOSPITAL | Age: 80
Discharge: HOME | End: 2025-07-24

## 2025-07-21 ENCOUNTER — HOSPITAL ENCOUNTER (OUTPATIENT)
Facility: HOSPITAL | Age: 80
Setting detail: SPECIMEN
Discharge: HOME | End: 2025-07-24

## 2025-07-21 LAB
EKG ATRIAL RATE: 61 BPM
EKG DIAGNOSIS: NORMAL
EKG P AXIS: -8 DEGREES
EKG P-R INTERVAL: 142 MS
EKG Q-T INTERVAL: 404 MS
EKG QRS DURATION: 76 MS
EKG QTC CALCULATION (BAZETT): 406 MS
EKG R AXIS: 21 DEGREES
EKG T AXIS: -38 DEGREES
EKG VENTRICULAR RATE: 61 BPM
LABCORP SPECIMEN COLLECTION: NORMAL

## 2025-07-21 PROCEDURE — 93005 ELECTROCARDIOGRAM TRACING: CPT | Performed by: UROLOGY

## 2025-07-21 PROCEDURE — 99001 SPECIMEN HANDLING PT-LAB: CPT

## 2025-07-21 PROCEDURE — 93010 ELECTROCARDIOGRAM REPORT: CPT | Performed by: INTERNAL MEDICINE

## (undated) DEVICE — GAUZE,SPONGE,4"X4",16PLY,STRL,LF,10/TRAY: Brand: MEDLINE

## (undated) DEVICE — CATHETER GUID 6FR L100CM PTFE XBLAD4 TRUELUMEN HYBRID BRAID

## (undated) DEVICE — PACK PROCEDURE SURG VASC CATH 161 MMC LF

## (undated) DEVICE — MEDI-VAC SUCTION HIGH CAPACITY: Brand: CARDINAL HEALTH

## (undated) DEVICE — RADIFOCUS GLIDEWIRE: Brand: GLIDEWIRE

## (undated) DEVICE — DECANTER BAG 9": Brand: MEDLINE INDUSTRIES, INC.

## (undated) DEVICE — GUIDEWIRE VASC L185CM DIA0.014IN HYDRPHLC PTFE STR TIP

## (undated) DEVICE — CATHETER SUCT TR FL TIP 14FR W/ O CTRL

## (undated) DEVICE — RADIFOCUS OPTITORQUE ANGIOGRAPHIC CATHETER: Brand: OPTITORQUE

## (undated) DEVICE — SYRINGE MED 25GA 3ML L5/8IN SUBQ PLAS W/ DETACH NDL SFTY

## (undated) DEVICE — SYR 50ML SLIP TIP NSAF LF STRL --

## (undated) DEVICE — STERILE POLYISOPRENE POWDER-FREE SURGICAL GLOVES: Brand: PROTEXIS

## (undated) DEVICE — 4FR MICROPUNCTURE KIT STIFFEN

## (undated) DEVICE — GLIDESHEATH SLENDER STAINLESS STEEL KIT: Brand: GLIDESHEATH SLENDER

## (undated) DEVICE — PROCEDURE KIT FLUID MGMT 10 FR CUST MAINFOLD

## (undated) DEVICE — FLUFF AND POLYMER UNDERPAD,EXTRA HEAVY: Brand: WINGS

## (undated) DEVICE — INTRODUCER SHTH 6FR CANN L5.5CM DIL TIP 35MM GRN TUNGSTEN

## (undated) DEVICE — ENDOSCOPY PUMP TUBING/ CAP SET: Brand: ERBE

## (undated) DEVICE — BASIN EMESIS 500CC ROSE 250/CS 60/PLT: Brand: MEDEGEN MEDICAL PRODUCTS, LLC

## (undated) DEVICE — AIRLIFE™ NASAL OXYGEN CANNULA CURVED, FLARED TIP WITH 14 FOOT (4.3 M) CRUSH-RESISTANT TUBING, OVER-THE-EAR STYLE: Brand: AIRLIFE™

## (undated) DEVICE — COVER US PRB W15XL120CM W/ GEL RUBBERBAND TAPE STRP FLD GEN

## (undated) DEVICE — HI-TORQUE VERSACORE FLOPPY GUIDE WIRE SYSTEM 260 CM: Brand: HI-TORQUE VERSACORE

## (undated) DEVICE — SUPPORT WRST COMPR W/ HK MBRACE

## (undated) DEVICE — DRAPE,ANGIO,BRACH,STERILE,38X44: Brand: MEDLINE

## (undated) DEVICE — GUIDEWIRE VASC L260CM DIA0035IN TIP L3MM PTFE J STD TAPR FIX

## (undated) DEVICE — PRESSURE MONITORING SET: Brand: TRUWAVE

## (undated) DEVICE — PADS  DEFIB  ADULT

## (undated) DEVICE — 4FR MICROPUNCTURE KIT

## (undated) DEVICE — ANGIOGRAPHY KIT CUST VASC

## (undated) DEVICE — Device

## (undated) DEVICE — CATHETER 5FR JL4 CORDIS 100 CM

## (undated) DEVICE — PRESTO™ INFLATION DEVICE: Brand: PRESTO

## (undated) DEVICE — SET FLD ADMIN 3 W STPCOCK FIX FEM L BOR 1IN

## (undated) DEVICE — COPILOT BLEEDBACK CONTROL VALVE: Brand: COPILOT

## (undated) DEVICE — BITE BLOCK ENDOSCP UNIV AD 6 TO 9.4 MM

## (undated) DEVICE — MEDI-VAC NON-CONDUCTIVE SUCTION TUBING: Brand: CARDINAL HEALTH

## (undated) DEVICE — SOLUTION IRRIG 1000ML H2O STRL BLT

## (undated) DEVICE — FLEX ADVANTAGE 3000CC: Brand: FLEX ADVANTAGE

## (undated) DEVICE — FCPS RAD JAW 4LC 240CM W/NDL -- BX/20 RADIAL JAW 4

## (undated) DEVICE — TOWEL,OR,DSP,ST,BLUE,STD,4/PK,20PK/CS: Brand: MEDLINE

## (undated) DEVICE — TR BAND RADIAL ARTERY COMPRESSION DEVICE: Brand: TR BAND